# Patient Record
Sex: MALE | Race: WHITE | Employment: OTHER | URBAN - NONMETROPOLITAN AREA
[De-identification: names, ages, dates, MRNs, and addresses within clinical notes are randomized per-mention and may not be internally consistent; named-entity substitution may affect disease eponyms.]

---

## 2018-06-06 ENCOUNTER — HOSPITAL ENCOUNTER (OUTPATIENT)
Dept: ULTRASOUND IMAGING | Age: 63
Discharge: HOME | End: 2018-06-06
Payer: MEDICARE

## 2018-06-06 DIAGNOSIS — N18.4 CHRONIC KIDNEY DISEASE, STAGE IV (SEVERE) (HCC): ICD-10-CM

## 2018-06-06 PROCEDURE — 76770 US EXAM ABDO BACK WALL COMP: CPT

## 2018-06-27 ENCOUNTER — HOSPITAL ENCOUNTER (OUTPATIENT)
Dept: GENERAL RADIOLOGY | Age: 63
Discharge: HOME | End: 2018-06-27
Payer: MEDICARE

## 2018-06-27 ENCOUNTER — HOSPITAL ENCOUNTER (OUTPATIENT)
Dept: PREADMISSION TESTING | Age: 63
Discharge: HOME | End: 2018-07-01
Payer: MEDICARE

## 2018-06-27 ENCOUNTER — HOSPITAL ENCOUNTER (OUTPATIENT)
Dept: VASCULAR LAB | Age: 63
Discharge: HOME | End: 2018-06-27
Payer: MEDICARE

## 2018-06-27 ENCOUNTER — TELEPHONE (OUTPATIENT)
Dept: VASCULAR SURGERY | Age: 63
End: 2018-06-27

## 2018-06-27 ENCOUNTER — OFFICE VISIT (OUTPATIENT)
Dept: VASCULAR SURGERY | Age: 63
End: 2018-06-27
Payer: MEDICARE

## 2018-06-27 VITALS — WEIGHT: 196 LBS | BODY MASS INDEX: 27.44 KG/M2 | HEIGHT: 71 IN

## 2018-06-27 VITALS
HEART RATE: 84 BPM | SYSTOLIC BLOOD PRESSURE: 148 MMHG | DIASTOLIC BLOOD PRESSURE: 70 MMHG | OXYGEN SATURATION: 94 % | RESPIRATION RATE: 18 BRPM

## 2018-06-27 DIAGNOSIS — Z01.818 PRE-OP EXAM: Primary | ICD-10-CM

## 2018-06-27 DIAGNOSIS — N18.4 CHRONIC KIDNEY DISEASE (CKD), STAGE IV (SEVERE) (HCC): Primary | ICD-10-CM

## 2018-06-27 LAB
ANION GAP SERPL CALCULATED.3IONS-SCNC: 17 MMOL/L (ref 7–19)
APTT: 30.8 SEC (ref 26–36.2)
BASOPHILS ABSOLUTE: 0.1 K/UL (ref 0–0.2)
BASOPHILS RELATIVE PERCENT: 0.7 % (ref 0–1)
BUN BLDV-MCNC: 64 MG/DL (ref 8–23)
CALCIUM SERPL-MCNC: 10.2 MG/DL (ref 8.8–10.2)
CHLORIDE BLD-SCNC: 105 MMOL/L (ref 98–111)
CO2: 23 MMOL/L (ref 22–29)
CREAT SERPL-MCNC: 3.8 MG/DL (ref 0.5–1.2)
EOSINOPHILS ABSOLUTE: 0.4 K/UL (ref 0–0.6)
EOSINOPHILS RELATIVE PERCENT: 4.5 % (ref 0–5)
GFR NON-AFRICAN AMERICAN: 16
GLUCOSE BLD-MCNC: 226 MG/DL (ref 74–109)
HCT VFR BLD CALC: 35.9 % (ref 42–52)
HEMOGLOBIN: 11.2 G/DL (ref 14–18)
INR BLD: 1 (ref 0.88–1.18)
LYMPHOCYTES ABSOLUTE: 2.3 K/UL (ref 1.1–4.5)
LYMPHOCYTES RELATIVE PERCENT: 27.7 % (ref 20–40)
MCH RBC QN AUTO: 27.5 PG (ref 27–31)
MCHC RBC AUTO-ENTMCNC: 31.2 G/DL (ref 33–37)
MCV RBC AUTO: 88.2 FL (ref 80–94)
MONOCYTES ABSOLUTE: 0.9 K/UL (ref 0–0.9)
MONOCYTES RELATIVE PERCENT: 11.4 % (ref 0–10)
NEUTROPHILS ABSOLUTE: 4.6 K/UL (ref 1.5–7.5)
NEUTROPHILS RELATIVE PERCENT: 55.5 % (ref 50–65)
PDW BLD-RTO: 14.1 % (ref 11.5–14.5)
PLATELET # BLD: 264 K/UL (ref 130–400)
PMV BLD AUTO: 10.8 FL (ref 9.4–12.4)
POTASSIUM SERPL-SCNC: 5.5 MMOL/L (ref 3.5–5)
PROTHROMBIN TIME: 13.1 SEC (ref 12–14.6)
RBC # BLD: 4.07 M/UL (ref 4.7–6.1)
SODIUM BLD-SCNC: 145 MMOL/L (ref 136–145)
WBC # BLD: 8.3 K/UL (ref 4.8–10.8)

## 2018-06-27 PROCEDURE — 85610 PROTHROMBIN TIME: CPT

## 2018-06-27 PROCEDURE — 87070 CULTURE OTHR SPECIMN AEROBIC: CPT

## 2018-06-27 PROCEDURE — G8427 DOCREV CUR MEDS BY ELIG CLIN: HCPCS | Performed by: PHYSICIAN ASSISTANT

## 2018-06-27 PROCEDURE — 80048 BASIC METABOLIC PNL TOTAL CA: CPT

## 2018-06-27 PROCEDURE — 93005 ELECTROCARDIOGRAM TRACING: CPT

## 2018-06-27 PROCEDURE — 3017F COLORECTAL CA SCREEN DOC REV: CPT | Performed by: PHYSICIAN ASSISTANT

## 2018-06-27 PROCEDURE — 71046 X-RAY EXAM CHEST 2 VIEWS: CPT

## 2018-06-27 PROCEDURE — 85730 THROMBOPLASTIN TIME PARTIAL: CPT

## 2018-06-27 PROCEDURE — 99203 OFFICE O/P NEW LOW 30 MIN: CPT | Performed by: PHYSICIAN ASSISTANT

## 2018-06-27 PROCEDURE — G8419 CALC BMI OUT NRM PARAM NOF/U: HCPCS | Performed by: PHYSICIAN ASSISTANT

## 2018-06-27 PROCEDURE — G0365 VESSEL MAPPING HEMO ACCESS: HCPCS

## 2018-06-27 PROCEDURE — 85025 COMPLETE CBC W/AUTO DIFF WBC: CPT

## 2018-06-27 PROCEDURE — 1036F TOBACCO NON-USER: CPT | Performed by: PHYSICIAN ASSISTANT

## 2018-06-27 RX ORDER — INDAPAMIDE 2.5 MG/1
1 TABLET, FILM COATED ORAL DAILY
COMMUNITY
Start: 2018-05-31 | End: 2021-10-28

## 2018-06-27 RX ORDER — FENOFIBRATE 160 MG/1
160 TABLET ORAL NIGHTLY
Status: ON HOLD | COMMUNITY
Start: 2018-05-06 | End: 2022-09-20 | Stop reason: HOSPADM

## 2018-06-27 RX ORDER — SIMVASTATIN 80 MG
80 TABLET ORAL DAILY
COMMUNITY
Start: 2018-05-07 | End: 2021-10-28

## 2018-06-27 RX ORDER — METOCLOPRAMIDE 5 MG/1
5 TABLET ORAL 4 TIMES DAILY
COMMUNITY
Start: 2018-05-29

## 2018-06-27 RX ORDER — TEMAZEPAM 15 MG/1
15 CAPSULE ORAL NIGHTLY
COMMUNITY
Start: 2018-06-04 | End: 2021-10-28

## 2018-06-27 RX ORDER — SODIUM BICARBONATE 325 MG/1
325 TABLET ORAL 3 TIMES DAILY
COMMUNITY
End: 2018-12-13

## 2018-06-27 RX ORDER — LOSARTAN POTASSIUM 100 MG/1
25 TABLET ORAL DAILY
COMMUNITY
Start: 2018-05-06 | End: 2020-01-23

## 2018-06-27 RX ORDER — INSULIN ASPART 100 [IU]/ML
42 INJECTION, SUSPENSION SUBCUTANEOUS 2 TIMES DAILY
COMMUNITY
Start: 2018-04-02 | End: 2020-01-23

## 2018-06-28 LAB
EKG P AXIS: 34 DEGREES
EKG P-R INTERVAL: 210 MS
EKG Q-T INTERVAL: 420 MS
EKG QRS DURATION: 150 MS
EKG QTC CALCULATION (BAZETT): 434 MS
EKG T AXIS: 47 DEGREES

## 2018-06-29 ENCOUNTER — PREP FOR PROCEDURE (OUTPATIENT)
Dept: VASCULAR SURGERY | Age: 63
End: 2018-06-29

## 2018-06-29 PROBLEM — N18.4 CHRONIC KIDNEY DISEASE (CKD), STAGE IV (SEVERE) (HCC): Status: ACTIVE | Noted: 2018-06-29

## 2018-06-29 LAB — MRSA CULTURE ONLY: NORMAL

## 2018-06-29 RX ORDER — SODIUM CHLORIDE 0.9 % (FLUSH) 0.9 %
10 SYRINGE (ML) INJECTION PRN
Status: CANCELLED | OUTPATIENT
Start: 2018-06-29 | End: 2019-06-29

## 2018-06-29 RX ORDER — ASPIRIN 81 MG/1
81 TABLET ORAL ONCE
Status: CANCELLED | OUTPATIENT
Start: 2018-06-29 | End: 2018-06-29

## 2018-06-29 RX ORDER — SODIUM CHLORIDE 0.9 % (FLUSH) 0.9 %
10 SYRINGE (ML) INJECTION EVERY 12 HOURS SCHEDULED
Status: CANCELLED | OUTPATIENT
Start: 2018-06-29 | End: 2019-06-29

## 2018-07-02 ENCOUNTER — ANESTHESIA (OUTPATIENT)
Dept: OPERATING ROOM | Age: 63
End: 2018-07-02
Payer: MEDICARE

## 2018-07-02 ENCOUNTER — HOSPITAL ENCOUNTER (OUTPATIENT)
Age: 63
Setting detail: OUTPATIENT SURGERY
Discharge: HOME | End: 2018-07-02
Attending: SURGERY | Admitting: SURGERY
Payer: MEDICARE

## 2018-07-02 ENCOUNTER — ANESTHESIA EVENT (OUTPATIENT)
Dept: OPERATING ROOM | Age: 63
End: 2018-07-02
Payer: MEDICARE

## 2018-07-02 VITALS — DIASTOLIC BLOOD PRESSURE: 70 MMHG | SYSTOLIC BLOOD PRESSURE: 110 MMHG | OXYGEN SATURATION: 99 %

## 2018-07-02 VITALS
BODY MASS INDEX: 27.44 KG/M2 | HEIGHT: 71 IN | SYSTOLIC BLOOD PRESSURE: 129 MMHG | HEART RATE: 58 BPM | OXYGEN SATURATION: 97 % | TEMPERATURE: 97.2 F | WEIGHT: 196 LBS | DIASTOLIC BLOOD PRESSURE: 65 MMHG | RESPIRATION RATE: 16 BRPM

## 2018-07-02 LAB
BASE EXCESS VENOUS: 1
CALCIUM IONIZED: 1.27 MMOL/L (ref 1.1–1.3)
CO2: 28 MEQ/L (ref 21–32)
GFR NON-AFRICAN AMERICAN: 15
GLUCOSE BLD-MCNC: 137 MG/DL (ref 70–99)
GLUCOSE BLD-MCNC: 141 MG/DL (ref 70–99)
HEMOGLOBIN: 11.8 GM/DL (ref 12–18)
O2 SAT, VEN: 48 %
PCO2, VEN: 50.5 MM HG (ref 40–50)
PERFORMED ON: ABNORMAL
PERFORMED ON: ABNORMAL
PH VENOUS: 7.35
PO2, VEN: 27.8 MM HG
POC ANION GAP: 10
POC CHLORIDE: 107 MEQ/L (ref 99–110)
POC CREATININE: 4 MG/DL (ref 0.3–1.3)
POC HEMATOCRIT: 35 % (ref 37–52)
POC POTASSIUM: 4.7 MEQ/L (ref 3.5–5.1)
POC SAMPLE TYPE: ABNORMAL
POC SODIUM: 145 MEQ/L (ref 136–145)
TCO2 CALC VENOUS: 29 MMOL/L

## 2018-07-02 PROCEDURE — 82374 ASSAY BLOOD CARBON DIOXIDE: CPT

## 2018-07-02 PROCEDURE — 6370000000 HC RX 637 (ALT 250 FOR IP): Performed by: PHYSICIAN ASSISTANT

## 2018-07-02 PROCEDURE — 2580000003 HC RX 258: Performed by: NURSE ANESTHETIST, CERTIFIED REGISTERED

## 2018-07-02 PROCEDURE — 82435 ASSAY OF BLOOD CHLORIDE: CPT

## 2018-07-02 PROCEDURE — 2500000003 HC RX 250 WO HCPCS: Performed by: NURSE ANESTHETIST, CERTIFIED REGISTERED

## 2018-07-02 PROCEDURE — 82810 BLOOD GASES O2 SAT ONLY: CPT

## 2018-07-02 PROCEDURE — 82948 REAGENT STRIP/BLOOD GLUCOSE: CPT

## 2018-07-02 PROCEDURE — 84295 ASSAY OF SERUM SODIUM: CPT

## 2018-07-02 PROCEDURE — 6360000002 HC RX W HCPCS: Performed by: SURGERY

## 2018-07-02 PROCEDURE — 3600000004 HC SURGERY LEVEL 4 BASE: Performed by: SURGERY

## 2018-07-02 PROCEDURE — 84132 ASSAY OF SERUM POTASSIUM: CPT

## 2018-07-02 PROCEDURE — 6360000002 HC RX W HCPCS: Performed by: ANESTHESIOLOGY

## 2018-07-02 PROCEDURE — 82800 BLOOD PH: CPT

## 2018-07-02 PROCEDURE — 2580000003 HC RX 258: Performed by: SURGERY

## 2018-07-02 PROCEDURE — 7100000000 HC PACU RECOVERY - FIRST 15 MIN: Performed by: SURGERY

## 2018-07-02 PROCEDURE — 82565 ASSAY OF CREATININE: CPT

## 2018-07-02 PROCEDURE — 7100000001 HC PACU RECOVERY - ADDTL 15 MIN: Performed by: SURGERY

## 2018-07-02 PROCEDURE — 3700000001 HC ADD 15 MINUTES (ANESTHESIA): Performed by: SURGERY

## 2018-07-02 PROCEDURE — 3600000014 HC SURGERY LEVEL 4 ADDTL 15MIN: Performed by: SURGERY

## 2018-07-02 PROCEDURE — 7100000011 HC PHASE II RECOVERY - ADDTL 15 MIN: Performed by: SURGERY

## 2018-07-02 PROCEDURE — 7100000010 HC PHASE II RECOVERY - FIRST 15 MIN: Performed by: SURGERY

## 2018-07-02 PROCEDURE — 85014 HEMATOCRIT: CPT

## 2018-07-02 PROCEDURE — 6360000002 HC RX W HCPCS

## 2018-07-02 PROCEDURE — 82330 ASSAY OF CALCIUM: CPT

## 2018-07-02 PROCEDURE — 36821 AV FUSION DIRECT ANY SITE: CPT | Performed by: SURGERY

## 2018-07-02 PROCEDURE — 6360000002 HC RX W HCPCS: Performed by: NURSE ANESTHETIST, CERTIFIED REGISTERED

## 2018-07-02 PROCEDURE — 3700000000 HC ANESTHESIA ATTENDED CARE: Performed by: SURGERY

## 2018-07-02 PROCEDURE — 2780000010 HC IMPLANT OTHER: Performed by: SURGERY

## 2018-07-02 RX ORDER — DIPHENHYDRAMINE HYDROCHLORIDE 50 MG/ML
12.5 INJECTION INTRAMUSCULAR; INTRAVENOUS
Status: DISCONTINUED | OUTPATIENT
Start: 2018-07-02 | End: 2018-07-02 | Stop reason: HOSPADM

## 2018-07-02 RX ORDER — MIDAZOLAM HYDROCHLORIDE 1 MG/ML
INJECTION INTRAMUSCULAR; INTRAVENOUS
Status: COMPLETED
Start: 2018-07-02 | End: 2018-07-02

## 2018-07-02 RX ORDER — SODIUM CHLORIDE 0.9 % (FLUSH) 0.9 %
10 SYRINGE (ML) INJECTION PRN
Status: DISCONTINUED | OUTPATIENT
Start: 2018-07-02 | End: 2018-07-02 | Stop reason: HOSPADM

## 2018-07-02 RX ORDER — ONDANSETRON 2 MG/ML
INJECTION INTRAMUSCULAR; INTRAVENOUS PRN
Status: DISCONTINUED | OUTPATIENT
Start: 2018-07-02 | End: 2018-07-02 | Stop reason: SDUPTHER

## 2018-07-02 RX ORDER — MEPERIDINE HYDROCHLORIDE 50 MG/ML
12.5 INJECTION INTRAMUSCULAR; INTRAVENOUS; SUBCUTANEOUS EVERY 5 MIN PRN
Status: DISCONTINUED | OUTPATIENT
Start: 2018-07-02 | End: 2018-07-02 | Stop reason: HOSPADM

## 2018-07-02 RX ORDER — SODIUM CHLORIDE, SODIUM LACTATE, POTASSIUM CHLORIDE, CALCIUM CHLORIDE 600; 310; 30; 20 MG/100ML; MG/100ML; MG/100ML; MG/100ML
INJECTION, SOLUTION INTRAVENOUS CONTINUOUS PRN
Status: DISCONTINUED | OUTPATIENT
Start: 2018-07-02 | End: 2018-07-02 | Stop reason: SDUPTHER

## 2018-07-02 RX ORDER — LIDOCAINE HYDROCHLORIDE 10 MG/ML
INJECTION, SOLUTION INFILTRATION; PERINEURAL PRN
Status: DISCONTINUED | OUTPATIENT
Start: 2018-07-02 | End: 2018-07-02 | Stop reason: SDUPTHER

## 2018-07-02 RX ORDER — ASPIRIN 81 MG/1
81 TABLET ORAL ONCE
Status: COMPLETED | OUTPATIENT
Start: 2018-07-02 | End: 2018-07-02

## 2018-07-02 RX ORDER — ACETAMINOPHEN 325 MG/1
650 TABLET ORAL EVERY 4 HOURS PRN
Status: DISCONTINUED | OUTPATIENT
Start: 2018-07-02 | End: 2018-07-02 | Stop reason: HOSPADM

## 2018-07-02 RX ORDER — SODIUM CHLORIDE 450 MG/100ML
INJECTION, SOLUTION INTRAVENOUS CONTINUOUS PRN
Status: DISCONTINUED | OUTPATIENT
Start: 2018-07-02 | End: 2018-07-02 | Stop reason: SDUPTHER

## 2018-07-02 RX ORDER — FENTANYL CITRATE 50 UG/ML
50 INJECTION, SOLUTION INTRAMUSCULAR; INTRAVENOUS
Status: DISCONTINUED | OUTPATIENT
Start: 2018-07-02 | End: 2018-07-02 | Stop reason: HOSPADM

## 2018-07-02 RX ORDER — PROPOFOL 10 MG/ML
INJECTION, EMULSION INTRAVENOUS PRN
Status: DISCONTINUED | OUTPATIENT
Start: 2018-07-02 | End: 2018-07-02 | Stop reason: SDUPTHER

## 2018-07-02 RX ORDER — MORPHINE SULFATE 1 MG/ML
4 INJECTION, SOLUTION EPIDURAL; INTRATHECAL; INTRAVENOUS EVERY 5 MIN PRN
Status: DISCONTINUED | OUTPATIENT
Start: 2018-07-02 | End: 2018-07-02 | Stop reason: HOSPADM

## 2018-07-02 RX ORDER — HYDROMORPHONE HCL IN 0.9% NACL 0.5 MG/ML
0.5 SYRINGE (ML) INTRAVENOUS EVERY 5 MIN PRN
Status: DISCONTINUED | OUTPATIENT
Start: 2018-07-02 | End: 2018-07-02 | Stop reason: HOSPADM

## 2018-07-02 RX ORDER — CEFAZOLIN SODIUM 1 G/50ML
1 INJECTION, SOLUTION INTRAVENOUS ONCE
Status: COMPLETED | OUTPATIENT
Start: 2018-07-02 | End: 2018-07-02

## 2018-07-02 RX ORDER — MIDAZOLAM HYDROCHLORIDE 1 MG/ML
2 INJECTION INTRAMUSCULAR; INTRAVENOUS
Status: DISCONTINUED | OUTPATIENT
Start: 2018-07-02 | End: 2018-07-02 | Stop reason: HOSPADM

## 2018-07-02 RX ORDER — HYDROCODONE BITARTRATE AND ACETAMINOPHEN 5; 325 MG/1; MG/1
1 TABLET ORAL EVERY 4 HOURS PRN
Status: DISCONTINUED | OUTPATIENT
Start: 2018-07-02 | End: 2018-07-02 | Stop reason: HOSPADM

## 2018-07-02 RX ORDER — METOCLOPRAMIDE HYDROCHLORIDE 5 MG/ML
10 INJECTION INTRAMUSCULAR; INTRAVENOUS
Status: DISCONTINUED | OUTPATIENT
Start: 2018-07-02 | End: 2018-07-02 | Stop reason: HOSPADM

## 2018-07-02 RX ORDER — SCOLOPAMINE TRANSDERMAL SYSTEM 1 MG/1
1 PATCH, EXTENDED RELEASE TRANSDERMAL ONCE
Status: DISCONTINUED | OUTPATIENT
Start: 2018-07-02 | End: 2018-07-02 | Stop reason: HOSPADM

## 2018-07-02 RX ORDER — HYDRALAZINE HYDROCHLORIDE 20 MG/ML
5 INJECTION INTRAMUSCULAR; INTRAVENOUS EVERY 10 MIN PRN
Status: DISCONTINUED | OUTPATIENT
Start: 2018-07-02 | End: 2018-07-02 | Stop reason: HOSPADM

## 2018-07-02 RX ORDER — LIDOCAINE HYDROCHLORIDE 10 MG/ML
1 INJECTION, SOLUTION EPIDURAL; INFILTRATION; INTRACAUDAL; PERINEURAL
Status: DISCONTINUED | OUTPATIENT
Start: 2018-07-02 | End: 2018-07-02 | Stop reason: HOSPADM

## 2018-07-02 RX ORDER — PROPOFOL 10 MG/ML
INJECTION, EMULSION INTRAVENOUS CONTINUOUS PRN
Status: DISCONTINUED | OUTPATIENT
Start: 2018-07-02 | End: 2018-07-02 | Stop reason: SDUPTHER

## 2018-07-02 RX ORDER — MORPHINE SULFATE 4 MG/ML
4 INJECTION, SOLUTION INTRAMUSCULAR; INTRAVENOUS
Status: DISCONTINUED | OUTPATIENT
Start: 2018-07-02 | End: 2018-07-02 | Stop reason: HOSPADM

## 2018-07-02 RX ORDER — HYDROMORPHONE HCL IN 0.9% NACL 0.5 MG/ML
0.25 SYRINGE (ML) INTRAVENOUS EVERY 5 MIN PRN
Status: DISCONTINUED | OUTPATIENT
Start: 2018-07-02 | End: 2018-07-02 | Stop reason: HOSPADM

## 2018-07-02 RX ORDER — SODIUM CHLORIDE 0.9 % (FLUSH) 0.9 %
10 SYRINGE (ML) INJECTION EVERY 12 HOURS SCHEDULED
Status: DISCONTINUED | OUTPATIENT
Start: 2018-07-02 | End: 2018-07-02 | Stop reason: HOSPADM

## 2018-07-02 RX ORDER — HYDROCODONE BITARTRATE AND ACETAMINOPHEN 5; 325 MG/1; MG/1
2 TABLET ORAL EVERY 4 HOURS PRN
Status: DISCONTINUED | OUTPATIENT
Start: 2018-07-02 | End: 2018-07-02 | Stop reason: HOSPADM

## 2018-07-02 RX ORDER — PROMETHAZINE HYDROCHLORIDE 25 MG/ML
6.25 INJECTION, SOLUTION INTRAMUSCULAR; INTRAVENOUS
Status: DISCONTINUED | OUTPATIENT
Start: 2018-07-02 | End: 2018-07-02 | Stop reason: HOSPADM

## 2018-07-02 RX ORDER — LABETALOL HYDROCHLORIDE 5 MG/ML
5 INJECTION, SOLUTION INTRAVENOUS EVERY 10 MIN PRN
Status: DISCONTINUED | OUTPATIENT
Start: 2018-07-02 | End: 2018-07-02 | Stop reason: HOSPADM

## 2018-07-02 RX ORDER — MORPHINE SULFATE 1 MG/ML
2 INJECTION, SOLUTION EPIDURAL; INTRATHECAL; INTRAVENOUS EVERY 5 MIN PRN
Status: DISCONTINUED | OUTPATIENT
Start: 2018-07-02 | End: 2018-07-02 | Stop reason: HOSPADM

## 2018-07-02 RX ORDER — SODIUM CHLORIDE, SODIUM LACTATE, POTASSIUM CHLORIDE, CALCIUM CHLORIDE 600; 310; 30; 20 MG/100ML; MG/100ML; MG/100ML; MG/100ML
INJECTION, SOLUTION INTRAVENOUS CONTINUOUS
Status: DISCONTINUED | OUTPATIENT
Start: 2018-07-02 | End: 2018-07-02 | Stop reason: HOSPADM

## 2018-07-02 RX ADMIN — SODIUM CHLORIDE: 4.5 INJECTION, SOLUTION INTRAVENOUS at 08:33

## 2018-07-02 RX ADMIN — ONDANSETRON HYDROCHLORIDE 4 MG: 2 SOLUTION INTRAMUSCULAR; INTRAVENOUS at 09:42

## 2018-07-02 RX ADMIN — PROPOFOL 50 MG: 10 INJECTION, EMULSION INTRAVENOUS at 08:38

## 2018-07-02 RX ADMIN — PROPOFOL 75 MCG/KG/MIN: 10 INJECTION, EMULSION INTRAVENOUS at 08:42

## 2018-07-02 RX ADMIN — MIDAZOLAM 2 MG: 1 INJECTION INTRAMUSCULAR; INTRAVENOUS at 08:26

## 2018-07-02 RX ADMIN — CEFAZOLIN SODIUM 1 G: 1 INJECTION, SOLUTION INTRAVENOUS at 08:46

## 2018-07-02 RX ADMIN — LIDOCAINE HYDROCHLORIDE 30 MG: 10 INJECTION, SOLUTION INFILTRATION; PERINEURAL at 08:38

## 2018-07-02 RX ADMIN — ASPIRIN 81 MG: 81 TABLET, COATED ORAL at 07:15

## 2018-07-02 ASSESSMENT — PAIN - FUNCTIONAL ASSESSMENT: PAIN_FUNCTIONAL_ASSESSMENT: 0-10

## 2018-07-02 ASSESSMENT — LIFESTYLE VARIABLES: SMOKING_STATUS: 0

## 2018-07-02 ASSESSMENT — PAIN SCALES - GENERAL: PAINLEVEL_OUTOF10: 0

## 2018-07-02 ASSESSMENT — ENCOUNTER SYMPTOMS: SHORTNESS OF BREATH: 0

## 2018-07-02 NOTE — ADDENDUM NOTE
Addendum  created 07/02/18 1105 by CALIXTO Trevizo - CRNA    Anesthesia Intra Meds edited, Orders acknowledged in Narrator

## 2018-07-02 NOTE — OP NOTE
a  good thrill palpable. One hemostatic suture was required in the  anastomosis. Wound was irrigated and then closed in layers with 3-0 PDS  and 4-0 Monocryl. The patient to recovery room in stable condition.         Mariana Reyna MD    D: 07/02/2018 10:50:16      T: 07/02/2018 11:40:43     [email or web address]  Job#: [id number]     Doc#: [id number]    CC:  MD Du Puentes MD  [facility] Nephrologist

## 2018-07-02 NOTE — ANESTHESIA PRE PROCEDURE
Department of Anesthesiology  Preprocedure Note       Name:  Latonya Rangel   Age:  58 y.o.  :  1955                                          MRN:  [id number]         Date:  2018      Surgeon: Dennis Jones):  Mega Davis MD    Procedure: Procedure(s):  ARM RADICAL/ CEPHALIC AV FISTULA    Medications prior to admission:   Prior to Admission medications    Medication Sig Start Date End Date Taking? Authorizing Provider   fenofibrate 160 MG tablet Take 160 mg by mouth daily 18  Yes Historical Provider, MD   indapamide (LOZOL) 2.5 MG tablet Take 1 tablet by mouth daily 18  Yes Historical Provider, MD   NOVOLOG MIX 70/30 FLEXPEN (70-30) 100 UNIT/ML injection Inject 35 Units as directed 2 times daily 18  Yes Historical Provider, MD   losartan (COZAAR) 100 MG tablet Take 100 mg by mouth daily 18  Yes Historical Provider, MD   metoclopramide (REGLAN) 5 MG tablet Take 5 mg by mouth 4 times daily 18  Yes Historical Provider, MD   simvastatin (ZOCOR) 80 MG tablet Take 80 mg by mouth daily 18  Yes Historical Provider, MD   temazepam (RESTORIL) 15 MG capsule Take 15 mg by mouth nightly. . 18  Yes Historical Provider, MD   aspirin 81 MG tablet Take 81 mg by mouth daily   Yes Historical Provider, MD   sodium bicarbonate 325 MG tablet Take 325 mg by mouth 3 times daily   Yes Historical Provider, MD       Current medications:    Current Facility-Administered Medications   Medication Dose Route Frequency Provider Last Rate Last Dose    sodium chloride flush 0.9 % injection 10 mL  10 mL Intravenous 2 times per day Naun Gomez PA-C        sodium chloride flush 0.9 % injection 10 mL  10 mL Intravenous PRN Chelsie Gomez PA-C        aspirin EC tablet 81 mg  [facility]        ceFAZolin (ANCEF) 1 g in dextrose 5 % 50 mL IVPB (premix)  1 g Intravenous Once Mega Davis MD           Allergies:  No Known Allergies    Problem List:    Patient Active Problem List Diagnosis Code    Chronic kidney disease (CKD), stage IV (severe) ([facility] N18.4       Past Medical History:        Diagnosis Date    Chronic kidney disease     Diabetes mellitus ([facility]     Digestive disorder     \" slow digestion \"    Hyperlipemia     Hypertension     Legally blind     Neuropathy ([facility]     feet       Past Surgical History:        Procedure Laterality Date    CATARACT REMOVAL      EYE SURGERY         Social History:    Social History   Substance Use Topics    Smoking status: Never Smoker    Smokeless tobacco: Never Used    Alcohol use No                                Counseling given: Not Answered      Vital Signs (Current):   Vitals:    07/02/18 0651   BP: (!) 161/82   Pulse: 66   Resp: 20   Temp: 97.5 °F (36.4 °C)   TempSrc: Temporal   Weight: 196 lb (88.9 kg)   Height: 5' 11\" (1.803 m)                                              BP Readings from Last 3 Encounters:   07/02/18 (!) 161/82   06/27/18 (!) 148/70       NPO Status: Time of last liquid consumption: 1600                        Time of last solid consumption: 1600                        Date of last liquid consumption: 07/01/18                        Date of last solid food consumption: 07/01/18    BMI:   Wt Readings from Last 3 Encounters:   07/02/18 196 lb (88.9 kg)   06/27/18 196 lb (88.9 kg)     Body mass index is 27.34 kg/m².     CBC:   Lab Results   Component Value Date    WBC 8.3 06/27/2018    RBC 4.07 06/27/2018    HGB 11.2 06/27/2018    HCT 35.9 06/27/2018    MCV 88.2 06/27/2018    RDW 14.1 06/27/2018     06/27/2018       CMP:   Lab Results   Component Value Date     06/27/2018    K 5.5 06/27/2018     06/27/2018    CO2 23 06/27/2018    BUN 64 06/27/2018    CREATININE 3.8 06/27/2018    LABGLOM 16 06/27/2018    GLUCOSE 226 06/27/2018    CALCIUM 10.2 06/27/2018       POC Tests:   Recent Labs      07/02/18   0700   POCGLU  137*       Coags:   Lab Results   Component Value Date    PROTIME 13.1 06/27/2018

## 2018-07-02 NOTE — H&P (VIEW-ONLY)
Patient Care Team:  Bryon Rosa as PCP - Ramiro Quiroz MD as Consulting Physician (Nephrology)      History and Physical    Mr. Meaghan Santillan is a 57 yo male who has a history of chronic kidney disease for a duration of 1 - 5 years. He has a history of DM and HTN. This is believed to be caused by diabetic nephropathy. He has experienced fatigue. He is now stage IV and disease process has progressed to the point of needing hemodiaylsis access. He is right handed. He has had no previous access. Lab Results   Component Value Date    CREATININE 3.8 (H) 06/27/2018    BUN 64 (H) 06/27/2018     06/27/2018    K 5.5 (H) 06/27/2018     06/27/2018    CO2 23 06/27/2018       Buck Mcmullen is a 58 y.o. male with the following history reviewed and recorded in [facility]:  Patient Active Problem List    Diagnosis Date Noted    Chronic kidney disease (CKD), stage IV (severe) ([facility]) 06/29/2018     Current Outpatient Prescriptions   Medication Sig Dispense Refill    fenofibrate 160 MG tablet Take 160 mg by mouth daily      indapamide (LOZOL) 2.5 MG tablet Take 1 tablet by mouth daily      NOVOLOG MIX 70/30 FLEXPEN (70-30) 100 UNIT/ML injection Inject 35 Units as directed 2 times daily      losartan (COZAAR) 100 MG tablet Take 100 mg by mouth daily      metoclopramide (REGLAN) 5 MG tablet Take 5 mg by mouth 4 times daily      simvastatin (ZOCOR) 80 MG tablet Take 80 mg by mouth daily      temazepam (RESTORIL) 15 MG capsule Take 15 mg by mouth nightly. [facility] aspirin 81 MG tablet Take 81 mg by mouth daily      sodium bicarbonate 325 MG tablet Take 325 mg by mouth 3 times daily       No current facility-administered medications for this visit. Allergies: Patient has no known allergies.   Past Medical History:   Diagnosis Date    Chronic kidney disease     Diabetes mellitus ([address]     Digestive disorder     \" slow digestion \"    Hyperlipemia     Hypertension     Legally blind     Neuropathy ([address]     feet     Past Surgical History:   Procedure Laterality Date    CATARACT REMOVAL      EYE SURGERY       Family History   Problem Relation Age of Onset   Kinjal Robin Cancer Mother     Other Mother         COPD    Other Father         tractor accident     Social History   Substance Use Topics    Smoking status: Never Smoker    Smokeless tobacco: Never Used    Alcohol use No       Old records have been obtained from the referring provider. These records have been reviewed and summarized. Review of Systems    Constitutional  no significant activity change, appetite change, or unexpected weight change. No fever or chills. No diaphoresis or significant fatigue. HENT  no significant rhinorrhea or epistaxis. No tinnitus or significant hearing loss. Eyes  no sudden vision change or amaurosis. Respiratory  no significant shortness of breath, wheezing, or stridor. No apnea, cough, or chest tightness associated with shortness of breath. Cardiovascular  no chest pain, syncope, or significant dizziness. No palpitations or significant leg swelling. No claudication. Gastrointestinal  no abdominal swelling or pain. No blood in stool. No severe constipation, diarrhea, nausea, or vomiting. Genitourinary  No dysuria, frequency, or urgency. No flank pain or hematuria. Musculoskeletal  no back pain, gait disturbance, or myalgia. Skin  no color change, rash, pallor, or new wound. Neurologic  no dizziness, facial asymmetry, or light headedness. No seizures. No speech difficulty or lateralizing weakness. Hematologic  no easy bruising or excessive bleeding. Psychiatric  no severe anxiety or nervousness. No confusion. All other review of systems are negative. Physical Exam    BP (!) 148/70 (Site: Left Arm, Position: Sitting, Cuff Size: Medium Adult)   Pulse 84   Resp 18   SpO2 94%     Constitutional  well developed, well nourished. No diaphoresis or acute distress.   HENT  head

## 2018-07-02 NOTE — BRIEF OP NOTE
Brief Postoperative Note  ______________________________________________________________    Patient: Socrates Díaz  YOB: 1955  MRN: [id number]  Date of Procedure: 7/2/2018    Pre-Op Diagnosis: CKD IV    Post-Op Diagnosis: Same       Procedure(s):  ARM RADICAL/ CEPHALIC AV FISTULA    Anesthesia: Regional, General    Surgeon(s):  Jenelle Cruz MD    Staff:  [employer]: Omid Christy; Jun Chaudhary     Estimated Blood Loss: * No values recorded between 7/2/2018  8:33 AM and 7/2/2018  9:43 AM * None    Complications: None    Specimens:   * No specimens in log *    Implants:  * No implants in log *      Drains:      Findings: see op not    Abimbola Watkins MD  Date: 7/2/2018  Time: 9:43 AM

## 2018-07-18 ENCOUNTER — OFFICE VISIT (OUTPATIENT)
Dept: VASCULAR SURGERY | Age: 63
End: 2018-07-18

## 2018-07-18 VITALS
OXYGEN SATURATION: 94 % | DIASTOLIC BLOOD PRESSURE: 74 MMHG | HEART RATE: 83 BPM | SYSTOLIC BLOOD PRESSURE: 138 MMHG | TEMPERATURE: 97.8 F | RESPIRATION RATE: 18 BRPM

## 2018-07-18 DIAGNOSIS — N18.4 CHRONIC KIDNEY DISEASE (CKD), STAGE IV (SEVERE) (HCC): Primary | ICD-10-CM

## 2018-07-18 PROCEDURE — 99024 POSTOP FOLLOW-UP VISIT: CPT | Performed by: NURSE PRACTITIONER

## 2018-11-05 ENCOUNTER — TELEPHONE (OUTPATIENT)
Dept: VASCULAR SURGERY | Age: 63
End: 2018-11-05

## 2018-12-12 ENCOUNTER — PREP FOR PROCEDURE (OUTPATIENT)
Dept: VASCULAR SURGERY | Age: 63
End: 2018-12-12

## 2018-12-12 RX ORDER — ASPIRIN 81 MG/1
81 TABLET ORAL ONCE
Status: CANCELLED | OUTPATIENT
Start: 2018-12-12 | End: 2018-12-12

## 2018-12-12 RX ORDER — SODIUM CHLORIDE 0.9 % (FLUSH) 0.9 %
10 SYRINGE (ML) INJECTION PRN
Status: CANCELLED | OUTPATIENT
Start: 2018-12-12

## 2018-12-12 RX ORDER — CLONIDINE HYDROCHLORIDE 0.1 MG/1
0.1 TABLET ORAL PRN
Status: CANCELLED | OUTPATIENT
Start: 2018-12-12

## 2018-12-12 RX ORDER — SODIUM CHLORIDE 9 MG/ML
INJECTION, SOLUTION INTRAVENOUS CONTINUOUS
Status: CANCELLED | OUTPATIENT
Start: 2018-12-12

## 2018-12-12 NOTE — H&P
Patient Care Team:  Ab Quach as PCP - Gonzalo Najera MD as Consulting Physician (Nephrology)        Latonya Calderon 61 y.o. male with a history of renal failure requiring hemodialysis access. Patient is currently having arterial spasms and difficulty cannulating    Patient Active Problem List   Diagnosis    Chronic kidney disease (CKD), stage IV (severe) ([facility]      See attached meds  Allergies:  Patient has no known allergies. Past Medical History:   Diagnosis Date    Chronic kidney disease     Diabetes mellitus ([facility]     Digestive disorder     \" slow digestion \"    Hyperlipemia     Hypertension     Legally blind     Neuropathy     feet      Past Surgical History:   Procedure Laterality Date    CATARACT REMOVAL      EYE SURGERY      [place] ANASTOMOSIS,AV,ANY SITE Left 7/2/2018    ARM RADICAL/ CEPHALIC AV FISTULA performed by Lex Ascencio MD at [address] History   Problem Relation Age of Onset    Cancer Mother     Other Mother         COPD    Other Father         tractor accident      Social History   Substance Use Topics    Smoking status: Never Smoker    Smokeless tobacco: Never Used    Alcohol use No     PE:  NAD  CTA B  RRR  Weak left forearm av fistula    A/P:    Poorly functioning weak left forearm av fistula    Permit for fistulogram with possible angioplasty or stent    Risks have been reviewed with the patient including but not limited to heart attack, death, CVA, bleeding, nerve injury, infection, steal, pain, and need for further surgery.       _______________________________________________________________________  Signature     Date    Time

## 2018-12-13 ENCOUNTER — HOSPITAL ENCOUNTER (OUTPATIENT)
Dept: INTERVENTIONAL RADIOLOGY/VASCULAR | Age: 63
Discharge: HOME | End: 2018-12-13
Payer: MEDICARE

## 2018-12-13 VITALS
TEMPERATURE: 97.3 F | SYSTOLIC BLOOD PRESSURE: 132 MMHG | HEART RATE: 72 BPM | DIASTOLIC BLOOD PRESSURE: 75 MMHG | OXYGEN SATURATION: 97 % | RESPIRATION RATE: 17 BRPM | WEIGHT: 187 LBS | BODY MASS INDEX: 26.18 KG/M2 | HEIGHT: 71 IN

## 2018-12-13 DIAGNOSIS — T82.590A DIALYSIS AV FISTULA MALFUNCTION, INITIAL ENCOUNTER (HCC): ICD-10-CM

## 2018-12-13 DIAGNOSIS — N18.4 CHRONIC KIDNEY DISEASE (CKD), STAGE IV (SEVERE) (HCC): ICD-10-CM

## 2018-12-13 PROCEDURE — 99152 MOD SED SAME PHYS/QHP 5/>YRS: CPT | Performed by: SURGERY

## 2018-12-13 PROCEDURE — 6360000004 HC RX CONTRAST MEDICATION: Performed by: SURGERY

## 2018-12-13 PROCEDURE — 2500000003 HC RX 250 WO HCPCS: Performed by: SURGERY

## 2018-12-13 PROCEDURE — 2709999900 IR GUIDED INTRO CATH DIALYSIS CIRCUIT

## 2018-12-13 PROCEDURE — 6370000000 HC RX 637 (ALT 250 FOR IP): Performed by: NURSE PRACTITIONER

## 2018-12-13 PROCEDURE — 36902 INTRO CATH DIALYSIS CIRCUIT: CPT | Performed by: SURGERY

## 2018-12-13 PROCEDURE — 36909 DIALYSIS CIRCUIT EMBOLJ: CPT | Performed by: SURGERY

## 2018-12-13 PROCEDURE — 6360000002 HC RX W HCPCS: Performed by: SURGERY

## 2018-12-13 PROCEDURE — 2580000003 HC RX 258: Performed by: NURSE PRACTITIONER

## 2018-12-13 PROCEDURE — 99153 MOD SED SAME PHYS/QHP EA: CPT | Performed by: SURGERY

## 2018-12-13 RX ORDER — SODIUM CHLORIDE 0.9 % (FLUSH) 0.9 %
10 SYRINGE (ML) INJECTION PRN
Status: DISCONTINUED | OUTPATIENT
Start: 2018-12-13 | End: 2018-12-15 | Stop reason: HOSPADM

## 2018-12-13 RX ORDER — HYDROCODONE BITARTRATE AND ACETAMINOPHEN 5; 325 MG/1; MG/1
1 TABLET ORAL EVERY 4 HOURS PRN
Status: DISCONTINUED | OUTPATIENT
Start: 2018-12-13 | End: 2018-12-15 | Stop reason: HOSPADM

## 2018-12-13 RX ORDER — HEPARIN SODIUM 5000 [USP'U]/ML
INJECTION, SOLUTION INTRAVENOUS; SUBCUTANEOUS
Status: COMPLETED | OUTPATIENT
Start: 2018-12-13 | End: 2018-12-13

## 2018-12-13 RX ORDER — ASPIRIN 81 MG/1
81 TABLET ORAL ONCE
Status: COMPLETED | OUTPATIENT
Start: 2018-12-13 | End: 2018-12-13

## 2018-12-13 RX ORDER — MIDAZOLAM HYDROCHLORIDE 1 MG/ML
INJECTION INTRAMUSCULAR; INTRAVENOUS
Status: COMPLETED | OUTPATIENT
Start: 2018-12-13 | End: 2018-12-13

## 2018-12-13 RX ORDER — SODIUM CHLORIDE 9 MG/ML
INJECTION, SOLUTION INTRAVENOUS CONTINUOUS
Status: DISCONTINUED | OUTPATIENT
Start: 2018-12-13 | End: 2018-12-15 | Stop reason: HOSPADM

## 2018-12-13 RX ORDER — ONDANSETRON 2 MG/ML
4 INJECTION INTRAMUSCULAR; INTRAVENOUS EVERY 8 HOURS PRN
Status: DISCONTINUED | OUTPATIENT
Start: 2018-12-13 | End: 2018-12-15 | Stop reason: HOSPADM

## 2018-12-13 RX ORDER — FENTANYL CITRATE 50 UG/ML
INJECTION, SOLUTION INTRAMUSCULAR; INTRAVENOUS
Status: COMPLETED | OUTPATIENT
Start: 2018-12-13 | End: 2018-12-13

## 2018-12-13 RX ORDER — HYDROCODONE BITARTRATE AND ACETAMINOPHEN 5; 325 MG/1; MG/1
2 TABLET ORAL EVERY 4 HOURS PRN
Status: DISCONTINUED | OUTPATIENT
Start: 2018-12-13 | End: 2018-12-15 | Stop reason: HOSPADM

## 2018-12-13 RX ORDER — LIDOCAINE HYDROCHLORIDE 20 MG/ML
INJECTION, SOLUTION INFILTRATION; PERINEURAL
Status: COMPLETED | OUTPATIENT
Start: 2018-12-13 | End: 2018-12-13

## 2018-12-13 RX ORDER — ACETAMINOPHEN 325 MG/1
650 TABLET ORAL EVERY 4 HOURS PRN
Status: DISCONTINUED | OUTPATIENT
Start: 2018-12-13 | End: 2018-12-15 | Stop reason: HOSPADM

## 2018-12-13 RX ORDER — CLONIDINE HYDROCHLORIDE 0.1 MG/1
0.1 TABLET ORAL PRN
Status: DISCONTINUED | OUTPATIENT
Start: 2018-12-13 | End: 2018-12-15 | Stop reason: HOSPADM

## 2018-12-13 RX ADMIN — IOPAMIDOL 40 ML: 612 INJECTION, SOLUTION INTRATHECAL at 10:40

## 2018-12-13 RX ADMIN — SODIUM CHLORIDE: 9 INJECTION, SOLUTION INTRAVENOUS at 09:11

## 2018-12-13 RX ADMIN — HEPARIN SODIUM 5000 UNITS: 5000 INJECTION, SOLUTION INTRAVENOUS; SUBCUTANEOUS at 10:16

## 2018-12-13 RX ADMIN — LIDOCAINE HYDROCHLORIDE 10 ML: 20 INJECTION, SOLUTION INFILTRATION; PERINEURAL at 10:16

## 2018-12-13 RX ADMIN — MIDAZOLAM 1 MG: 1 INJECTION INTRAMUSCULAR; INTRAVENOUS at 10:29

## 2018-12-13 RX ADMIN — MIDAZOLAM 1 MG: 1 INJECTION INTRAMUSCULAR; INTRAVENOUS at 10:15

## 2018-12-13 RX ADMIN — FENTANYL CITRATE 25 MCG: 50 INJECTION INTRAMUSCULAR; INTRAVENOUS at 10:29

## 2018-12-13 RX ADMIN — FENTANYL CITRATE 25 MCG: 50 INJECTION INTRAMUSCULAR; INTRAVENOUS at 10:16

## 2018-12-13 RX ADMIN — ASPIRIN 81 MG: 81 TABLET, COATED ORAL at 09:11

## 2019-03-20 ENCOUNTER — TELEPHONE (OUTPATIENT)
Dept: VASCULAR SURGERY | Age: 64
End: 2019-03-20

## 2019-03-27 ENCOUNTER — PREP FOR PROCEDURE (OUTPATIENT)
Dept: VASCULAR SURGERY | Age: 64
End: 2019-03-27

## 2019-03-27 RX ORDER — SODIUM CHLORIDE 9 MG/ML
INJECTION, SOLUTION INTRAVENOUS CONTINUOUS
Status: CANCELLED | OUTPATIENT
Start: 2019-03-27

## 2019-03-27 RX ORDER — SODIUM CHLORIDE 0.9 % (FLUSH) 0.9 %
10 SYRINGE (ML) INJECTION PRN
Status: CANCELLED | OUTPATIENT
Start: 2019-03-27

## 2019-03-27 RX ORDER — ASPIRIN 81 MG/1
81 TABLET ORAL ONCE
Status: CANCELLED | OUTPATIENT
Start: 2019-03-27 | End: 2019-03-27

## 2019-03-27 RX ORDER — CLONIDINE HYDROCHLORIDE 0.1 MG/1
0.1 TABLET ORAL PRN
Status: CANCELLED | OUTPATIENT
Start: 2019-03-27

## 2019-03-28 ENCOUNTER — HOSPITAL ENCOUNTER (OUTPATIENT)
Dept: INTERVENTIONAL RADIOLOGY/VASCULAR | Age: 64
Discharge: HOME | End: 2019-03-28
Payer: MEDICARE

## 2019-03-28 VITALS
HEART RATE: 70 BPM | OXYGEN SATURATION: 97 % | RESPIRATION RATE: 16 BRPM | HEIGHT: 71 IN | WEIGHT: 185 LBS | BODY MASS INDEX: 25.9 KG/M2 | DIASTOLIC BLOOD PRESSURE: 71 MMHG | SYSTOLIC BLOOD PRESSURE: 137 MMHG | TEMPERATURE: 98.1 F

## 2019-03-28 DIAGNOSIS — T82.590A DIALYSIS AV FISTULA MALFUNCTION, INITIAL ENCOUNTER (HCC): ICD-10-CM

## 2019-03-28 PROBLEM — N18.4 CHRONIC KIDNEY DISEASE (CKD), STAGE IV (SEVERE) (HCC): Status: RESOLVED | Noted: 2018-06-29 | Resolved: 2019-03-28

## 2019-03-28 PROCEDURE — 36901 INTRO CATH DIALYSIS CIRCUIT: CPT | Performed by: SURGERY

## 2019-03-28 PROCEDURE — 2709999900 IR GUIDED INTRO CATH DIALYSIS CIRCUIT

## 2019-03-28 PROCEDURE — 99152 MOD SED SAME PHYS/QHP 5/>YRS: CPT | Performed by: SURGERY

## 2019-03-28 PROCEDURE — 2580000003 HC RX 258: Performed by: NURSE PRACTITIONER

## 2019-03-28 PROCEDURE — 2500000003 HC RX 250 WO HCPCS: Performed by: SURGERY

## 2019-03-28 PROCEDURE — 99153 MOD SED SAME PHYS/QHP EA: CPT | Performed by: SURGERY

## 2019-03-28 PROCEDURE — 6360000004 HC RX CONTRAST MEDICATION: Performed by: SURGERY

## 2019-03-28 PROCEDURE — 6370000000 HC RX 637 (ALT 250 FOR IP): Performed by: NURSE PRACTITIONER

## 2019-03-28 PROCEDURE — 6360000002 HC RX W HCPCS: Performed by: SURGERY

## 2019-03-28 RX ORDER — SODIUM CHLORIDE 0.9 % (FLUSH) 0.9 %
10 SYRINGE (ML) INJECTION PRN
Status: DISCONTINUED | OUTPATIENT
Start: 2019-03-28 | End: 2019-03-30 | Stop reason: HOSPADM

## 2019-03-28 RX ORDER — SODIUM CHLORIDE 9 MG/ML
INJECTION, SOLUTION INTRAVENOUS CONTINUOUS
Status: DISCONTINUED | OUTPATIENT
Start: 2019-03-28 | End: 2019-03-30 | Stop reason: HOSPADM

## 2019-03-28 RX ORDER — FENTANYL CITRATE 50 UG/ML
INJECTION, SOLUTION INTRAMUSCULAR; INTRAVENOUS
Status: COMPLETED | OUTPATIENT
Start: 2019-03-28 | End: 2019-03-28

## 2019-03-28 RX ORDER — CLONIDINE HYDROCHLORIDE 0.1 MG/1
0.1 TABLET ORAL PRN
Status: DISCONTINUED | OUTPATIENT
Start: 2019-03-28 | End: 2019-03-30 | Stop reason: HOSPADM

## 2019-03-28 RX ORDER — MIDAZOLAM HYDROCHLORIDE 1 MG/ML
INJECTION INTRAMUSCULAR; INTRAVENOUS
Status: COMPLETED | OUTPATIENT
Start: 2019-03-28 | End: 2019-03-28

## 2019-03-28 RX ORDER — ONDANSETRON 2 MG/ML
4 INJECTION INTRAMUSCULAR; INTRAVENOUS EVERY 8 HOURS PRN
Status: DISCONTINUED | OUTPATIENT
Start: 2019-03-28 | End: 2019-03-30 | Stop reason: HOSPADM

## 2019-03-28 RX ORDER — HYDROCODONE BITARTRATE AND ACETAMINOPHEN 5; 325 MG/1; MG/1
1 TABLET ORAL EVERY 4 HOURS PRN
Status: DISCONTINUED | OUTPATIENT
Start: 2019-03-28 | End: 2019-03-30 | Stop reason: HOSPADM

## 2019-03-28 RX ORDER — HEPARIN SODIUM 5000 [USP'U]/ML
INJECTION, SOLUTION INTRAVENOUS; SUBCUTANEOUS
Status: COMPLETED | OUTPATIENT
Start: 2019-03-28 | End: 2019-03-28

## 2019-03-28 RX ORDER — ASPIRIN 81 MG/1
81 TABLET ORAL ONCE
Status: COMPLETED | OUTPATIENT
Start: 2019-03-28 | End: 2019-03-28

## 2019-03-28 RX ORDER — ACETAMINOPHEN 325 MG/1
650 TABLET ORAL EVERY 4 HOURS PRN
Status: DISCONTINUED | OUTPATIENT
Start: 2019-03-28 | End: 2019-03-30 | Stop reason: HOSPADM

## 2019-03-28 RX ORDER — LIDOCAINE HYDROCHLORIDE 20 MG/ML
INJECTION, SOLUTION INFILTRATION; PERINEURAL
Status: COMPLETED | OUTPATIENT
Start: 2019-03-28 | End: 2019-03-28

## 2019-03-28 RX ORDER — HYDROCODONE BITARTRATE AND ACETAMINOPHEN 5; 325 MG/1; MG/1
2 TABLET ORAL EVERY 4 HOURS PRN
Status: DISCONTINUED | OUTPATIENT
Start: 2019-03-28 | End: 2019-03-30 | Stop reason: HOSPADM

## 2019-03-28 RX ADMIN — HEPARIN SODIUM 5000 UNITS: 5000 INJECTION, SOLUTION INTRAVENOUS; SUBCUTANEOUS at 12:08

## 2019-03-28 RX ADMIN — FENTANYL CITRATE 25 MCG: 50 INJECTION INTRAMUSCULAR; INTRAVENOUS at 12:08

## 2019-03-28 RX ADMIN — IOPAMIDOL 15 ML: 612 INJECTION, SOLUTION INTRATHECAL at 12:13

## 2019-03-28 RX ADMIN — SODIUM CHLORIDE: 9 INJECTION, SOLUTION INTRAVENOUS at 10:13

## 2019-03-28 RX ADMIN — MIDAZOLAM 0.5 MG: 1 INJECTION INTRAMUSCULAR; INTRAVENOUS at 12:07

## 2019-03-28 RX ADMIN — ASPIRIN 81 MG: 81 TABLET ORAL at 10:15

## 2019-03-28 RX ADMIN — LIDOCAINE HYDROCHLORIDE 5 ML: 20 INJECTION, SOLUTION INFILTRATION; PERINEURAL at 12:08

## 2019-06-05 ENCOUNTER — TELEPHONE (OUTPATIENT)
Dept: VASCULAR SURGERY | Age: 64
End: 2019-06-05

## 2019-06-05 NOTE — TELEPHONE ENCOUNTER
Dialysis reports having increased difficulty with cannulation, feeling the access itself is too small and decreased flow rate, I discussed the situation with S[facility], Pt has been provided an office appointment to discuss Creation LUE Upper Arm AV Brachial/Cephalic AVF with P'cath placement. I spoke with Deshaun Petersen at [facility] to notify her of this appt. Phyllis voiced understanding.  OB

## 2019-06-10 ENCOUNTER — TELEPHONE (OUTPATIENT)
Dept: VASCULAR SURGERY | Age: 64
End: 2019-06-10

## 2019-06-10 NOTE — TELEPHONE ENCOUNTER
Joanna Lowry requests that  Nurse return their call. The best time to reach him is Anytime. Patient daughter [facility] would like to discuss what the patient appt for Tues 6-11-19. Thank you.

## 2019-06-10 NOTE — TELEPHONE ENCOUNTER
Apt is to discuss Creation AV Access LUE with "[employer]. Daughter and unit notified.  OB

## 2019-12-19 ENCOUNTER — OFFICE VISIT (OUTPATIENT)
Dept: VASCULAR SURGERY | Age: 64
End: 2019-12-19
Payer: MEDICARE

## 2019-12-19 VITALS
RESPIRATION RATE: 18 BRPM | OXYGEN SATURATION: 96 % | HEART RATE: 84 BPM | DIASTOLIC BLOOD PRESSURE: 68 MMHG | SYSTOLIC BLOOD PRESSURE: 125 MMHG | TEMPERATURE: 98 F

## 2019-12-19 DIAGNOSIS — N18.6 CKD (CHRONIC KIDNEY DISEASE) STAGE V REQUIRING CHRONIC DIALYSIS (HCC): ICD-10-CM

## 2019-12-19 DIAGNOSIS — Z99.2 CKD (CHRONIC KIDNEY DISEASE) STAGE V REQUIRING CHRONIC DIALYSIS (HCC): ICD-10-CM

## 2019-12-19 PROCEDURE — G8419 CALC BMI OUT NRM PARAM NOF/U: HCPCS | Performed by: PHYSICIAN ASSISTANT

## 2019-12-19 PROCEDURE — 1036F TOBACCO NON-USER: CPT | Performed by: PHYSICIAN ASSISTANT

## 2019-12-19 PROCEDURE — G8484 FLU IMMUNIZE NO ADMIN: HCPCS | Performed by: PHYSICIAN ASSISTANT

## 2019-12-19 PROCEDURE — G8427 DOCREV CUR MEDS BY ELIG CLIN: HCPCS | Performed by: PHYSICIAN ASSISTANT

## 2019-12-19 PROCEDURE — 99214 OFFICE O/P EST MOD 30 MIN: CPT | Performed by: PHYSICIAN ASSISTANT

## 2019-12-19 PROCEDURE — 3017F COLORECTAL CA SCREEN DOC REV: CPT | Performed by: PHYSICIAN ASSISTANT

## 2019-12-19 RX ORDER — LANOLIN ALCOHOL/MO/W.PET/CERES
10 CREAM (GRAM) TOPICAL NIGHTLY PRN
COMMUNITY

## 2019-12-19 RX ORDER — ACETAMINOPHEN 500 MG
500 TABLET ORAL EVERY 6 HOURS PRN
COMMUNITY

## 2020-01-03 ENCOUNTER — TELEPHONE (OUTPATIENT)
Dept: VASCULAR SURGERY | Age: 65
End: 2020-01-03

## 2020-01-06 ENCOUNTER — TELEPHONE (OUTPATIENT)
Dept: VASCULAR SURGERY | Age: 65
End: 2020-01-06

## 2020-01-09 ENCOUNTER — TELEPHONE (OUTPATIENT)
Dept: VASCULAR SURGERY | Age: 65
End: 2020-01-09

## 2020-01-09 NOTE — TELEPHONE ENCOUNTER
Spoke with patient in regards to scheduling his surgery with Dr. Migdalia Lai. Patient request that I call details/instructions to his daughter Chantal Kelly. Spoke with Patty Tiffanydimple, details/instructions and medications were addressed (see letter) for surgery at [facility] on 2/4/20 with Dr. Migdalia Lai. He will need to arrive at the [address] [place] [address] at 8:00am. He will need a  to take him home. He will need to register at the [address] [place] [address] on 1/23/20 at 1:30pm for pre-op testing. Patty Franklin verbalizes understanding and written instructions were mailed to the patient. Patty Franklin states that her dad wanted to know if they can balloon his current AV access to last long enough for his new access to mature instead of possible placing Perma-cath. After talking with Annamarie Lewis PA-C informed her and patient that the veins in his current AV access is too small to balloon and that is the reason for possible Perma-cath Placement to have an access available in case he is unable to get adequate dialysis treatment with his current access. Patient has more questions about the Possible Perma-cath and would like to speak with [facility]. Message was given to [facility] and she will call the patient.

## 2020-01-10 ENCOUNTER — TELEPHONE (OUTPATIENT)
Dept: VASCULAR SURGERY | Age: 65
End: 2020-01-10

## 2020-01-10 NOTE — TELEPHONE ENCOUNTER
Left message x 2 for patient to call our office  regarding his upcoming surgery and questions. Awaiting call back.

## 2020-01-23 ENCOUNTER — HOSPITAL ENCOUNTER (OUTPATIENT)
Dept: PREADMISSION TESTING | Age: 65
Discharge: HOME | End: 2020-01-27
Payer: MEDICARE

## 2020-01-23 ENCOUNTER — HOSPITAL ENCOUNTER (OUTPATIENT)
Dept: GENERAL RADIOLOGY | Age: 65
Discharge: HOME | End: 2020-01-23
Payer: MEDICARE

## 2020-01-23 VITALS — BODY MASS INDEX: 25.9 KG/M2 | HEIGHT: 71 IN | WEIGHT: 185 LBS

## 2020-01-23 PROCEDURE — 93005 ELECTROCARDIOGRAM TRACING: CPT

## 2020-01-23 PROCEDURE — 87081 CULTURE SCREEN ONLY: CPT

## 2020-01-23 PROCEDURE — 71046 X-RAY EXAM CHEST 2 VIEWS: CPT

## 2020-01-23 RX ORDER — LOSARTAN POTASSIUM 25 MG/1
25 TABLET ORAL
COMMUNITY
End: 2021-10-28

## 2020-01-24 LAB — MRSA CULTURE ONLY: NORMAL

## 2020-01-26 PROCEDURE — 93010 ELECTROCARDIOGRAM REPORT: CPT | Performed by: INTERNAL MEDICINE

## 2020-01-30 LAB
EKG P AXIS: 45 DEGREES
EKG P-R INTERVAL: 218 MS
EKG Q-T INTERVAL: 400 MS
EKG QRS DURATION: 144 MS
EKG QTC CALCULATION (BAZETT): 457 MS
EKG T AXIS: 20 DEGREES

## 2020-02-03 ENCOUNTER — PREP FOR PROCEDURE (OUTPATIENT)
Dept: VASCULAR SURGERY | Age: 65
End: 2020-02-03

## 2020-02-03 RX ORDER — SODIUM CHLORIDE 0.9 % (FLUSH) 0.9 %
10 SYRINGE (ML) INJECTION PRN
Status: CANCELLED | OUTPATIENT
Start: 2020-02-03

## 2020-02-03 RX ORDER — SODIUM CHLORIDE 0.9 % (FLUSH) 0.9 %
10 SYRINGE (ML) INJECTION EVERY 12 HOURS SCHEDULED
Status: CANCELLED | OUTPATIENT
Start: 2020-02-03

## 2020-02-03 RX ORDER — ASPIRIN 81 MG/1
81 TABLET ORAL ONCE
Status: CANCELLED | OUTPATIENT
Start: 2020-02-03 | End: 2020-02-03

## 2020-02-03 NOTE — H&P
facility-administered medications for this visit. Allergies: Patient has no known allergies. Past Medical History:   Diagnosis Date    Arthritis     Chronic kidney disease     CKD (chronic kidney disease) stage V requiring chronic dialysis ([facility] 12/19/2019    Diabetes mellitus ([facility]     Diabetic retinopathy ([facility]     Digestive disorder     \" slow digestion \"    Hemodialysis patient ([facility]     Hyperlipemia     Hypertension     Legally blind     Neuropathy     feet     Past Surgical History:   Procedure Laterality Date    CATARACT REMOVAL      EYE SURGERY      [place] ANASTOMOSIS,AV,ANY SITE Left 7/2/2018    ARM RADICAL/ CEPHALIC AV FISTULA performed by Ike Ortiz MD at [address]  12/13/2018    SJS. Left upper fistulograms, BA distal forearm cephalic vein [id number] cutter,coil branch (5x3(2)    VASCULAR SURGERY  03/28/2019    S[facility]. Left upper fistulograms/venograms. Family History   Problem Relation Age of Onset   Lev Chard Cancer Mother     Other Mother         COPD    Other Father         tractor accident     Social History     Tobacco Use    Smoking status: Never Smoker    Smokeless tobacco: Never Used   Substance Use Topics    Alcohol use: No       Review of Systems    Constitutional - no significant activity change, appetite change, or unexpected weight change. No fever or chills. No diaphoresis or significant fatigue. HENT - no significant rhinorrhea or epistaxis. No tinnitus or significant hearing loss. Eyes - no sudden vision change or amaurosis. Respiratory - no significant shortness of breath, wheezing, or stridor. No apnea, cough, or chest tightness associated with shortness of breath. Cardiovascular - no chest pain, syncope, or significant dizziness. No palpitations or significant leg swelling. No claudication. Gastrointestinal - no abdominal swelling or pain. No blood in stool. No severe constipation, diarrhea, nausea, or vomiting.   Genitourinary - No dysuria, have been discussed with the patient including continued medical management vs. proceeding with left upper extremity brachiocephalic AV fistula. Risks have been discussed with the patient including but not limited to MI, death, CVA, bleed, nerve injury, steal, and infection. Patient would like to come in and to discuss with Dr. Cody Alvarez. Assessment    1. CKD (chronic kidney disease) stage V requiring chronic dialysis (HCC)      Left forearm (distal radial cephalic) av fistula malfunction    Plan      Options have been discussed with the patient including continued medical management vs. proceeding with left upper extremity brachiocephalic AV fistula. Risks have been discussed with the patient including but not limited to MI, death, CVA, bleed, nerve injury, steal, and infection.         Proceed with Left brachial cephalic AV fistula; possible placement of Permcath

## 2020-02-03 NOTE — H&P (VIEW-ONLY)
Patient Care Team:  Marlon Jordan MD as PCP - General (Family Medicine)  Truman Olivo MD as Consulting Physician (Nephrology)      History and Physical    Mr. Kaylee Atkinson is a 60 yo male with a history of chronic kidney disease on chronic dialysis. He had a left radial-cephalic AV fistula created on 7/2/18 by Dr. Anjelica Dunn. This required BA distal FA cephalic vein on 41/06/89 by Dr. Joseph Tafoya. He underwent a left upper extremity fistulogram on 3/28/2019 with no intervention was done at that time. Since the patient's fistula continued to be difficult to cannulate Dr. Joseph Tafoya recommended considering creating a new left upper extremity brachial cephalic AV fistula. Patient comes in today to discuss possible options. Efe Jiménez is a 59 y.o. male with the following history reviewed and recorded in [facility]:  Patient Active Problem List    Diagnosis Date Noted    CKD (chronic kidney disease) stage V requiring chronic dialysis ([facility] 12/19/2019    Dialysis AV fistula malfunction, initial encounter ([facility] 12/13/2018     Current Outpatient Medications   Medication Sig Dispense Refill    melatonin 3 MG TABS tablet Take 3 mg by mouth nightly as needed       acetaminophen (TYLENOL) 500 MG tablet Take 500 mg by mouth every 6 hours as needed for Pain      fenofibrate 160 MG tablet Take 160 mg by mouth daily      indapamide (LOZOL) 2.5 MG tablet Take 1 tablet by mouth daily      metoclopramide (REGLAN) 5 MG tablet Take 5 mg by mouth 4 times daily      simvastatin (ZOCOR) 80 MG tablet Take 80 mg by mouth daily      temazepam (RESTORIL) 15 MG capsule Take 15 mg by mouth nightly. [facility] aspirin 81 MG tablet Take 81 mg by mouth daily      losartan (COZAAR) 25 MG tablet Take 25 mg by mouth three times a week at night before dialysis      insulin aspart protamine-insulin aspart (NOVOLOG MIX 70/30 FLEXPEN) (70-30) 100 UNIT/ML injection Inject 42 Units into the skin 2 times daily (with meals)       No current facility-administered medications for this visit. Allergies: Patient has no known allergies. Past Medical History:   Diagnosis Date    Arthritis     Chronic kidney disease     CKD (chronic kidney disease) stage V requiring chronic dialysis ([facility] 12/19/2019    Diabetes mellitus ([facility]     Diabetic retinopathy ([facility]     Digestive disorder     \" slow digestion \"    Hemodialysis patient ([facility]     Hyperlipemia     Hypertension     Legally blind     Neuropathy     feet     Past Surgical History:   Procedure Laterality Date    CATARACT REMOVAL      EYE SURGERY      [place] ANASTOMOSIS,AV,ANY SITE Left 7/2/2018    ARM RADICAL/ CEPHALIC AV FISTULA performed by Jerry Arthur MD at [address]  12/13/2018    SJS. Left upper fistulograms, BA distal forearm cephalic vein [id number] cutter,coil branch (5x3(2)    VASCULAR SURGERY  03/28/2019    S[facility]. Left upper fistulograms/venograms. Family History   Problem Relation Age of Onset   Dukes Cancer Mother     Other Mother         COPD    Other Father         tractor accident     Social History     Tobacco Use    Smoking status: Never Smoker    Smokeless tobacco: Never Used   Substance Use Topics    Alcohol use: No       Review of Systems    Constitutional - no significant activity change, appetite change, or unexpected weight change. No fever or chills. No diaphoresis or significant fatigue. HENT - no significant rhinorrhea or epistaxis. No tinnitus or significant hearing loss. Eyes - no sudden vision change or amaurosis. Respiratory - no significant shortness of breath, wheezing, or stridor. No apnea, cough, or chest tightness associated with shortness of breath. Cardiovascular - no chest pain, syncope, or significant dizziness. No palpitations or significant leg swelling. No claudication. Gastrointestinal - no abdominal swelling or pain. No blood in stool. No severe constipation, diarrhea, nausea, or vomiting.   Genitourinary - No dysuria, frequency, or urgency. No flank pain or hematuria. Musculoskeletal - no back pain, gait disturbance, or myalgia. Skin - no color change, rash, pallor, or new wound. Neurologic - no dizziness, facial asymmetry, or light headedness. No seizures. No speech difficulty or lateralizing weakness. Hematologic - no easy bruising or excessive bleeding. Psychiatric - no severe anxiety or nervousness. No confusion. All other review of systems are negative. Physical Exam    /68 Comment: right  Pulse 84   Temp 98 °F (36.7 °C)   Resp 18   SpO2 96%     Constitutional - well developed, well nourished. No diaphoresis or acute distress. HENT - head normocephalic. Right external ear canal appears normal.  Left external ear canal appears normal.  Septum appears midline. Eyes - conjunctiva normal.  EOMS normal.  No exudate. No icterus. Neck- ROM appears normal, no tracheal deviation. Cardiovascular - Regular rate and rhythm. Heart sounds are normal.  No murmur, rub, or gallop. Carotid pulses are 2+ to palpation bilaterally without bruit. Extremities - Radial and radial pulses are 2+ to palpation bilaterally. No cyanosis, clubbing, or significant edema. No signs atheroembolic event. Palmar arch intact bilaterally. Patient's left upper extremity forearm AV fistula has a bruit and thrill however in the mid section of the fistula it is pulsatile. Pulmonary - effort appears normal.  No respiratory distress. Lungs - Breath sounds normal. No wheezes or rales. GI - Abdomen - soft, non tender, bowel sounds X 4 quadrants. No guarding or rebound tenderness. No distension or palpable mass. Genitourinary - deferred. Musculoskeletal - ROM appears normal.  No significant edema. Neurologic - alert and oriented X 3. Physiologic. Skin - warm, dry, and intact. No rash, erythema, or pallor.   Psychiatric - mood, affect, and behavior appear normal.  Judgment and thought processes appear normal.    Options have been discussed with the patient including continued medical management vs. proceeding with left upper extremity brachiocephalic AV fistula. Risks have been discussed with the patient including but not limited to MI, death, CVA, bleed, nerve injury, steal, and infection. Patient would like to come in and to discuss with Dr. Alanis Salas. Assessment    1. CKD (chronic kidney disease) stage V requiring chronic dialysis (HCC)      Left forearm (distal radial cephalic) av fistula malfunction    Plan      Options have been discussed with the patient including continued medical management vs. proceeding with left upper extremity brachiocephalic AV fistula. Risks have been discussed with the patient including but not limited to MI, death, CVA, bleed, nerve injury, steal, and infection.         Proceed with Left brachial cephalic AV fistula; possible placement of Permcath

## 2020-02-04 ENCOUNTER — ANESTHESIA EVENT (OUTPATIENT)
Dept: OPERATING ROOM | Age: 65
End: 2020-02-04
Payer: MEDICARE

## 2020-02-04 ENCOUNTER — ANESTHESIA (OUTPATIENT)
Dept: OPERATING ROOM | Age: 65
End: 2020-02-04
Payer: MEDICARE

## 2020-02-04 ENCOUNTER — HOSPITAL ENCOUNTER (OUTPATIENT)
Dept: INTERVENTIONAL RADIOLOGY/VASCULAR | Age: 65
Discharge: HOME | End: 2020-02-04
Payer: MEDICARE

## 2020-02-04 ENCOUNTER — HOSPITAL ENCOUNTER (OUTPATIENT)
Age: 65
Setting detail: OUTPATIENT SURGERY
Discharge: HOME | End: 2020-02-04
Attending: SURGERY | Admitting: SURGERY
Payer: MEDICARE

## 2020-02-04 VITALS
WEIGHT: 185 LBS | HEIGHT: 71 IN | OXYGEN SATURATION: 89 % | TEMPERATURE: 97.8 F | BODY MASS INDEX: 25.9 KG/M2 | HEART RATE: 100 BPM | RESPIRATION RATE: 18 BRPM | SYSTOLIC BLOOD PRESSURE: 124 MMHG | DIASTOLIC BLOOD PRESSURE: 66 MMHG

## 2020-02-04 VITALS
TEMPERATURE: 97 F | OXYGEN SATURATION: 95 % | DIASTOLIC BLOOD PRESSURE: 72 MMHG | RESPIRATION RATE: 2 BRPM | SYSTOLIC BLOOD PRESSURE: 122 MMHG

## 2020-02-04 PROBLEM — T82.590D DIALYSIS AV FISTULA MALFUNCTION, SUBSEQUENT ENCOUNTER: Status: ACTIVE | Noted: 2018-12-13

## 2020-02-04 LAB
ANION GAP SERPL CALCULATED.3IONS-SCNC: 16 MMOL/L (ref 7–19)
APTT: 28.1 SEC (ref 26–36.2)
BASOPHILS ABSOLUTE: 0.1 K/UL (ref 0–0.2)
BASOPHILS RELATIVE PERCENT: 0.8 % (ref 0–1)
BUN BLDV-MCNC: 47 MG/DL (ref 8–23)
CALCIUM SERPL-MCNC: 9.9 MG/DL (ref 8.8–10.2)
CHLORIDE BLD-SCNC: 97 MMOL/L (ref 98–111)
CO2: 28 MMOL/L (ref 22–29)
CREAT SERPL-MCNC: 5.1 MG/DL (ref 0.5–1.2)
EOSINOPHILS ABSOLUTE: 0.1 K/UL (ref 0–0.6)
EOSINOPHILS RELATIVE PERCENT: 1.7 % (ref 0–5)
GFR NON-AFRICAN AMERICAN: 11
GLUCOSE BLD-MCNC: 215 MG/DL (ref 70–99)
GLUCOSE BLD-MCNC: 221 MG/DL (ref 74–109)
HCT VFR BLD CALC: 36.1 % (ref 42–52)
HEMOGLOBIN: 11.5 G/DL (ref 14–18)
IMMATURE GRANULOCYTES #: 0 K/UL
INR BLD: 1.07 (ref 0.88–1.18)
LYMPHOCYTES ABSOLUTE: 2.3 K/UL (ref 1.1–4.5)
LYMPHOCYTES RELATIVE PERCENT: 31.5 % (ref 20–40)
MCH RBC QN AUTO: 29.3 PG (ref 27–31)
MCHC RBC AUTO-ENTMCNC: 31.9 G/DL (ref 33–37)
MCV RBC AUTO: 92.1 FL (ref 80–94)
MONOCYTES ABSOLUTE: 0.8 K/UL (ref 0–0.9)
MONOCYTES RELATIVE PERCENT: 11.4 % (ref 0–10)
NEUTROPHILS ABSOLUTE: 3.9 K/UL (ref 1.5–7.5)
NEUTROPHILS RELATIVE PERCENT: 54.3 % (ref 50–65)
PDW BLD-RTO: 14.2 % (ref 11.5–14.5)
PERFORMED ON: ABNORMAL
PLATELET # BLD: 288 K/UL (ref 130–400)
PMV BLD AUTO: 9.8 FL (ref 9.4–12.4)
POTASSIUM REFLEX MAGNESIUM: 4.1 MMOL/L (ref 3.5–4.9)
PROTHROMBIN TIME: 13.3 SEC (ref 12–14.6)
RBC # BLD: 3.92 M/UL (ref 4.7–6.1)
SODIUM BLD-SCNC: 141 MMOL/L (ref 136–145)
WBC # BLD: 7.2 K/UL (ref 4.8–10.8)

## 2020-02-04 PROCEDURE — 3700000000 HC ANESTHESIA ATTENDED CARE: Performed by: SURGERY

## 2020-02-04 PROCEDURE — 6360000002 HC RX W HCPCS: Performed by: PHYSICIAN ASSISTANT

## 2020-02-04 PROCEDURE — 3600000014 HC SURGERY LEVEL 4 ADDTL 15MIN: Performed by: SURGERY

## 2020-02-04 PROCEDURE — 2500000003 HC RX 250 WO HCPCS: Performed by: NURSE ANESTHETIST, CERTIFIED REGISTERED

## 2020-02-04 PROCEDURE — 77001 FLUOROGUIDE FOR VEIN DEVICE: CPT | Performed by: SURGERY

## 2020-02-04 PROCEDURE — 64415 NJX AA&/STRD BRCH PLXS IMG: CPT | Performed by: NURSE ANESTHETIST, CERTIFIED REGISTERED

## 2020-02-04 PROCEDURE — C1750 CATH, HEMODIALYSIS,LONG-TERM: HCPCS | Performed by: SURGERY

## 2020-02-04 PROCEDURE — 85025 COMPLETE CBC W/AUTO DIFF WBC: CPT

## 2020-02-04 PROCEDURE — 76937 US GUIDE VASCULAR ACCESS: CPT | Performed by: SURGERY

## 2020-02-04 PROCEDURE — 6360000002 HC RX W HCPCS: Performed by: SURGERY

## 2020-02-04 PROCEDURE — C1769 GUIDE WIRE: HCPCS | Performed by: SURGERY

## 2020-02-04 PROCEDURE — 36821 AV FUSION DIRECT ANY SITE: CPT | Performed by: SURGERY

## 2020-02-04 PROCEDURE — 80048 BASIC METABOLIC PNL TOTAL CA: CPT

## 2020-02-04 PROCEDURE — 7100000001 HC PACU RECOVERY - ADDTL 15 MIN: Performed by: SURGERY

## 2020-02-04 PROCEDURE — 85730 THROMBOPLASTIN TIME PARTIAL: CPT

## 2020-02-04 PROCEDURE — 6360000002 HC RX W HCPCS: Performed by: ANESTHESIOLOGY

## 2020-02-04 PROCEDURE — 36558 INSERT TUNNELED CV CATH: CPT | Performed by: SURGERY

## 2020-02-04 PROCEDURE — 3600000004 HC SURGERY LEVEL 4 BASE: Performed by: SURGERY

## 2020-02-04 PROCEDURE — 6360000002 HC RX W HCPCS

## 2020-02-04 PROCEDURE — 85610 PROTHROMBIN TIME: CPT

## 2020-02-04 PROCEDURE — 82948 REAGENT STRIP/BLOOD GLUCOSE: CPT

## 2020-02-04 PROCEDURE — 36415 COLL VENOUS BLD VENIPUNCTURE: CPT

## 2020-02-04 PROCEDURE — 7100000010 HC PHASE II RECOVERY - FIRST 15 MIN: Performed by: SURGERY

## 2020-02-04 PROCEDURE — 2709999900 HC NON-CHARGEABLE SUPPLY: Performed by: SURGERY

## 2020-02-04 PROCEDURE — 7100000011 HC PHASE II RECOVERY - ADDTL 15 MIN: Performed by: SURGERY

## 2020-02-04 PROCEDURE — 7100000000 HC PACU RECOVERY - FIRST 15 MIN: Performed by: SURGERY

## 2020-02-04 PROCEDURE — 2580000003 HC RX 258: Performed by: SURGERY

## 2020-02-04 PROCEDURE — 3700000001 HC ADD 15 MINUTES (ANESTHESIA): Performed by: SURGERY

## 2020-02-04 PROCEDURE — 6360000002 HC RX W HCPCS: Performed by: NURSE ANESTHETIST, CERTIFIED REGISTERED

## 2020-02-04 PROCEDURE — 2580000003 HC RX 258: Performed by: ANESTHESIOLOGY

## 2020-02-04 RX ORDER — DEXAMETHASONE SODIUM PHOSPHATE 4 MG/ML
INJECTION, SOLUTION INTRA-ARTICULAR; INTRALESIONAL; INTRAMUSCULAR; INTRAVENOUS; SOFT TISSUE PRN
Status: DISCONTINUED | OUTPATIENT
Start: 2020-02-04 | End: 2020-02-04 | Stop reason: SDUPTHER

## 2020-02-04 RX ORDER — HYDRALAZINE HYDROCHLORIDE 20 MG/ML
5 INJECTION INTRAMUSCULAR; INTRAVENOUS EVERY 10 MIN PRN
Status: DISCONTINUED | OUTPATIENT
Start: 2020-02-04 | End: 2020-02-04 | Stop reason: HOSPADM

## 2020-02-04 RX ORDER — METOCLOPRAMIDE HYDROCHLORIDE 5 MG/ML
10 INJECTION INTRAMUSCULAR; INTRAVENOUS
Status: DISCONTINUED | OUTPATIENT
Start: 2020-02-04 | End: 2020-02-04 | Stop reason: HOSPADM

## 2020-02-04 RX ORDER — PROPOFOL 10 MG/ML
INJECTION, EMULSION INTRAVENOUS PRN
Status: DISCONTINUED | OUTPATIENT
Start: 2020-02-04 | End: 2020-02-04 | Stop reason: SDUPTHER

## 2020-02-04 RX ORDER — VANCOMYCIN HYDROCHLORIDE 1 G/20ML
INJECTION, POWDER, LYOPHILIZED, FOR SOLUTION INTRAVENOUS PRN
Status: DISCONTINUED | OUTPATIENT
Start: 2020-02-04 | End: 2020-02-04 | Stop reason: SDUPTHER

## 2020-02-04 RX ORDER — FENTANYL CITRATE 50 UG/ML
50 INJECTION, SOLUTION INTRAMUSCULAR; INTRAVENOUS
Status: DISCONTINUED | OUTPATIENT
Start: 2020-02-04 | End: 2020-02-04 | Stop reason: HOSPADM

## 2020-02-04 RX ORDER — HEPARIN SODIUM (PORCINE) LOCK FLUSH IV SOLN 100 UNIT/ML 100 UNIT/ML
SOLUTION INTRAVENOUS PRN
Status: DISCONTINUED | OUTPATIENT
Start: 2020-02-04 | End: 2020-02-04 | Stop reason: ALTCHOICE

## 2020-02-04 RX ORDER — HEPARIN SODIUM 1000 [USP'U]/ML
INJECTION, SOLUTION INTRAVENOUS; SUBCUTANEOUS PRN
Status: DISCONTINUED | OUTPATIENT
Start: 2020-02-04 | End: 2020-02-04 | Stop reason: SDUPTHER

## 2020-02-04 RX ORDER — MORPHINE SULFATE 4 MG/ML
4 INJECTION, SOLUTION INTRAMUSCULAR; INTRAVENOUS EVERY 5 MIN PRN
Status: DISCONTINUED | OUTPATIENT
Start: 2020-02-04 | End: 2020-02-04 | Stop reason: HOSPADM

## 2020-02-04 RX ORDER — FENTANYL CITRATE 50 UG/ML
INJECTION, SOLUTION INTRAMUSCULAR; INTRAVENOUS PRN
Status: DISCONTINUED | OUTPATIENT
Start: 2020-02-04 | End: 2020-02-04 | Stop reason: SDUPTHER

## 2020-02-04 RX ORDER — HYDROCODONE BITARTRATE AND ACETAMINOPHEN 5; 325 MG/1; MG/1
1 TABLET ORAL EVERY 4 HOURS PRN
Status: DISCONTINUED | OUTPATIENT
Start: 2020-02-04 | End: 2020-02-06 | Stop reason: HOSPADM

## 2020-02-04 RX ORDER — DIPHENHYDRAMINE HYDROCHLORIDE 50 MG/ML
12.5 INJECTION INTRAMUSCULAR; INTRAVENOUS
Status: DISCONTINUED | OUTPATIENT
Start: 2020-02-04 | End: 2020-02-04 | Stop reason: HOSPADM

## 2020-02-04 RX ORDER — MORPHINE SULFATE 4 MG/ML
2 INJECTION, SOLUTION INTRAMUSCULAR; INTRAVENOUS EVERY 5 MIN PRN
Status: DISCONTINUED | OUTPATIENT
Start: 2020-02-04 | End: 2020-02-04 | Stop reason: HOSPADM

## 2020-02-04 RX ORDER — ROPIVACAINE HYDROCHLORIDE 5 MG/ML
INJECTION, SOLUTION EPIDURAL; INFILTRATION; PERINEURAL
Status: COMPLETED | OUTPATIENT
Start: 2020-02-04 | End: 2020-02-04

## 2020-02-04 RX ORDER — SODIUM CHLORIDE 0.9 % (FLUSH) 0.9 %
10 SYRINGE (ML) INJECTION EVERY 12 HOURS SCHEDULED
Status: DISCONTINUED | OUTPATIENT
Start: 2020-02-04 | End: 2020-02-04 | Stop reason: HOSPADM

## 2020-02-04 RX ORDER — LIDOCAINE HYDROCHLORIDE 10 MG/ML
INJECTION, SOLUTION INFILTRATION; PERINEURAL PRN
Status: DISCONTINUED | OUTPATIENT
Start: 2020-02-04 | End: 2020-02-04 | Stop reason: SDUPTHER

## 2020-02-04 RX ORDER — ROCURONIUM BROMIDE 10 MG/ML
INJECTION, SOLUTION INTRAVENOUS PRN
Status: DISCONTINUED | OUTPATIENT
Start: 2020-02-04 | End: 2020-02-04 | Stop reason: SDUPTHER

## 2020-02-04 RX ORDER — LIDOCAINE HYDROCHLORIDE 10 MG/ML
1 INJECTION, SOLUTION EPIDURAL; INFILTRATION; INTRACAUDAL; PERINEURAL
Status: DISCONTINUED | OUTPATIENT
Start: 2020-02-04 | End: 2020-02-04 | Stop reason: HOSPADM

## 2020-02-04 RX ORDER — LABETALOL 20 MG/4 ML (5 MG/ML) INTRAVENOUS SYRINGE
5 EVERY 10 MIN PRN
Status: DISCONTINUED | OUTPATIENT
Start: 2020-02-04 | End: 2020-02-04 | Stop reason: HOSPADM

## 2020-02-04 RX ORDER — MIDAZOLAM HYDROCHLORIDE 1 MG/ML
2 INJECTION INTRAMUSCULAR; INTRAVENOUS
Status: COMPLETED | OUTPATIENT
Start: 2020-02-04 | End: 2020-02-04

## 2020-02-04 RX ORDER — EPHEDRINE SULFATE 50 MG/ML
INJECTION, SOLUTION INTRAVENOUS PRN
Status: DISCONTINUED | OUTPATIENT
Start: 2020-02-04 | End: 2020-02-04 | Stop reason: SDUPTHER

## 2020-02-04 RX ORDER — PROMETHAZINE HYDROCHLORIDE 25 MG/ML
6.25 INJECTION, SOLUTION INTRAMUSCULAR; INTRAVENOUS
Status: DISCONTINUED | OUTPATIENT
Start: 2020-02-04 | End: 2020-02-04 | Stop reason: HOSPADM

## 2020-02-04 RX ORDER — ONDANSETRON 2 MG/ML
4 INJECTION INTRAMUSCULAR; INTRAVENOUS EVERY 8 HOURS PRN
Status: DISCONTINUED | OUTPATIENT
Start: 2020-02-04 | End: 2020-02-06 | Stop reason: HOSPADM

## 2020-02-04 RX ORDER — ASPIRIN 81 MG/1
81 TABLET ORAL ONCE
Status: DISCONTINUED | OUTPATIENT
Start: 2020-02-04 | End: 2020-02-04 | Stop reason: HOSPADM

## 2020-02-04 RX ORDER — HYDROCODONE BITARTRATE AND ACETAMINOPHEN 5; 325 MG/1; MG/1
2 TABLET ORAL EVERY 4 HOURS PRN
Status: DISCONTINUED | OUTPATIENT
Start: 2020-02-04 | End: 2020-02-06 | Stop reason: HOSPADM

## 2020-02-04 RX ORDER — ONDANSETRON 2 MG/ML
INJECTION INTRAMUSCULAR; INTRAVENOUS PRN
Status: DISCONTINUED | OUTPATIENT
Start: 2020-02-04 | End: 2020-02-04 | Stop reason: SDUPTHER

## 2020-02-04 RX ORDER — SODIUM CHLORIDE, SODIUM LACTATE, POTASSIUM CHLORIDE, CALCIUM CHLORIDE 600; 310; 30; 20 MG/100ML; MG/100ML; MG/100ML; MG/100ML
INJECTION, SOLUTION INTRAVENOUS CONTINUOUS
Status: DISCONTINUED | OUTPATIENT
Start: 2020-02-04 | End: 2020-02-04 | Stop reason: HOSPADM

## 2020-02-04 RX ORDER — SCOLOPAMINE TRANSDERMAL SYSTEM 1 MG/1
1 PATCH, EXTENDED RELEASE TRANSDERMAL ONCE
Status: DISCONTINUED | OUTPATIENT
Start: 2020-02-04 | End: 2020-02-04 | Stop reason: HOSPADM

## 2020-02-04 RX ORDER — HYDROCODONE BITARTRATE AND ACETAMINOPHEN 5; 325 MG/1; MG/1
1 TABLET ORAL EVERY 8 HOURS PRN
Qty: 20 TABLET | Refills: 0 | Status: SHIPPED | OUTPATIENT
Start: 2020-02-04 | End: 2020-02-11

## 2020-02-04 RX ORDER — SODIUM CHLORIDE 0.9 % (FLUSH) 0.9 %
10 SYRINGE (ML) INJECTION PRN
Status: DISCONTINUED | OUTPATIENT
Start: 2020-02-04 | End: 2020-02-04 | Stop reason: HOSPADM

## 2020-02-04 RX ORDER — SUCCINYLCHOLINE CHLORIDE 20 MG/ML
INJECTION INTRAMUSCULAR; INTRAVENOUS PRN
Status: DISCONTINUED | OUTPATIENT
Start: 2020-02-04 | End: 2020-02-04 | Stop reason: SDUPTHER

## 2020-02-04 RX ORDER — MORPHINE SULFATE 4 MG/ML
4 INJECTION, SOLUTION INTRAMUSCULAR; INTRAVENOUS
Status: DISCONTINUED | OUTPATIENT
Start: 2020-02-04 | End: 2020-02-04 | Stop reason: HOSPADM

## 2020-02-04 RX ORDER — MEPERIDINE HYDROCHLORIDE 50 MG/ML
12.5 INJECTION INTRAMUSCULAR; INTRAVENOUS; SUBCUTANEOUS EVERY 5 MIN PRN
Status: DISCONTINUED | OUTPATIENT
Start: 2020-02-04 | End: 2020-02-04 | Stop reason: HOSPADM

## 2020-02-04 RX ORDER — SODIUM CHLORIDE 450 MG/100ML
INJECTION, SOLUTION INTRAVENOUS CONTINUOUS
Status: DISCONTINUED | OUTPATIENT
Start: 2020-02-04 | End: 2020-02-04 | Stop reason: HOSPADM

## 2020-02-04 RX ORDER — ACETAMINOPHEN 325 MG/1
650 TABLET ORAL EVERY 4 HOURS PRN
Status: DISCONTINUED | OUTPATIENT
Start: 2020-02-04 | End: 2020-02-06 | Stop reason: HOSPADM

## 2020-02-04 RX ADMIN — FENTANYL CITRATE 100 MCG: 50 INJECTION INTRAMUSCULAR; INTRAVENOUS at 09:24

## 2020-02-04 RX ADMIN — ROPIVACAINE HYDROCHLORIDE 30 ML: 5 INJECTION, SOLUTION EPIDURAL; INFILTRATION; PERINEURAL at 09:14

## 2020-02-04 RX ADMIN — VANCOMYCIN HYDROCHLORIDE 1000 MG: 1 INJECTION, POWDER, LYOPHILIZED, FOR SOLUTION INTRAVENOUS at 08:32

## 2020-02-04 RX ADMIN — SODIUM CHLORIDE: 4.5 INJECTION, SOLUTION INTRAVENOUS at 08:33

## 2020-02-04 RX ADMIN — MIDAZOLAM 2 MG: 1 INJECTION INTRAMUSCULAR; INTRAVENOUS at 09:04

## 2020-02-04 RX ADMIN — HYDROMORPHONE HYDROCHLORIDE 0.25 MG: 1 INJECTION, SOLUTION INTRAMUSCULAR; INTRAVENOUS; SUBCUTANEOUS at 11:30

## 2020-02-04 RX ADMIN — SUCCINYLCHOLINE CHLORIDE 60 MG: 20 INJECTION, SOLUTION INTRAMUSCULAR; INTRAVENOUS at 09:24

## 2020-02-04 RX ADMIN — EPHEDRINE SULFATE 10 MG: 50 INJECTION INTRAMUSCULAR; INTRAVENOUS; SUBCUTANEOUS at 10:00

## 2020-02-04 RX ADMIN — ROCURONIUM BROMIDE 10 MG: 10 SOLUTION INTRAVENOUS at 09:24

## 2020-02-04 RX ADMIN — LIDOCAINE HYDROCHLORIDE 5 ML: 10 INJECTION, SOLUTION INFILTRATION; PERINEURAL at 09:24

## 2020-02-04 RX ADMIN — HEPARIN SODIUM 3000 UNITS: 1000 INJECTION, SOLUTION INTRAVENOUS; SUBCUTANEOUS at 09:54

## 2020-02-04 RX ADMIN — ONDANSETRON HYDROCHLORIDE 4 MG: 2 INJECTION, SOLUTION INTRAMUSCULAR; INTRAVENOUS at 09:24

## 2020-02-04 RX ADMIN — Medication 1000 MG: at 08:32

## 2020-02-04 RX ADMIN — PROPOFOL 150 MG: 10 INJECTION, EMULSION INTRAVENOUS at 09:24

## 2020-02-04 RX ADMIN — DEXAMETHASONE SODIUM PHOSPHATE 4 MG: 4 INJECTION, SOLUTION INTRAMUSCULAR; INTRAVENOUS at 09:24

## 2020-02-04 ASSESSMENT — PAIN DESCRIPTION - PAIN TYPE
TYPE: SURGICAL PAIN
TYPE: SURGICAL PAIN

## 2020-02-04 ASSESSMENT — PAIN DESCRIPTION - LOCATION: LOCATION: INCISION;CHEST

## 2020-02-04 ASSESSMENT — ENCOUNTER SYMPTOMS: SHORTNESS OF BREATH: 0

## 2020-02-04 ASSESSMENT — PAIN DESCRIPTION - ORIENTATION: ORIENTATION: RIGHT

## 2020-02-04 ASSESSMENT — PAIN DESCRIPTION - DESCRIPTORS: DESCRIPTORS: ACHING

## 2020-02-04 ASSESSMENT — PAIN SCALES - GENERAL
PAINLEVEL_OUTOF10: 3
PAINLEVEL_OUTOF10: 5
PAINLEVEL_OUTOF10: 3

## 2020-02-04 ASSESSMENT — LIFESTYLE VARIABLES: SMOKING_STATUS: 0

## 2020-02-04 NOTE — OP NOTE
Preoperative Diagnosis:  1. End-stage renal disease on hemodialysis  2. Failing left distal radial artery to cephalic vein arteriovenous fistula    Postoperative Diagnosis:  Same    Operative Procedure:   1. Left brachial artery to Cephalic Vein Arteriovenous Fistula Creation  2. Ligation of distal forearm cephalic vein near the radial cephalic arteriovenous anastomosis  3. Ultrasound-guided cannulation of right internal jugular vein and placement of right internal jugular vein tunneled dialysis catheter (glidepath 23 cm tip to cuff)    Surgeon:  Praneeth Ambrosio. Merrianne Nageotte, M.D. Anesthesia:  Left upper extremity block plus general    Estimated Blood Loss:  Less than 50 ml    Findings:  1. The artery has minimal plaque and is around 6 mm in diameter  2. The vein is around 4 mm in diameter  3. The right internal jugular vein is patent. 4.  The dialysis catheter tips are in the right atrium/superior vena cava junction. Procedure in detail:    After the patient was consented, a block performed by anesthesia, and IV antibiotics administered, he was brought to the operating room and placed on the operating room table supine position. His arm was then prepped and draped in the usual sterile procedure. A transverse incision was made in the proximal forearm over the brachial artery pulse with knife and subcutaneous tissues were dissected with bovie electrocautery. Sharp dissection was used to expose the cephalic and antecubital veins and the cephalic vein was marked for orientation purposes. The artery was then dissected proximally and distally and vesseloops placed for future vascular control. The patient was given intravenous heparin. After adequate heparinization time, the branches off of the cephalic vein are ligated and a bulldog clamp is placed proximally for vascular control. Two of the branches are then transected and connected with Wilson scissors to create a nice forrester for anastomosis.   The proximal and distal vesseloops were tightened and a longitudinal ateriotomy made with 11 blade and Wilson scissors. An end vein to side artery anastomosis is then created with 6-0 prolene running suture. Prior to completing the anastomosis, standard flushing techniques were utilized to remove any air and debris from the native vessels. Hemostasis was excellent and there is a nicely palpable thrill in the fistula. The wound is closed in layers with 3-0 PDS subcutaneous sutures, 4-0 monocryl subcuticular running suture and dermabond skin adhesive. The patient's bilateral neck and chest were prepped and draped in the usual sterile fashion. The right internal jugular vein was cannulated under ultrasound guidance with a micropuncture needle. Seldinger technique was utilized to place an 0.035 wire into the inferior vena cava under fluoroscopic visualization. An incision was made in the right neck over the wire with knife. A separate incision was made in the right chest with knife. The catheter was tunneled from the chest to the neck incision. Dilators were passed over the wire under fluoroscopic visualization. A dilator/tear-away sheath was placed over the wire under fluoroscopic visualization and the dilator and wire were removed. The catheter was placed through the tear-away sheath and down to the right atrium under fluoroscopic visualization. The tear-away sheath was removed and the catheter was withdrawn until the tips were in the superior vena cava/right atrial junction. The right neck wound was closed in layers with 3-0 vicryl subcutaneous, 3-0 nylon sutures and dermabond skin adhesive. The exit site was secured around the catheter with 3-0 vicryl subcutaneous purstring suture. The catheter itself was secured to the chest with two 2-0 nylon sutures. Sterile dressings were placed.   Both ports of the catheter aspirated and flushed easily with heparinized saline and heparin lock.    He tolerated the procedure well and was brought to the recovery room in good condition. The catheter is ready for use. We will see him back in the office in 2 to 3 weeks. Likely, we can begin using his fistula in 3 to 4 weeks.

## 2020-02-04 NOTE — OP NOTE
Preoperative Diagnosis:  1. End-stage renal disease on hemodialysis  2. Failing left distal radial artery to cephalic vein arteriovenous fistula    Postoperative Diagnosis:  Same    Operative Procedure:   1. Left brachial artery to Cephalic Vein Arteriovenous Fistula Creation  2. Ligation of distal forearm cephalic vein near the radial cephalic arteriovenous anastomosis  3. Ultrasound-guided cannulation of right internal jugular vein and placement of right internal jugular vein tunneled dialysis catheter (glidepath 23 cm tip to cuff)    Surgeon:  Ayush Duenas M.D. Anesthesia:  Left upper extremity block plus general    Estimated Blood Loss:  Less than 50 ml    Findings:  1. The artery has minimal plaque and is around 6 mm in diameter  2. The vein is around 4 mm in diameter  3. The right internal jugular vein is patent. 4.  The dialysis catheter tips are in the right atrium/superior vena cava junction. Procedure in detail:    After the patient was consented, a block performed by anesthesia, and IV antibiotics administered, he was brought to the operating room and placed on the operating room table supine position. His arm was then prepped and draped in the usual sterile procedure. A transverse incision was made in the proximal forearm over the brachial artery pulse with knife and subcutaneous tissues were dissected with bovie electrocautery. Sharp dissection was used to expose the cephalic and antecubital veins and the cephalic vein was marked for orientation purposes. The artery was then dissected proximally and distally and vesseloops placed for future vascular control. The patient was given intravenous heparin. After adequate heparinization time, the branches off of the cephalic vein are ligated and a bulldog clamp is placed proximally for vascular control. Two of the branches are then transected and connected with Wilson scissors to create a nice forrester for anastomosis.   The proximal and distal vesseloops were tightened and a longitudinal ateriotomy made with 11 blade and Wilson scissors. An end vein to side artery anastomosis is then created with 6-0 prolene running suture. Prior to completing the anastomosis, standard flushing techniques were utilized to remove any air and debris from the native vessels. Hemostasis was excellent and there is a nicely palpable thrill in the fistula. The wound is closed in layers with 3-0 PDS subcutaneous sutures, 4-0 monocryl subcuticular running suture and dermabond skin adhesive. The patient's bilateral neck and chest were prepped and draped in the usual sterile fashion. The right internal jugular vein was cannulated under ultrasound guidance with a micropuncture needle. Seldinger technique was utilized to place an 0.035 wire into the inferior vena cava under fluoroscopic visualization. An incision was made in the right neck over the wire with knife. A separate incision was made in the right chest with knife. The catheter was tunneled from the chest to the neck incision. Dilators were passed over the wire under fluoroscopic visualization. A dilator/tear-away sheath was placed over the wire under fluoroscopic visualization and the dilator and wire were removed. The catheter was placed through the tear-away sheath and down to the right atrium under fluoroscopic visualization. The tear-away sheath was removed and the catheter was withdrawn until the tips were in the superior vena cava/right atrial junction. The right neck wound was closed in layers with 3-0 vicryl subcutaneous, 3-0 nylon sutures and dermabond skin adhesive. The exit site was secured around the catheter with 3-0 vicryl subcutaneous purstring suture. The catheter itself was secured to the chest with two 2-0 nylon sutures. Sterile dressings were placed.   Both ports of the catheter aspirated and flushed easily with heparinized saline and heparin lock.    He tolerated the procedure well and was brought to the recovery room in good condition. The catheter is ready for use. We will see him back in the office in 2 to 3 weeks. Likely, we can begin using his fistula in 3 to 4 weeks.

## 2020-02-04 NOTE — ANESTHESIA PRE PROCEDURE
(VANCOCIN) 1 g in dextrose 5% 250 mL IVPB  1,000 mg Intravenous On Call to Michelle Garza PA-C        aspirin EC tablet 81 mg  [facility]           Allergies:  No Known Allergies    Problem List:    Patient Active Problem List   Diagnosis Code    Dialysis AV fistula malfunction, initial encounter ([facility] T82.590A    CKD (chronic kidney disease) stage V requiring chronic dialysis ([facility] N18.6, Z99.2       Past Medical History:        Diagnosis Date    Arthritis     Chronic kidney disease     CKD (chronic kidney disease) stage V requiring chronic dialysis ([facility] 12/19/2019    Diabetes mellitus ([facility]     Diabetic retinopathy ([facility]     Digestive disorder     \" slow digestion \"    Hemodialysis patient ([facility]     Hyperlipemia     Hypertension     Legally blind     Neuropathy     feet       Past Surgical History:        Procedure Laterality Date    CATARACT REMOVAL      EYE SURGERY      OR ANASTOMOSIS,AV,ANY SITE Left 7/2/2018    ARM RADICAL/ CEPHALIC AV FISTULA performed by Leroy Rodriguez MD at [address]  12/13/2018    S[facility]. Left upper fistulograms, BA distal forearm cephalic vein 1Y47 cutter,coil branch (5x3(2)    VASCULAR SURGERY  03/28/2019    [facility]. Left upper fistulograms/venograms. Social History:    Social History     Tobacco Use    Smoking status: Never Smoker    Smokeless tobacco: Never Used   Substance Use Topics    Alcohol use:  No                                Counseling given: Not Answered      Vital Signs (Current):   Vitals:    02/04/20 0816   BP: 139/76   Pulse: 89   Resp: 16   Temp: 97.4 °F (36.3 °C)   TempSrc: Temporal   SpO2: 95%   Weight: 185 lb (83.9 kg)   Height: 5' 11\" (1.803 m)                                              BP Readings from Last 3 Encounters:   02/04/20 139/76   12/19/19 125/68   03/28/19 137/71       NPO Status:                                                                                 BMI:   Wt Readings from and dialysis,      (-) hiatal hernia and GERD       Endo/Other: Negative Endo/Other ROS   (+) DiabetesType II DM, , : arthritis: OA., . Pt had PAT visit. Abdominal:       Abdomen: soft. Vascular:   + PVD, aortic or cerebral, . Anesthesia Plan      general and regional     ASA 4     (Labs pending )  Induction: intravenous. BIS, arterial line, PA catheter and CUCA    Anesthetic plan and risks discussed with patient. Use of blood products discussed with patient whom. Plan discussed with CRNA.     Attending anesthesiologist reviewed and agrees with Pre Eval content              Yamilka Dale MD   2/4/2020

## 2020-02-04 NOTE — ANESTHESIA PROCEDURE NOTES
Peripheral Block    Patient location during procedure: holding area  Start time: 2/4/2020 9:14 AM  End time: 2/4/2020 9:14 AM  Staffing  Anesthesiologist: Racquel Gamboa MD  Performed: anesthesiologist   Preanesthetic Checklist  Completed: patient identified, site marked, surgical consent, pre-op evaluation, timeout performed, IV checked, risks and benefits discussed, monitors and equipment checked, anesthesia consent given, oxygen available and patient being monitored  Peripheral Block  Patient position: supine  Prep: ChloraPrep  Patient monitoring: cardiac monitor, continuous pulse ox, frequent blood pressure checks and IV access  Block type: Brachial plexus  Laterality: left  Injection technique: single-shot  Procedures: ultrasound guided  Infraclavicular  Provider prep: mask and sterile gloves  Needle  Needle type: short-bevel   Needle gauge: 20 G  Needle length: 10 cm  Needle localization: anatomical landmarks and ultrasound guidance  Test dose: negative  Assessment  Injection assessment: negative aspiration for heme, no paresthesia on injection and local visualized surrounding nerve on ultrasound  Slow fractionated injection: yes  Hemodynamics: stable  Additional Notes  A inplane technique was used to image this block. The needle was introduced cranial-caudad direction at a 45-60degree angle, in the anterior axillary line (or approximately the lateral third of the clavicle). The needle tip was visualized during advancement below the clavicle  and all obvious vascular structures were avoided. Half the LA dose was deposited anterior to the axillary artery and remainder was directly posteriorly. Spread of Local Anesthetic was noted around the cords of the brachial plexus. Care was given to avoid the pleura.     Reason for block: post-op pain management

## 2020-02-17 ENCOUNTER — OFFICE VISIT (OUTPATIENT)
Dept: VASCULAR SURGERY | Age: 65
End: 2020-02-17

## 2020-02-17 ENCOUNTER — TELEPHONE (OUTPATIENT)
Dept: VASCULAR SURGERY | Age: 65
End: 2020-02-17

## 2020-02-17 VITALS
TEMPERATURE: 98.3 F | BODY MASS INDEX: 25.9 KG/M2 | WEIGHT: 185 LBS | OXYGEN SATURATION: 97 % | HEART RATE: 87 BPM | SYSTOLIC BLOOD PRESSURE: 111 MMHG | DIASTOLIC BLOOD PRESSURE: 59 MMHG | HEIGHT: 71 IN | RESPIRATION RATE: 16 BRPM

## 2020-02-17 PROCEDURE — 99024 POSTOP FOLLOW-UP VISIT: CPT | Performed by: NURSE PRACTITIONER

## 2020-02-17 NOTE — TELEPHONE ENCOUNTER
Spoke with Melanie at [facility] in [place]. Informed them that they can use fistula two weeks from today Per [employer] [facility].

## 2020-03-25 ENCOUNTER — TELEPHONE (OUTPATIENT)
Dept: VASCULAR SURGERY | Age: 65
End: 2020-03-25

## 2020-03-25 NOTE — TELEPHONE ENCOUNTER
I spoke with Melisa Hernandez', they want Yasmani's Perm-cath removed due to Fistula working without problems. I went over they day and time and made them aware we are doing this procedure in the office. (Please see letter for instructions) This is sched.  For Thurs 4/2/2020 with [facility] at 2:00pm

## 2020-04-02 ENCOUNTER — PROCEDURE VISIT (OUTPATIENT)
Dept: VASCULAR SURGERY | Age: 65
End: 2020-04-02
Payer: MEDICARE

## 2020-04-02 VITALS
TEMPERATURE: 98.7 F | SYSTOLIC BLOOD PRESSURE: 142 MMHG | HEART RATE: 67 BPM | DIASTOLIC BLOOD PRESSURE: 71 MMHG | OXYGEN SATURATION: 99 % | HEIGHT: 72 IN | RESPIRATION RATE: 18 BRPM | BODY MASS INDEX: 25.06 KG/M2 | WEIGHT: 185 LBS

## 2020-04-02 PROCEDURE — 36589 REMOVAL TUNNELED CV CATH: CPT | Performed by: SURGERY

## 2021-10-18 ENCOUNTER — TELEPHONE (OUTPATIENT)
Dept: VASCULAR SURGERY | Age: 66
End: 2021-10-18

## 2021-10-22 ENCOUNTER — TELEPHONE (OUTPATIENT)
Dept: VASCULAR SURGERY | Age: 66
End: 2021-10-22

## 2021-10-22 NOTE — TELEPHONE ENCOUNTER
Karina Galaviz, the pt's daughter who is listed on [employer]IP[employer], to ask if the pt would be able to arrive at [address] [place] [address] Rd[address] on [address] 10/28/2021 for his procedure with Dr. Saumya Schmid. Fransisco Hernandez was agreeable to this change. We then discussed how the pt has d/c Losartan, and how he will need to take a half dose of insulin the night prior to his procedure and not to take any the morning of. Wash the arm with Hibiclens soap the morning of. The pt will need a  to take him home. Fransisco Hernandez voiced understanding and is aware.

## 2021-10-27 ENCOUNTER — TELEPHONE (OUTPATIENT)
Dept: VASCULAR SURGERY | Age: 66
End: 2021-10-27

## 2021-10-27 ENCOUNTER — VIRTUAL VISIT (OUTPATIENT)
Dept: VASCULAR SURGERY | Age: 66
End: 2021-10-27
Payer: MEDICARE

## 2021-10-27 DIAGNOSIS — N18.6 ESRD (END STAGE RENAL DISEASE) (HCC): Primary | ICD-10-CM

## 2021-10-27 PROCEDURE — 99442 PR PHYS/QHP TELEPHONE EVALUATION 11-20 MIN: CPT | Performed by: NURSE PRACTITIONER

## 2021-10-27 RX ORDER — SODIUM CHLORIDE 0.9 % (FLUSH) 0.9 %
10 SYRINGE (ML) INJECTION PRN
Status: CANCELLED | OUTPATIENT
Start: 2021-10-27

## 2021-10-27 RX ORDER — CLONIDINE HYDROCHLORIDE 0.1 MG/1
0.1 TABLET ORAL PRN
Status: CANCELLED | OUTPATIENT
Start: 2021-10-27

## 2021-10-27 RX ORDER — MULTIVIT-MIN/FOLIC/VIT K/LYCOP 400-300MCG
1 TABLET ORAL DAILY
Status: ON HOLD | COMMUNITY
End: 2022-09-19

## 2021-10-27 RX ORDER — SODIUM CHLORIDE 9 MG/ML
INJECTION, SOLUTION INTRAVENOUS CONTINUOUS
Status: CANCELLED | OUTPATIENT
Start: 2021-10-27

## 2021-10-27 RX ORDER — TRAZODONE HYDROCHLORIDE 50 MG/1
50 TABLET ORAL NIGHTLY
COMMUNITY
End: 2021-10-28

## 2021-10-27 RX ORDER — SODIUM CHLORIDE 9 MG/ML
25 INJECTION, SOLUTION INTRAVENOUS PRN
Status: CANCELLED | OUTPATIENT
Start: 2021-10-27

## 2021-10-27 RX ORDER — ERGOCALCIFEROL (VITAMIN D2) 1250 MCG
50000 CAPSULE ORAL
COMMUNITY
End: 2021-11-30

## 2021-10-27 RX ORDER — SEVELAMER CARBONATE 800 MG/1
1 TABLET, FILM COATED ORAL
COMMUNITY

## 2021-10-27 RX ORDER — ASPIRIN 81 MG/1
81 TABLET ORAL ONCE
Status: CANCELLED | OUTPATIENT
Start: 2021-10-27 | End: 2021-10-27

## 2021-10-27 RX ORDER — MIDODRINE HYDROCHLORIDE 5 MG/1
10 TABLET ORAL PRN
COMMUNITY

## 2021-10-27 NOTE — TELEPHONE ENCOUNTER
Left a  for Heidi Lynch asking if he could do a telephone visit this afternoon with Rashida Adame prior to his procedure scheduled tomorrow 10/28/21. I asked him to call our office back to let us know if he can and we can get this scheduled.

## 2021-10-27 NOTE — PROGRESS NOTES
Chad Donohue is a 77 y.o. male evaluated via telephone on 10/27/2021. Chad Donohue (:  1955) is a 77 y.o. male,Established patient, here for evaluation of the following chief complaint(s):     Consent:  He and/or health care decision maker is aware that that he may receive a bill for this telephone service, depending on his insurance coverage, and has provided verbal consent to proceed: Yes    Patient is located at home  Provider is located at [facility]   Also present during call is no one    Patient has a history of ESRD. He dialzyes on a fistula. He was diagnosed with respiratory failure due to Covid 19. He was transferred to [facility]. He had severe post procedure bleeding. A permacath was placed in [facility]. This was about 6 weeks ago.       Chad Donohue is a 77 y.o. male with the following history reviewed and recorded in [employer]:  Patient Active Problem List    Diagnosis Date Noted    CKD (chronic kidney disease) stage V requiring chronic dialysis ([facility] 2019    Dialysis AV fistula malfunction, subsequent encounter 2018     Current Outpatient Medications   Medication Sig Dispense Refill    ergocalciferol (ERGOCALCIFEROL) 1.25 MG (24227 UT) capsule Take 50,000 Units by mouth every 14 days      midodrine (PROAMATINE) 5 MG tablet Take 5 mg by mouth 3 times daily      Multiple Vitamins-Minerals (PRESERVISION AREDS 2 PO) Take by mouth daily      sevelamer (RENVELA) 800 MG tablet Take 1 tablet by mouth 3 times daily (with meals)      traZODone (DESYREL) 50 MG tablet Take 50 mg by mouth nightly      Multiple Vitamins-Minerals (VISION FORMULA 2) CAPS multivitamin Take 1 capsule by mouth daily      Ergocalciferol (VITAMIN D2 PO) Take 1,250 mcg by mouth once a week      losartan (COZAAR) 25 MG tablet Take 25 mg by mouth three times a week at night before dialysis (Patient not taking: Reported on 10/27/2021)      insulin aspart protamine-insulin aspart (NOVOLOG MIX 70/30 FLEXPEN) (70-30) 100 UNIT/ML injection Inject 42 Units into the skin 2 times daily (with meals)      melatonin 3 MG TABS tablet Take 5 mg by mouth nightly as needed       acetaminophen (TYLENOL) 500 MG tablet Take 500 mg by mouth every 6 hours as needed for Pain      fenofibrate 160 MG tablet Take 160 mg by mouth daily      indapamide (LOZOL) 2.5 MG tablet Take 1 tablet by mouth daily (Patient not taking: Reported on 10/27/2021)      metoclopramide (REGLAN) 5 MG tablet Take 5 mg by mouth 4 times daily      simvastatin (ZOCOR) 80 MG tablet Take 80 mg by mouth daily      temazepam (RESTORIL) 15 MG capsule Take 15 mg by mouth nightly. . (Patient not taking: Reported on 10/27/2021)      aspirin 81 MG tablet Take 81 mg by mouth daily       No current facility-administered medications for this visit. Allergies: Patient has no known allergies. Past Medical History:   Diagnosis Date    Arthritis     Chronic kidney disease     CKD (chronic kidney disease) stage V requiring chronic dialysis ([facility] 12/19/2019    Diabetes mellitus ([facility]     Diabetic retinopathy ([facility]     Digestive disorder     \" slow digestion \"    Hemodialysis patient ([facility]     Hyperlipemia     Hypertension     Legally blind     Neuropathy     feet     Past Surgical History:   Procedure Laterality Date    CATARACT REMOVAL      DIALYSIS FISTULA CREATION Left 2/4/2020    LIGATION OF LEFT RADIAL ARTERY TO LEFT CEPHALIC VEIN HEMODIALYSIS FISTULA; CREATION OF LEFT BRACHIAL ARTERY TO LEFT CEPHALIC VEIN PRIMARY ARTERIO-VENOUS FISTULA performed by Gilberto Lara MD at [address] Right 2/4/2020    INSERTION OF RIGHT INTERNAL JUGULAR HEMODIALYSIS CATHETER performed by Gilberto aLra MD at [facility] [address] [place] Left 7/2/2018    ARM RADICAL/ CEPHALIC AV FISTULA performed by Janice Acevedo MD at [address]  12/13/2018    [facility] [place]. Left upper fistulograms, BA distal forearm [x] No apparent distress  [] Toxic appearing  [x] Normal Mood  [] Anxious appearing    [] Depressed appearing  [] Confused appearing      [] Poor short term memory  [] Poor long term memory   Memory appears to be intact       Options were discussed with the patient including continued medical management versus proceeding with fistulogram and possible angioplasty/stent. Patient has opted to proceed with this. Risks have been discussed with the patient including but not limited to MI, death, CVA, bleed, nerve injury, infection, contrast nephropathy, possible need for dialysis, and need for further surgery. Assessment    1. ESRD (end stage renal disease) ([facility]          Plan    1. ESRD (end stage renal disease) ([facility]        fistulogram with possible angioplasty or stent    Documentation:  I communicated with the patient and/or health care decision maker about esrd. Details of this discussion including any medical advice provided: as above      I affirm this is a Patient Initiated Episode with a Patient who has not had a related appointment within my department in the past 7 days or scheduled within the next 24 hours. Patient identification was verified at the start of the visit: Yes    Total Time: minutes: 11-20 minutes    The visit was conducted pursuant to the emergency declaration under the [facility], [address] [place] [address] authority and the [employer] Act. Patient identification was verified, and a caregiver was present when appropriate. The patient was located in a state where the provider was credentialed to provide care.     Note: not billable if this call serves to triage the patient into an appointment for the relevant concern      CALIXTO Hummel

## 2021-10-27 NOTE — H&P (VIEW-ONLY)
Hoa Rene is a 77 y.o. male evaluated via telephone on 10/27/2021. Hoa Rene (:  1955) is a 77 y.o. male,Established patient, here for evaluation of the following chief complaint(s):         Patient has a history of ESRD. He dialzyes on a fistula. He was diagnosed with respiratory failure due to Covid 19. He was transferred to [facility]. He had severe post procedure bleeding. A permacath was placed in [facility]. This was about 6 weeks ago.       Hoa Rene is a 77 y.o. male with the following history reviewed and recorded in [facility]:  Patient Active Problem List    Diagnosis Date Noted    CKD (chronic kidney disease) stage V requiring chronic dialysis ([facility] [address] 2019    Dialysis AV fistula malfunction, subsequent encounter 2018     Current Outpatient Medications   Medication Sig Dispense Refill    ergocalciferol (ERGOCALCIFEROL) 1.25 MG (25217 UT) capsule Take 50,000 Units by mouth every 14 days      midodrine (PROAMATINE) 5 MG tablet Take 5 mg by mouth 3 times daily      Multiple Vitamins-Minerals (PRESERVISION AREDS 2 PO) Take by mouth daily      sevelamer (RENVELA) 800 MG tablet Take 1 tablet by mouth 3 times daily (with meals)      traZODone (DESYREL) 50 MG tablet Take 50 mg by mouth nightly      Multiple Vitamins-Minerals (VISION FORMULA 2) CAPS multivitamin Take 1 capsule by mouth daily      Ergocalciferol (VITAMIN D2 PO) Take 1,250 mcg by mouth once a week      losartan (COZAAR) 25 MG tablet Take 25 mg by mouth three times a week at night before dialysis (Patient not taking: Reported on 10/27/2021)      insulin aspart protamine-insulin aspart (NOVOLOG MIX 70/30 FLEXPEN) (70-30) 100 UNIT/ML injection Inject 42 Units into the skin 2 times daily (with meals)      melatonin 3 MG TABS tablet Take 5 mg by mouth nightly as needed       acetaminophen (TYLENOL) 500 MG tablet Take 500 mg by mouth every 6 hours as needed for Pain      fenofibrate 160 MG tablet Take 160 mg by mouth daily      indapamide (LOZOL) 2.5 MG tablet Take 1 tablet by mouth daily (Patient not taking: Reported on 10/27/2021)      metoclopramide (REGLAN) 5 MG tablet Take 5 mg by mouth 4 times daily      simvastatin (ZOCOR) 80 MG tablet Take 80 mg by mouth daily      temazepam (RESTORIL) 15 MG capsule Take 15 mg by mouth nightly. . (Patient not taking: Reported on 10/27/2021)      aspirin 81 MG tablet Take 81 mg by mouth daily       No current facility-administered medications for this visit. Allergies: Patient has no known allergies. Past Medical History:   Diagnosis Date    Arthritis     Chronic kidney disease     CKD (chronic kidney disease) stage V requiring chronic dialysis ([facility] [address] 12/19/2019    Diabetes mellitus ([facility] [address]     Diabetic retinopathy ([facility] [address]     Digestive disorder     \" slow digestion \"    Hemodialysis patient ([facility] [address]     Hyperlipemia     Hypertension     Legally blind     Neuropathy     feet     Past Surgical History:   Procedure Laterality Date    CATARACT REMOVAL      DIALYSIS FISTULA CREATION Left 2/4/2020    LIGATION OF LEFT RADIAL ARTERY TO LEFT CEPHALIC VEIN HEMODIALYSIS FISTULA; CREATION OF LEFT BRACHIAL ARTERY TO LEFT CEPHALIC VEIN PRIMARY ARTERIO-VENOUS FISTULA performed by Gilberto Lara MD at . [address] Right 2/4/2020    INSERTION OF RIGHT INTERNAL JUGULAR HEMODIALYSIS CATHETER performed by Gilberto Lara MD at [address] [place] [address] Dr Apodaca Left 7/2/2018    ARM RADICAL/ CEPHALIC AV FISTULA performed by Janice Acevedo MD at [address] [place] [address]  12/13/2018    S. Left upper fistulograms, BA distal forearm cephalic vein [id number] cutter,coil branch (5x3(2)    VASCULAR SURGERY  03/28/2019    SJS. Left upper fistulograms/venograms.     VASCULAR SURGERY  02/04/2020    S Left brachial artery to Cephalic Vein Arteriovenous Fistula Creation, Ligation of distal forearm cephallic vein near the radial cephallic arteriovenous anastomosis, Ultrasound-guided cannulation of right internal jugular vein a placement of right internal jugular vein tunneled dialysis catheter (glidepath 23 cm tip to cuff)    VASCULAR SURGERY  02/04/2020    SJS  Left brachial artery Cephalic Vein Arteriovenous Fistula Creation, Ligation of distal forearm cephalic vein near the radial cephalic arteriovenous anastomosis, Ultrasound-guided cannulation of right internal juhular vein and placement of right internal jugular vein tunneled dialysis catheter (glidepath 23 cm tip to cuff)     VASCULAR SURGERY  04/02/2020    SJS removal of tunneled dialysis catheter right internal jugular vein     Family History   Problem Relation Age of Onset    Cancer Mother     Other Mother         COPD    Other Father         tractor accident     Social History     Tobacco Use    Smoking status: Never Smoker    Smokeless tobacco: Never Used   Substance Use Topics    Alcohol use: No       No flowsheet data found. Review of Systems      Eyes  no sudden vision change or amaurosis. Respiratory  no significant shortness of breath, wheezing, or stridor. No cough,  Cardiovascular  no chest pain, syncope, or significant dizziness. No significant leg swelling.  has not had claudication. Skin   has not had new wound. Neurologic   No speech difficulty or lateralizing weakness. Psychiatric  no severe anxiety or nervousness. No confusion. All other review of systems are negative.     PHYSICAL EXAMINATION:    Pulsatile left upper arm av fistula, brachial cephalic    [x] Alert  [x] Oriented to person/place/time    [x] No apparent distress  [] Toxic appearing  [x] Normal Mood  [] Anxious appearing    [] Depressed appearing  [] Confused appearing      [] Poor short term memory  [] Poor long term memory   Memory appears to be intact       Options were discussed with the patient including continued medical management versus proceeding with fistulogram and possible angioplasty/stent. Patient has opted to proceed with this. Risks have been discussed with the patient including but not limited to MI, death, CVA, bleed, nerve injury, infection, contrast nephropathy, possible need for dialysis, and need for further surgery. Assessment    1. ESRD (end stage renal disease) ([facility] [address]      2. Left upper extremity av fistula malfunction with increased bleeding    Plan    1.  ESRD (end stage renal disease) ([address]        Left upper extremity av fistulogram with possible angioplasty or stent

## 2021-10-27 NOTE — H&P
Jose Carlos Rodriguez is a 77 y.o. male evaluated via telephone on 10/27/2021. Jose Carlos Rodriguez (:  1955) is a 77 y.o. male,Established patient, here for evaluation of the following chief complaint(s):         Patient has a history of ESRD. He dialzyes on a fistula. He was diagnosed with respiratory failure due to Covid 19. He was transferred to [facility]. He had severe post procedure bleeding. A permacath was placed in [facility]. This was about 6 weeks ago.       Jose Carlos Rodriguez is a 77 y.o. male with the following history reviewed and recorded in [facility]:  Patient Active Problem List    Diagnosis Date Noted    CKD (chronic kidney disease) stage V requiring chronic dialysis ([facility] [address] 2019    Dialysis AV fistula malfunction, subsequent encounter 2018     Current Outpatient Medications   Medication Sig Dispense Refill    ergocalciferol (ERGOCALCIFEROL) 1.25 MG (65780 UT) capsule Take 50,000 Units by mouth every 14 days      midodrine (PROAMATINE) 5 MG tablet Take 5 mg by mouth 3 times daily      Multiple Vitamins-Minerals (PRESERVISION AREDS 2 PO) Take by mouth daily      sevelamer (RENVELA) 800 MG tablet Take 1 tablet by mouth 3 times daily (with meals)      traZODone (DESYREL) 50 MG tablet Take 50 mg by mouth nightly      Multiple Vitamins-Minerals (VISION FORMULA 2) CAPS multivitamin Take 1 capsule by mouth daily      Ergocalciferol (VITAMIN D2 PO) Take 1,250 mcg by mouth once a week      losartan (COZAAR) 25 MG tablet Take 25 mg by mouth three times a week at night before dialysis (Patient not taking: Reported on 10/27/2021)      insulin aspart protamine-insulin aspart (NOVOLOG MIX 70/30 FLEXPEN) (70-30) 100 UNIT/ML injection Inject 42 Units into the skin 2 times daily (with meals)      melatonin 3 MG TABS tablet Take 5 mg by mouth nightly as needed       acetaminophen (TYLENOL) 500 MG tablet Take 500 mg by mouth every 6 hours as needed for Pain      fenofibrate 160 MG tablet Take 160 mg by mouth daily      indapamide (LOZOL) 2.5 MG tablet Take 1 tablet by mouth daily (Patient not taking: Reported on 10/27/2021)      metoclopramide (REGLAN) 5 MG tablet Take 5 mg by mouth 4 times daily      simvastatin (ZOCOR) 80 MG tablet Take 80 mg by mouth daily      temazepam (RESTORIL) 15 MG capsule Take 15 mg by mouth nightly. . (Patient not taking: Reported on 10/27/2021)      aspirin 81 MG tablet Take 81 mg by mouth daily       No current facility-administered medications for this visit. Allergies: Patient has no known allergies. Past Medical History:   Diagnosis Date    Arthritis     Chronic kidney disease     CKD (chronic kidney disease) stage V requiring chronic dialysis ([facility] [address] 12/19/2019    Diabetes mellitus ([facility] [address]     Diabetic retinopathy ([facility] [address]     Digestive disorder     \" slow digestion \"    Hemodialysis patient ([facility] [address]     Hyperlipemia     Hypertension     Legally blind     Neuropathy     feet     Past Surgical History:   Procedure Laterality Date    CATARACT REMOVAL      DIALYSIS FISTULA CREATION Left 2/4/2020    LIGATION OF LEFT RADIAL ARTERY TO LEFT CEPHALIC VEIN HEMODIALYSIS FISTULA; CREATION OF LEFT BRACHIAL ARTERY TO LEFT CEPHALIC VEIN PRIMARY ARTERIO-VENOUS FISTULA [facility] 2/4/2020    INSERTION OF RIGHT INTERNAL JUGULAR HEMODIALYSIS CATHETER performed by Kim Gutierrez MD at [address] [place] [address] Dr Apodaca Left 7/2/2018    ARM RADICAL/ CEPHALIC AV FISTULA performed by Tom Webber MD at [address] [place] [address]  12/13/2018    S. Left upper fistulograms, BA distal forearm cephalic vein [id number] cutter,coil branch (5x3(2)    VASCULAR SURGERY  03/28/2019    SJS. Left upper fistulograms/venograms.     VASCULAR SURGERY  02/04/2020    S Left brachial artery to Cephalic Vein Arteriovenous Fistula Creation, Ligation of distal forearm cephallic vein near the radial cephallic arteriovenous anastomosis, Ultrasound-guided cannulation of right internal jugular vein a placement of right internal jugular vein tunneled dialysis catheter (glidepath 23 cm tip to cuff)    VASCULAR SURGERY  02/04/2020    SJS  Left brachial artery Cephalic Vein Arteriovenous Fistula Creation, Ligation of distal forearm cephalic vein near the radial cephalic arteriovenous anastomosis, Ultrasound-guided cannulation of right internal juhular vein and placement of right internal jugular vein tunneled dialysis catheter (glidepath 23 cm tip to cuff)     VASCULAR SURGERY  04/02/2020    SJS removal of tunneled dialysis catheter right internal jugular vein     Family History   Problem Relation Age of Onset    Cancer Mother     Other Mother         COPD    Other Father         tractor accident     Social History     Tobacco Use    Smoking status: Never Smoker    Smokeless tobacco: Never Used   Substance Use Topics    Alcohol use: No       No flowsheet data found. Review of Systems      Eyes - no sudden vision change or amaurosis. Respiratory - no significant shortness of breath, wheezing, or stridor. No cough,  Cardiovascular - no chest pain, syncope, or significant dizziness. No significant leg swelling.  has not had claudication. Skin -  has not had new wound. Neurologic -  No speech difficulty or lateralizing weakness. Psychiatric - no severe anxiety or nervousness. No confusion. All other review of systems are negative.     PHYSICAL EXAMINATION:    Pulsatile left upper arm av fistula, brachial cephalic    [x] Alert  [x] Oriented to person/place/time    [x] No apparent distress  [] Toxic appearing  [x] Normal Mood  [] Anxious appearing    [] Depressed appearing  [] Confused appearing      [] Poor short term memory  [] Poor long term memory   Memory appears to be intact       Options were discussed with the patient including continued medical management versus proceeding with fistulogram and possible angioplasty/stent. Patient has opted to proceed with this. Risks have been discussed with the patient including but not limited to MI, death, CVA, bleed, nerve injury, infection, contrast nephropathy, possible need for dialysis, and need for further surgery. Assessment    1. ESRD (end stage renal disease) ([facility] [address]      2. Left upper extremity av fistula malfunction with increased bleeding    Plan    1.  ESRD (end stage renal disease) ([address]        Left upper extremity av fistulogram with possible angioplasty or stent

## 2021-10-28 ENCOUNTER — HOSPITAL ENCOUNTER (OUTPATIENT)
Dept: INTERVENTIONAL RADIOLOGY/VASCULAR | Age: 66
Discharge: HOME | End: 2021-10-28
Payer: MEDICARE

## 2021-10-28 VITALS
SYSTOLIC BLOOD PRESSURE: 127 MMHG | OXYGEN SATURATION: 93 % | RESPIRATION RATE: 17 BRPM | DIASTOLIC BLOOD PRESSURE: 62 MMHG | TEMPERATURE: 96.6 F | HEART RATE: 90 BPM

## 2021-10-28 DIAGNOSIS — Z99.2 ESRD ON DIALYSIS (HCC): ICD-10-CM

## 2021-10-28 DIAGNOSIS — Z79.4 CONTROLLED TYPE 2 DIABETES MELLITUS WITH DIABETIC NEPHROPATHY, WITH LONG-TERM CURRENT USE OF INSULIN (HCC): ICD-10-CM

## 2021-10-28 DIAGNOSIS — E11.21 CONTROLLED TYPE 2 DIABETES MELLITUS WITH DIABETIC NEPHROPATHY, WITH LONG-TERM CURRENT USE OF INSULIN (HCC): ICD-10-CM

## 2021-10-28 DIAGNOSIS — I10 BENIGN ESSENTIAL HTN: ICD-10-CM

## 2021-10-28 DIAGNOSIS — N18.6 ESRD ON DIALYSIS (HCC): ICD-10-CM

## 2021-10-28 DIAGNOSIS — E78.2 MIXED HYPERLIPIDEMIA: ICD-10-CM

## 2021-10-28 PROBLEM — E11.29 DM (DIABETES MELLITUS) TYPE II CONTROLLED WITH RENAL MANIFESTATION (HCC): Status: ACTIVE | Noted: 2021-10-28

## 2021-10-28 PROBLEM — E78.5 HYPERLIPIDEMIA: Status: ACTIVE | Noted: 2021-10-28

## 2021-10-28 PROCEDURE — 2500000003 HC RX 250 WO HCPCS: Performed by: SURGERY

## 2021-10-28 PROCEDURE — 6360000002 HC RX W HCPCS: Performed by: NURSE PRACTITIONER

## 2021-10-28 PROCEDURE — 36903 INTRO CATH DIALYSIS CIRCUIT: CPT | Performed by: SURGERY

## 2021-10-28 PROCEDURE — 99153 MOD SED SAME PHYS/QHP EA: CPT | Performed by: SURGERY

## 2021-10-28 PROCEDURE — C1894 INTRO/SHEATH, NON-LASER: HCPCS

## 2021-10-28 PROCEDURE — 6360000002 HC RX W HCPCS: Performed by: SURGERY

## 2021-10-28 PROCEDURE — 99152 MOD SED SAME PHYS/QHP 5/>YRS: CPT | Performed by: SURGERY

## 2021-10-28 PROCEDURE — 6360000004 HC RX CONTRAST MEDICATION: Performed by: SURGERY

## 2021-10-28 PROCEDURE — 2580000003 HC RX 258: Performed by: NURSE PRACTITIONER

## 2021-10-28 RX ORDER — CLONIDINE HYDROCHLORIDE 0.1 MG/1
0.1 TABLET ORAL PRN
Status: DISCONTINUED | OUTPATIENT
Start: 2021-10-28 | End: 2021-10-30 | Stop reason: HOSPADM

## 2021-10-28 RX ORDER — MIDAZOLAM HYDROCHLORIDE 1 MG/ML
INJECTION INTRAMUSCULAR; INTRAVENOUS
Status: COMPLETED | OUTPATIENT
Start: 2021-10-28 | End: 2021-10-28

## 2021-10-28 RX ORDER — SODIUM CHLORIDE 9 MG/ML
INJECTION, SOLUTION INTRAVENOUS CONTINUOUS
Status: DISCONTINUED | OUTPATIENT
Start: 2021-10-28 | End: 2021-10-30 | Stop reason: HOSPADM

## 2021-10-28 RX ORDER — LIDOCAINE HYDROCHLORIDE 20 MG/ML
INJECTION, SOLUTION INFILTRATION; PERINEURAL
Status: COMPLETED | OUTPATIENT
Start: 2021-10-28 | End: 2021-10-28

## 2021-10-28 RX ORDER — HYDROCODONE BITARTRATE AND ACETAMINOPHEN 5; 325 MG/1; MG/1
1 TABLET ORAL EVERY 4 HOURS PRN
Status: DISCONTINUED | OUTPATIENT
Start: 2021-10-28 | End: 2021-10-30 | Stop reason: HOSPADM

## 2021-10-28 RX ORDER — ONDANSETRON 2 MG/ML
4 INJECTION INTRAMUSCULAR; INTRAVENOUS EVERY 8 HOURS PRN
Status: DISCONTINUED | OUTPATIENT
Start: 2021-10-28 | End: 2021-10-30 | Stop reason: HOSPADM

## 2021-10-28 RX ORDER — SODIUM CHLORIDE 0.9 % (FLUSH) 0.9 %
10 SYRINGE (ML) INJECTION PRN
Status: DISCONTINUED | OUTPATIENT
Start: 2021-10-28 | End: 2021-10-30 | Stop reason: HOSPADM

## 2021-10-28 RX ORDER — ATORVASTATIN CALCIUM 80 MG/1
80 TABLET, FILM COATED ORAL NIGHTLY
COMMUNITY

## 2021-10-28 RX ORDER — HYDROCODONE BITARTRATE AND ACETAMINOPHEN 5; 325 MG/1; MG/1
2 TABLET ORAL EVERY 4 HOURS PRN
Status: DISCONTINUED | OUTPATIENT
Start: 2021-10-28 | End: 2021-10-30 | Stop reason: HOSPADM

## 2021-10-28 RX ORDER — FENTANYL CITRATE 50 UG/ML
INJECTION, SOLUTION INTRAMUSCULAR; INTRAVENOUS
Status: COMPLETED | OUTPATIENT
Start: 2021-10-28 | End: 2021-10-28

## 2021-10-28 RX ORDER — SODIUM CHLORIDE 9 MG/ML
25 INJECTION, SOLUTION INTRAVENOUS PRN
Status: DISCONTINUED | OUTPATIENT
Start: 2021-10-28 | End: 2021-10-30 | Stop reason: HOSPADM

## 2021-10-28 RX ORDER — HEPARIN SODIUM 5000 [USP'U]/ML
INJECTION, SOLUTION INTRAVENOUS; SUBCUTANEOUS
Status: COMPLETED | OUTPATIENT
Start: 2021-10-28 | End: 2021-10-28

## 2021-10-28 RX ORDER — DIPHENHYDRAMINE HCL 50 MG
100 CAPSULE ORAL EVERY 6 HOURS PRN
COMMUNITY

## 2021-10-28 RX ORDER — ASPIRIN 81 MG/1
81 TABLET ORAL ONCE
Status: DISCONTINUED | OUTPATIENT
Start: 2021-10-28 | End: 2021-10-30 | Stop reason: HOSPADM

## 2021-10-28 RX ORDER — ACETAMINOPHEN 325 MG/1
650 TABLET ORAL EVERY 4 HOURS PRN
Status: DISCONTINUED | OUTPATIENT
Start: 2021-10-28 | End: 2021-10-30 | Stop reason: HOSPADM

## 2021-10-28 RX ADMIN — SODIUM CHLORIDE: 9 INJECTION, SOLUTION INTRAVENOUS at 07:28

## 2021-10-28 RX ADMIN — FENTANYL CITRATE 25 MCG: 50 INJECTION, SOLUTION INTRAMUSCULAR; INTRAVENOUS at 08:07

## 2021-10-28 RX ADMIN — IOPAMIDOL 30 ML: 612 INJECTION, SOLUTION INTRATHECAL at 08:45

## 2021-10-28 RX ADMIN — FENTANYL CITRATE 25 MCG: 50 INJECTION, SOLUTION INTRAMUSCULAR; INTRAVENOUS at 08:19

## 2021-10-28 RX ADMIN — MIDAZOLAM 0.5 MG: 1 INJECTION INTRAMUSCULAR; INTRAVENOUS at 08:07

## 2021-10-28 RX ADMIN — CEFAZOLIN SODIUM 1000 MG: 1 INJECTION, POWDER, FOR SOLUTION INTRAMUSCULAR; INTRAVENOUS at 07:59

## 2021-10-28 RX ADMIN — HEPARIN SODIUM 5000 UNITS: 5000 INJECTION, SOLUTION INTRAVENOUS; SUBCUTANEOUS at 08:06

## 2021-10-28 RX ADMIN — LIDOCAINE HYDROCHLORIDE 10 ML: 20 INJECTION, SOLUTION INFILTRATION; PERINEURAL at 08:12

## 2021-10-28 RX ADMIN — MIDAZOLAM 0.5 MG: 1 INJECTION INTRAMUSCULAR; INTRAVENOUS at 08:19

## 2021-10-28 NOTE — OP NOTE
Preprocedure diagnosis:  1. End-stage renal disease on hemodialysis  2. Poorly functioning left brachial artery to cephalic vein arteriovenous fistula with increased bleeding post procedure    Post procedure diagnosis: Same    Procedure:  1. Left upper extremity fistulogram's including venography to the supra vena cava  2. Balloon angioplasty cephalic arch with 8 mm x 80 mm conquest balloon  3. Balloon angioplasty proximal/mid upper arm cephalic vein stenosis with 8 mm x 80 mm conquest balloon  4. Venograms after balloon angioplasty alone  5. Cephalic arch stent (Viabahn 8 mm x 100 mm)  6. Balloon angioplasty within the Viabahn stent with an 8 mm conquest balloon  7. Completion venograms left upper extremity    Surgeon: Deisi Gomez MD    Anesthesia: Intravenous/moderate sedation plus local    Estimated blood loss: Less than 50 mL    Findings:  1. The patient has a patent left brachial artery to cephalic vein arteriovenous fistula. The arteriovenous anastomosis is widely patent. The cephalic vein is between 7 and 10 mm. There are some aneurysms and puncture sites in the mid upper arm. Just proximal to the aneurysm, there is a fairly long segment 80% stenosis. There is then a fairly focal stenosis of the cephalic arch around 50%. The left subclavian, innominate, and superior vena cava are all widely patent. Indications for procedure: This is a 66-year-old man who has end-stage renal disease and diabetes mellitus type 2, who had to be admitted to [facility] with severe COVID-19 infection. During that hospitalization, they performed hemodialysis and the patient has significant post dialysis bleeding so they placed a right internal jugular vein tunneled dialysis catheter. The patient's been dialyzing through this since that hospitalization.     Procedure in detail:    After the patient was consented and given intravenous antibiotics, he was brought to angiography and placed on the table supine position. Intravenous/moderate sedation was administered and the patient's left arm was prepped and draped in usual sterile procedure. Skin and subcutaneous tissues in the distal upper arm anesthetized lidocaine. Micropuncture needle was used to cannulate the vein without difficulty. Seldinger technique was utilized place a 4 Western Nataliya glide sheath. Left upper extremity fistulogram's including venography to the superior vena cava were performed with above findings. I upsized to a 6 Western Nataliya sheath and then over an 035 wire, balloon angioplasty was performed of the cephalic arch stenosis with an 8 mm conquest balloon. Next, the proximal/mid upper arm cephalic vein long segment stenosis was treated with the same 8 mm diameter conquest balloon. Venograms after this showed good result. However, there is some residual stenosis of the cephalic arch so we upsized to an 8 Western Nataliya sheath and then over an 035 wire, utilizing roadmap assistance, a Viabahn stent was placed at the cephalic arch just extending into the subclavian vein. Its an 8 mm x 100 mm stent. Balloon angioplasty was performed within the stent with an 8 mm diameter balloon. Completion venogram showed good result with no significant residual stenosis nor extravasation of dye. The sheath was removed and the puncture site closed with 3-0 nylon suture. Sterile dressings were applied. The patient tolerated the procedure and was brought back to cath holding in good condition. His fistula can be used as early as tomorrow. Once it is working well, he can return for removal of his tunneled dialysis catheter.

## 2021-10-28 NOTE — INTERVAL H&P NOTE
I agree with the history and physical exam in chart. In addition, today's complete ROS was performed and the only positives are noted in the HPI. I examined the patient preoperatively and discussed the surgery, including the risks, benefits, and alternatives. They seem to understand and agree to proceed.   ASA III, [address]

## 2021-11-22 ENCOUNTER — PREP FOR PROCEDURE (OUTPATIENT)
Dept: VASCULAR SURGERY | Age: 66
End: 2021-11-22

## 2021-11-22 RX ORDER — LIDOCAINE HYDROCHLORIDE 10 MG/ML
20 INJECTION, SOLUTION EPIDURAL; INFILTRATION; INTRACAUDAL; PERINEURAL ONCE
Status: CANCELLED | OUTPATIENT
Start: 2021-11-22 | End: 2021-11-22

## 2021-11-22 RX ORDER — SODIUM CHLORIDE 9 MG/ML
25 INJECTION, SOLUTION INTRAVENOUS PRN
Status: CANCELLED | OUTPATIENT
Start: 2021-11-22

## 2021-11-22 NOTE — H&P
Patient Care Team:  Izola Dakin, MD as PCP - General (Cardiology)  Chuy Lopez MD as Consulting Physician (Nephrology)  Michelle Faria MD as Consulting Physician (Vascular Surgery)        Jessica De Santiago 77 y.o. male with a history of renal failure requiring hemodialysis access. Patient is in need of permacath removal    Patient Active Problem List   Diagnosis    Dialysis AV fistula malfunction, subsequent encounter    ESRD on dialysis ([facility] [address]    DM (diabetes mellitus) type II controlled with renal manifestation ([address]    Benign essential HTN    Hyperlipidemia      See attached meds  Allergies:  Patient has no known allergies. Past Medical History:   Diagnosis Date    Arthritis     Chronic kidney disease     CKD (chronic kidney disease) stage V requiring chronic dialysis ([facility] [address] 12/19/2019    COVID-19 08/2021    Diabetes mellitus ([facility] [address]     Diabetic retinopathy ([facility] [address]     Digestive disorder     \" slow digestion \"    Hemodialysis patient ([facility] [address]     Hyperlipemia     Hypertension     Legally blind     Neuropathy     feet      Past Surgical History:   Procedure Laterality Date    CATARACT REMOVAL      DIALYSIS FISTULA CREATION Left 2/4/2020    LIGATION OF LEFT RADIAL ARTERY TO LEFT CEPHALIC VEIN HEMODIALYSIS FISTULA; CREATION OF LEFT BRACHIAL ARTERY TO LEFT CEPHALIC VEIN PRIMARY ARTERIO-VENOUS FISTULA performed by Michelle Faria MD at . [address] Right 2/4/2020    INSERTION OF RIGHT INTERNAL JUGULAR HEMODIALYSIS CATHETER performed by Michelle Faria MD at [facility] [address] Left 7/2/2018    ARM RADICAL/ CEPHALIC AV FISTULA performed by Garlin Meckel, MD at [address] [facility]  12/13/2018    SJS. Left upper fistulograms, BA distal forearm cephalic vein [id number] cutter,coil branch (5x3(2)    VASCULAR SURGERY  03/28/2019    SJS. Left upper fistulograms/venograms.     VASCULAR SURGERY  02/04/2020    SJS Left brachial artery to Cephalic Vein Arteriovenous Fistula Creation, Ligation of distal forearm cephallic vein near the radial cephallic arteriovenous anastomosis, Ultrasound-guided cannulation of right internal jugular vein a placement of right internal jugular vein tunneled dialysis catheter (glidepath 23 cm tip to cuff)    VASCULAR SURGERY  02/04/2020    SJS  Left brachial artery Cephalic Vein Arteriovenous Fistula Creation, Ligation of distal forearm cephalic vein near the radial cephalic arteriovenous anastomosis, Ultrasound-guided cannulation of right internal juhular vein and placement of right internal jugular vein tunneled dialysis catheter (glidepath 23 cm tip to cuff)     VASCULAR SURGERY  04/02/2020    SJS removal of tunneled dialysis catheter right internal jugular vein      Family History   Problem Relation Age of Onset    Cancer Mother     Other Mother         COPD    Other Father         tractor accident      Social History     Tobacco Use    Smoking status: Never Smoker    Smokeless tobacco: Never Used   Substance Use Topics    Alcohol use: No     PE:  NAD  Left upper extremity av fistula thrill  Right IJV permcath site C/D/I, no signs of infection    A/P:    ESRD on hemodialysis  Functioning left upper extremity av fistula  No longer needs permcath    Permit for removal of permcath  Risks have been reviewed with the patient including but not limited to heart attack, death, CVA, bleeding, nerve injury, infection, steal, pain, and need for further surgery.       _______________________________________________________________________  Signature     Date    Time

## 2021-11-30 ENCOUNTER — HOSPITAL ENCOUNTER (OUTPATIENT)
Dept: OTHER | Age: 66
Discharge: HOME | End: 2021-11-30
Payer: MEDICARE

## 2021-11-30 VITALS
RESPIRATION RATE: 15 BRPM | HEART RATE: 79 BPM | OXYGEN SATURATION: 97 % | TEMPERATURE: 96.2 F | HEIGHT: 71 IN | DIASTOLIC BLOOD PRESSURE: 75 MMHG | WEIGHT: 186 LBS | BODY MASS INDEX: 26.04 KG/M2 | SYSTOLIC BLOOD PRESSURE: 145 MMHG

## 2021-11-30 PROCEDURE — 2500000003 HC RX 250 WO HCPCS: Performed by: SURGERY

## 2021-11-30 PROCEDURE — 36589 REMOVAL TUNNELED CV CATH: CPT | Performed by: SURGERY

## 2021-11-30 PROCEDURE — 36589 REMOVAL TUNNELED CV CATH: CPT

## 2021-11-30 RX ORDER — HYDROCODONE BITARTRATE AND ACETAMINOPHEN 5; 325 MG/1; MG/1
2 TABLET ORAL EVERY 4 HOURS PRN
Status: DISCONTINUED | OUTPATIENT
Start: 2021-11-30 | End: 2021-12-02 | Stop reason: HOSPADM

## 2021-11-30 RX ORDER — LIDOCAINE HYDROCHLORIDE 10 MG/ML
20 INJECTION, SOLUTION EPIDURAL; INFILTRATION; INTRACAUDAL; PERINEURAL ONCE
Status: DISCONTINUED | OUTPATIENT
Start: 2021-11-30 | End: 2021-11-30

## 2021-11-30 RX ORDER — SODIUM CHLORIDE 9 MG/ML
25 INJECTION, SOLUTION INTRAVENOUS PRN
Status: DISCONTINUED | OUTPATIENT
Start: 2021-11-30 | End: 2021-12-02 | Stop reason: HOSPADM

## 2021-11-30 RX ORDER — ACETAMINOPHEN 325 MG/1
650 TABLET ORAL EVERY 4 HOURS PRN
Status: DISCONTINUED | OUTPATIENT
Start: 2021-11-30 | End: 2021-12-02 | Stop reason: HOSPADM

## 2021-11-30 RX ORDER — ONDANSETRON 2 MG/ML
4 INJECTION INTRAMUSCULAR; INTRAVENOUS EVERY 8 HOURS PRN
Status: DISCONTINUED | OUTPATIENT
Start: 2021-11-30 | End: 2021-12-02 | Stop reason: HOSPADM

## 2021-11-30 RX ORDER — HYDROCODONE BITARTRATE AND ACETAMINOPHEN 5; 325 MG/1; MG/1
1 TABLET ORAL EVERY 4 HOURS PRN
Status: DISCONTINUED | OUTPATIENT
Start: 2021-11-30 | End: 2021-12-02 | Stop reason: HOSPADM

## 2021-11-30 RX ORDER — LIDOCAINE HYDROCHLORIDE 10 MG/ML
30 INJECTION, SOLUTION EPIDURAL; INFILTRATION; INTRACAUDAL; PERINEURAL ONCE
Status: COMPLETED | OUTPATIENT
Start: 2021-11-30 | End: 2021-11-30

## 2021-11-30 RX ADMIN — LIDOCAINE HYDROCHLORIDE 10 ML: 10 INJECTION, SOLUTION EPIDURAL; INFILTRATION; INTRACAUDAL; PERINEURAL at 07:51

## 2021-11-30 NOTE — PROGRESS NOTES
Dr. Dayanara Araya here and perm cath removed from right IJ site without difficulty. 2 sutures placed then manual pressure held at site for 10 minutes then bulky gauze dressing applied. HOB elevated.

## 2021-11-30 NOTE — PROGRESS NOTES
Patient and daughter instructed on care of right IJ site post perm cath removal.  Given written instructions. Both stated understanding.

## 2021-11-30 NOTE — OP NOTE
Preprocedure Diagnosis:    1. Patient has working fistula  2. CKD (stage V requiring hemodialysis)       Postprocedure Diagnosis:  Same    Procedure:  Removal of tunneled dialysis catheter right internal jugular vein    Surgeon:  Joe Tanner M.D. Anesthesia:  Local (1% lidocaine without epinephrine)    EBL:  Less than 50 ml    Findings: The cuff and catheter were completely removed. There were no signs of infection. Procedure in Detail:      After the patient was consented, the right neck and chest were prepped and draped in the usual sterile fashion. Skin and subcutaneous tissues around the exit site and over the cuff were anesthetized with lidocaine. Using a #15 blade, an incision was made around the exit site and the cuff is dissected away from the well incorporated subcutaneous tissues with sharp dissection. The catheter and cuff were then completely removed and pressure was held at the base of the neck for hemostasis. The wound/exit site was then closed with interrupted 3-0 nylon sutures. A sterile dressing was applied. The patient tolerated the procedure well. Sutures can be removed at the dialysis clinic in 1-2 weeks.

## 2021-11-30 NOTE — PROGRESS NOTES
Discharged home with daughter in stable condition. Ambulated with RN to front door. Right IJ site remains clear.

## 2022-09-19 ENCOUNTER — HOSPITAL ENCOUNTER (OUTPATIENT)
Age: 67
Setting detail: OBSERVATION
Discharge: HOME | End: 2022-09-20
Attending: EMERGENCY MEDICINE | Admitting: STUDENT IN AN ORGANIZED HEALTH CARE EDUCATION/TRAINING PROGRAM
Payer: MEDICARE

## 2022-09-19 ENCOUNTER — APPOINTMENT (OUTPATIENT)
Dept: GENERAL RADIOLOGY | Age: 67
End: 2022-09-19
Payer: MEDICARE

## 2022-09-19 ENCOUNTER — APPOINTMENT (OUTPATIENT)
Dept: CT IMAGING | Age: 67
End: 2022-09-19
Payer: MEDICARE

## 2022-09-19 DIAGNOSIS — N18.6 ESRD ON DIALYSIS (HCC): ICD-10-CM

## 2022-09-19 DIAGNOSIS — R55 NEAR SYNCOPE: Primary | ICD-10-CM

## 2022-09-19 DIAGNOSIS — Z99.2 ESRD ON DIALYSIS (HCC): ICD-10-CM

## 2022-09-19 LAB
ALBUMIN SERPL-MCNC: 4 G/DL (ref 3.5–5.2)
ALP BLD-CCNC: 111 U/L (ref 40–130)
ALT SERPL-CCNC: 29 U/L (ref 5–41)
ANION GAP SERPL CALCULATED.3IONS-SCNC: 14 MMOL/L (ref 7–19)
ANISOCYTOSIS: ABNORMAL
APTT: 34.2 SEC (ref 26–36.2)
AST SERPL-CCNC: 34 U/L (ref 5–40)
BASOPHILS ABSOLUTE: 0 K/UL (ref 0–0.2)
BASOPHILS RELATIVE PERCENT: 0 % (ref 0–1)
BILIRUB SERPL-MCNC: 0.3 MG/DL (ref 0.2–1.2)
BUN BLDV-MCNC: 45 MG/DL (ref 8–23)
CALCIUM SERPL-MCNC: 9.9 MG/DL (ref 8.8–10.2)
CHLORIDE BLD-SCNC: 92 MMOL/L (ref 98–111)
CO2: 33 MMOL/L (ref 22–29)
CREAT SERPL-MCNC: 4.7 MG/DL (ref 0.5–1.2)
EOSINOPHILS ABSOLUTE: 0.19 K/UL (ref 0–0.6)
EOSINOPHILS RELATIVE PERCENT: 1 % (ref 0–5)
GFR AFRICAN AMERICAN: 15
GFR NON-AFRICAN AMERICAN: 12
GLUCOSE BLD-MCNC: 122 MG/DL (ref 70–99)
GLUCOSE BLD-MCNC: 125 MG/DL (ref 74–109)
GLUCOSE BLD-MCNC: 140 MG/DL (ref 70–99)
GLUCOSE BLD-MCNC: 316 MG/DL (ref 70–99)
HBA1C MFR BLD: 6.5 % (ref 4–6)
HCT VFR BLD CALC: 32.8 % (ref 42–52)
HEMOGLOBIN: 10 G/DL (ref 14–18)
HYPOCHROMIA: ABNORMAL
IMMATURE GRANULOCYTES #: 0.2 K/UL
INR BLD: 1.08 (ref 0.88–1.18)
LYMPHOCYTES ABSOLUTE: 1.3 K/UL (ref 1.1–4.5)
LYMPHOCYTES RELATIVE PERCENT: 7 % (ref 20–40)
MAGNESIUM: 2.1 MG/DL (ref 1.6–2.4)
MCH RBC QN AUTO: 27.5 PG (ref 27–31)
MCHC RBC AUTO-ENTMCNC: 30.5 G/DL (ref 33–37)
MCV RBC AUTO: 90.1 FL (ref 80–94)
MONOCYTES ABSOLUTE: 3.2 K/UL (ref 0–0.9)
MONOCYTES RELATIVE PERCENT: 17 % (ref 0–10)
NEUTROPHILS ABSOLUTE: 14 K/UL (ref 1.5–7.5)
NEUTROPHILS RELATIVE PERCENT: 75 % (ref 50–65)
PDW BLD-RTO: 17.3 % (ref 11.5–14.5)
PERFORMED ON: ABNORMAL
PLATELET # BLD: 272 K/UL (ref 130–400)
PLATELET SLIDE REVIEW: ADEQUATE
PMV BLD AUTO: 9.1 FL (ref 9.4–12.4)
POLYCHROMASIA: ABNORMAL
POTASSIUM SERPL-SCNC: 3.5 MMOL/L (ref 3.5–5)
PROTHROMBIN TIME: 14 SEC (ref 12–14.6)
RBC # BLD: 3.64 M/UL (ref 4.7–6.1)
SARS-COV-2, NAAT: NOT DETECTED
SODIUM BLD-SCNC: 139 MMOL/L (ref 136–145)
TEAR DROP CELLS: ABNORMAL
TOTAL PROTEIN: 7.8 G/DL (ref 6.6–8.7)
TOXIC GRANULATION: ABNORMAL
TROPONIN: 0.06 NG/ML (ref 0–0.03)
TROPONIN: 0.06 NG/ML (ref 0–0.03)
WBC # BLD: 18.7 K/UL (ref 4.8–10.8)

## 2022-09-19 PROCEDURE — 6360000002 HC RX W HCPCS

## 2022-09-19 PROCEDURE — 93005 ELECTROCARDIOGRAM TRACING: CPT | Performed by: EMERGENCY MEDICINE

## 2022-09-19 PROCEDURE — 71045 X-RAY EXAM CHEST 1 VIEW: CPT

## 2022-09-19 PROCEDURE — 2580000003 HC RX 258

## 2022-09-19 PROCEDURE — 84484 ASSAY OF TROPONIN QUANT: CPT

## 2022-09-19 PROCEDURE — 83735 ASSAY OF MAGNESIUM: CPT

## 2022-09-19 PROCEDURE — 70450 CT HEAD/BRAIN W/O DYE: CPT | Performed by: RADIOLOGY

## 2022-09-19 PROCEDURE — G0378 HOSPITAL OBSERVATION PER HR: HCPCS

## 2022-09-19 PROCEDURE — 96372 THER/PROPH/DIAG INJ SC/IM: CPT

## 2022-09-19 PROCEDURE — 87635 SARS-COV-2 COVID-19 AMP PRB: CPT

## 2022-09-19 PROCEDURE — 82947 ASSAY GLUCOSE BLOOD QUANT: CPT

## 2022-09-19 PROCEDURE — 80053 COMPREHEN METABOLIC PANEL: CPT

## 2022-09-19 PROCEDURE — 85610 PROTHROMBIN TIME: CPT

## 2022-09-19 PROCEDURE — 87040 BLOOD CULTURE FOR BACTERIA: CPT

## 2022-09-19 PROCEDURE — 99285 EMERGENCY DEPT VISIT HI MDM: CPT

## 2022-09-19 PROCEDURE — 6370000000 HC RX 637 (ALT 250 FOR IP)

## 2022-09-19 PROCEDURE — 85730 THROMBOPLASTIN TIME PARTIAL: CPT

## 2022-09-19 PROCEDURE — 85025 COMPLETE CBC W/AUTO DIFF WBC: CPT

## 2022-09-19 PROCEDURE — 83036 HEMOGLOBIN GLYCOSYLATED A1C: CPT

## 2022-09-19 PROCEDURE — 70450 CT HEAD/BRAIN W/O DYE: CPT

## 2022-09-19 PROCEDURE — 36415 COLL VENOUS BLD VENIPUNCTURE: CPT

## 2022-09-19 RX ORDER — FERRIC CITRATE 210 MG/1
TABLET, COATED ORAL
COMMUNITY

## 2022-09-19 RX ORDER — MULTIVIT-MIN/FOLIC/VIT K/LYCOP 400-300MCG
1 TABLET ORAL DAILY
Status: DISCONTINUED | OUTPATIENT
Start: 2022-09-19 | End: 2022-09-19 | Stop reason: CLARIF

## 2022-09-19 RX ORDER — MECOBALAMIN 5000 MCG
10 TABLET,DISINTEGRATING ORAL NIGHTLY PRN
Status: DISCONTINUED | OUTPATIENT
Start: 2022-09-19 | End: 2022-09-20 | Stop reason: HOSPADM

## 2022-09-19 RX ORDER — ONDANSETRON 2 MG/ML
4 INJECTION INTRAMUSCULAR; INTRAVENOUS EVERY 6 HOURS PRN
Status: DISCONTINUED | OUTPATIENT
Start: 2022-09-19 | End: 2022-09-20 | Stop reason: HOSPADM

## 2022-09-19 RX ORDER — METOCLOPRAMIDE 5 MG/1
5 TABLET ORAL 4 TIMES DAILY
Status: DISCONTINUED | OUTPATIENT
Start: 2022-09-19 | End: 2022-09-20 | Stop reason: HOSPADM

## 2022-09-19 RX ORDER — ASPIRIN 81 MG/1
81 TABLET, CHEWABLE ORAL DAILY
Status: DISCONTINUED | OUTPATIENT
Start: 2022-09-19 | End: 2022-09-20 | Stop reason: HOSPADM

## 2022-09-19 RX ORDER — ASCORBIC ACID 500 MG
1000 TABLET ORAL DAILY
Status: ON HOLD | COMMUNITY
End: 2022-09-20 | Stop reason: HOSPADM

## 2022-09-19 RX ORDER — VITAMIN B COMPLEX
1 CAPSULE ORAL DAILY
COMMUNITY

## 2022-09-19 RX ORDER — HEPARIN SODIUM 5000 [USP'U]/ML
5000 INJECTION, SOLUTION INTRAVENOUS; SUBCUTANEOUS EVERY 8 HOURS SCHEDULED
Status: DISCONTINUED | OUTPATIENT
Start: 2022-09-19 | End: 2022-09-20 | Stop reason: HOSPADM

## 2022-09-19 RX ORDER — M-VIT,TX,IRON,MINS/CALC/FOLIC 27MG-0.4MG
1 TABLET ORAL DAILY
Status: DISCONTINUED | OUTPATIENT
Start: 2022-09-19 | End: 2022-09-20 | Stop reason: HOSPADM

## 2022-09-19 RX ORDER — SODIUM CHLORIDE 0.9 % (FLUSH) 0.9 %
5-40 SYRINGE (ML) INJECTION EVERY 12 HOURS SCHEDULED
Status: DISCONTINUED | OUTPATIENT
Start: 2022-09-19 | End: 2022-09-20 | Stop reason: HOSPADM

## 2022-09-19 RX ORDER — ONDANSETRON 4 MG/1
4 TABLET, ORALLY DISINTEGRATING ORAL EVERY 8 HOURS PRN
Status: DISCONTINUED | OUTPATIENT
Start: 2022-09-19 | End: 2022-09-20 | Stop reason: HOSPADM

## 2022-09-19 RX ORDER — ACETAMINOPHEN 325 MG/1
650 TABLET ORAL EVERY 6 HOURS PRN
Status: DISCONTINUED | OUTPATIENT
Start: 2022-09-19 | End: 2022-09-20 | Stop reason: HOSPADM

## 2022-09-19 RX ORDER — DEXTROSE MONOHYDRATE 100 MG/ML
INJECTION, SOLUTION INTRAVENOUS CONTINUOUS PRN
Status: DISCONTINUED | OUTPATIENT
Start: 2022-09-19 | End: 2022-09-20 | Stop reason: HOSPADM

## 2022-09-19 RX ORDER — INSULIN LISPRO 100 [IU]/ML
0-4 INJECTION, SOLUTION INTRAVENOUS; SUBCUTANEOUS NIGHTLY
Status: DISCONTINUED | OUTPATIENT
Start: 2022-09-19 | End: 2022-09-20 | Stop reason: HOSPADM

## 2022-09-19 RX ORDER — SEVELAMER CARBONATE 800 MG/1
800 TABLET, FILM COATED ORAL
Status: DISCONTINUED | OUTPATIENT
Start: 2022-09-19 | End: 2022-09-20 | Stop reason: HOSPADM

## 2022-09-19 RX ORDER — ACETAMINOPHEN 650 MG/1
650 SUPPOSITORY RECTAL EVERY 6 HOURS PRN
Status: DISCONTINUED | OUTPATIENT
Start: 2022-09-19 | End: 2022-09-20 | Stop reason: HOSPADM

## 2022-09-19 RX ORDER — ATORVASTATIN CALCIUM 40 MG/1
80 TABLET, FILM COATED ORAL NIGHTLY
Status: DISCONTINUED | OUTPATIENT
Start: 2022-09-19 | End: 2022-09-20 | Stop reason: HOSPADM

## 2022-09-19 RX ORDER — SODIUM CHLORIDE 0.9 % (FLUSH) 0.9 %
5-40 SYRINGE (ML) INJECTION PRN
Status: DISCONTINUED | OUTPATIENT
Start: 2022-09-19 | End: 2022-09-20 | Stop reason: HOSPADM

## 2022-09-19 RX ORDER — POLYETHYLENE GLYCOL 3350 17 G/17G
17 POWDER, FOR SOLUTION ORAL DAILY PRN
Status: DISCONTINUED | OUTPATIENT
Start: 2022-09-19 | End: 2022-09-20 | Stop reason: HOSPADM

## 2022-09-19 RX ORDER — AMPICILLIN 500 MG/1
1500 CAPSULE ORAL SEE ADMIN INSTRUCTIONS
Status: ON HOLD | COMMUNITY
End: 2022-09-20 | Stop reason: HOSPADM

## 2022-09-19 RX ORDER — INSULIN LISPRO 100 [IU]/ML
0-4 INJECTION, SOLUTION INTRAVENOUS; SUBCUTANEOUS
Status: DISCONTINUED | OUTPATIENT
Start: 2022-09-19 | End: 2022-09-20 | Stop reason: HOSPADM

## 2022-09-19 RX ORDER — SODIUM CHLORIDE 9 MG/ML
INJECTION, SOLUTION INTRAVENOUS PRN
Status: DISCONTINUED | OUTPATIENT
Start: 2022-09-19 | End: 2022-09-20 | Stop reason: HOSPADM

## 2022-09-19 RX ADMIN — SEVELAMER CARBONATE 800 MG: 800 TABLET, FILM COATED ORAL at 16:52

## 2022-09-19 RX ADMIN — INSULIN LISPRO 4 UNITS: 100 INJECTION, SOLUTION INTRAVENOUS; SUBCUTANEOUS at 20:39

## 2022-09-19 RX ADMIN — HEPARIN SODIUM 5000 UNITS: 5000 INJECTION INTRAVENOUS; SUBCUTANEOUS at 20:35

## 2022-09-19 RX ADMIN — ATORVASTATIN CALCIUM 80 MG: 40 TABLET, FILM COATED ORAL at 20:35

## 2022-09-19 RX ADMIN — Medication 10 MG: at 23:16

## 2022-09-19 RX ADMIN — METOCLOPRAMIDE 5 MG: 5 TABLET ORAL at 16:52

## 2022-09-19 RX ADMIN — SODIUM CHLORIDE, PRESERVATIVE FREE 10 ML: 5 INJECTION INTRAVENOUS at 20:37

## 2022-09-19 RX ADMIN — METOCLOPRAMIDE 5 MG: 5 TABLET ORAL at 20:35

## 2022-09-19 ASSESSMENT — ENCOUNTER SYMPTOMS
BACK PAIN: 0
COLOR CHANGE: 0
CHEST TIGHTNESS: 0
SHORTNESS OF BREATH: 0
SORE THROAT: 0
RHINORRHEA: 0
DIARRHEA: 0
CONSTIPATION: 0
ABDOMINAL PAIN: 0
COUGH: 0
NAUSEA: 0
WHEEZING: 0
VOMITING: 0
ABDOMINAL DISTENTION: 0

## 2022-09-19 ASSESSMENT — PAIN SCALES - GENERAL: PAINLEVEL_OUTOF10: 0

## 2022-09-19 NOTE — H&P
[id number] [facility]      Hospitalist - History & Physical      PCP: Frannie Peña MD    Date of Admission: 9/19/2022    Date of Service: 9/19/2022    Chief Complaint:  Near syncope    History Of Present Illness: The patient is a 79 y.o. male who presented to [facility] ER with PMH ESRD M,W,F, Type II DM, HTN, hyperlipemia  complaining of near syncope. Patient states that he was in his normal state of health this morning when he went to dialysis. While at dialysis he suddenly started \"feeling bad\". He states that he became lightheaded and dizzy. He denies loss of consciousness, headache, or visual change. Denies recent fall or trauma. Denies fever, chills, abdominal pain, shortness of breath, and chest pain. Of note, states that he has had recent UTI within the last month but currently denies urinary symptoms. Per nursing staff prior to [employer] arrival noted to be in Atrial fibrillation. However upon there arrival . Work up in ER CT head no acute intracranial abnormality, WBC 18.7, creat 4.7 BUN 45, Mag 2.1, troponin 0.06. Patient is to be admitted to hospitalist service due to near syncope.    Past Medical History:        Diagnosis Date    Arthritis     Chronic kidney disease     CKD (chronic kidney disease) stage V requiring chronic dialysis ([facility] 12/19/2019    COVID-19 08/2021    Diabetes mellitus ([facility]     Diabetic retinopathy ([facility]     Digestive disorder     \" slow digestion \"    Hemodialysis patient ([facility]     dialysis on mon, wed, and friday at [place]    Hyperlipemia     Hypertension     Legally blind     Neuropathy     feet       Past Surgical History:        Procedure Laterality Date    CATARACT REMOVAL      DIALYSIS FISTULA CREATION Left 2/4/2020    LIGATION OF LEFT RADIAL ARTERY TO LEFT CEPHALIC VEIN HEMODIALYSIS FISTULA; CREATION OF LEFT BRACHIAL ARTERY TO LEFT CEPHALIC VEIN PRIMARY ARTERIO-VENOUS FISTULA performed by Pelon Brito MD at [facility] Right 2/4/2020 INSERTION OF RIGHT INTERNAL JUGULAR HEMODIALYSIS CATHETER performed by Chiara Roa MD at [facility] 7/2/2018    ARM RADICAL/ CEPHALIC AV FISTULA performed by Nicolas Horan MD at [address] [place] [address]  12/13/2018    S. Left upper fistulograms, BA distal forearm cephalic vein 1L90 cutter,coil branch (5x3(2)    VASCULAR SURGERY  03/28/2019    [facility]. Left upper fistulograms/venograms. VASCULAR SURGERY  02/04/2020    S Left brachial artery to Cephalic Vein Arteriovenous Fistula Creation, Ligation of distal forearm cephallic vein near the radial cephallic arteriovenous anastomosis, Ultrasound-guided cannulation of right internal jugular vein a placement of right internal jugular vein tunneled dialysis catheter (glidepath 23 cm tip to cuff)    VASCULAR SURGERY  02/04/2020    S  Left brachial artery Cephalic Vein Arteriovenous Fistula Creation, Ligation of distal forearm cephalic vein near the radial cephalic arteriovenous anastomosis, Ultrasound-guided cannulation of right internal juhular vein and placement of right internal jugular vein tunneled dialysis catheter (glidepath 23 cm tip to cuff)     VASCULAR SURGERY  04/02/2020    S removal of tunneled dialysis catheter right internal jugular vein       Home Medications:  Prior to Admission medications    Medication Sig Start Date End Date Taking?  Authorizing Provider   atorvastatin (LIPITOR) 80 MG tablet Take 80 mg by mouth nightly    Historical Provider, MD   diphenhydrAMINE (BENADRYL) 50 MG capsule Take 100 mg by mouth every 6 hours as needed for Itching    Historical Provider, MD   midodrine (PROAMATINE) 5 MG tablet Take 10 mg by mouth as needed DURING DIALYSIS IF NEEDED    Historical Provider, MD   sevelamer (RENVELA) 800 MG tablet Take 1 tablet by mouth 3 times daily (with meals)    Historical Provider, MD   Multiple Vitamins-Minerals (VISION FORMULA 2) CAPS multivitamin Take 1 capsule by mouth daily    Historical Provider, MD   Ergocalciferol (VITAMIN D2 PO) Take 1,250 mcg by mouth once a week    Historical Provider, MD   insulin aspart protamine-insulin aspart (NOVOLOG MIX 70/30 FLEXPEN) (70-30) 100 UNIT/ML injection Inject 10 Units into the skin 2 times daily (with meals) 10 - 25 UNITS ONCE OR TWICE A DAY    Historical Provider, MD   melatonin 3 MG TABS tablet Take 10 mg by mouth nightly as needed     Historical Provider, MD   acetaminophen (TYLENOL) 500 MG tablet Take 500 mg by mouth every 6 hours as needed for Pain    Historical Provider, MD   fenofibrate 160 MG tablet Take 160 mg by mouth daily 5/6/18   Historical Provider, MD   metoclopramide (REGLAN) 5 MG tablet Take 5 mg by mouth 4 times daily 5/29/18   Historical Provider, MD   aspirin 81 MG tablet Take 81 mg by mouth daily    Historical Provider, MD       Allergies:    Patient has no known allergies. Social History:    The patient currently lives home  Tobacco:   reports that he has never smoked. He has never used smokeless tobacco.  Alcohol:   reports no history of alcohol use. Illicit Drugs: denies    Family History:      Problem Relation Age of Onset    Cancer Mother     Other Mother         COPD    Other Father         tractor accident       Review of Systems:   Review of Systems   Constitutional:  Negative for chills, diaphoresis, fatigue and fever. Respiratory:  Negative for cough, chest tightness, shortness of breath and wheezing. Cardiovascular:  Negative for chest pain and palpitations. Gastrointestinal:  Negative for abdominal distention, abdominal pain, constipation, nausea and vomiting. Skin:  Negative for color change, pallor and rash. Neurological:  Positive for dizziness and light-headedness. Negative for tremors, syncope, weakness, numbness and headaches. Psychiatric/Behavioral:  Negative for agitation, behavioral problems and confusion. 14 point review of systems is negative except as specifically addressed above.     Physical Examination:  /89   Pulse (!) 113   Temp 98.3 °F (36.8 °C)   Resp 16   Wt 175 lb 3 oz (79.5 kg)   SpO2 96%   BMI 24.43 kg/m²   Physical Exam  Vitals and nursing note reviewed. Constitutional:       Appearance: Normal appearance. HENT:      Mouth/Throat:      Mouth: Mucous membranes are moist.      Pharynx: Oropharynx is clear. Eyes:      Extraocular Movements: Extraocular movements intact. Conjunctiva/sclera: Conjunctivae normal.      Pupils: Pupils are equal, round, and reactive to light. Cardiovascular:      Rate and Rhythm: Normal rate and regular rhythm. Pulses: Normal pulses. Heart sounds: Murmur heard. Pulmonary:      Effort: Pulmonary effort is normal. No respiratory distress. Breath sounds: Normal breath sounds. Abdominal:      General: Bowel sounds are normal. There is no distension. Palpations: Abdomen is soft. Tenderness: There is no abdominal tenderness. There is no guarding or rebound. Musculoskeletal:         General: No swelling. Normal range of motion. Cervical back: Normal range of motion and neck supple. No rigidity or tenderness. Right lower leg: No edema. Left lower leg: No edema. Skin:     General: Skin is warm and dry. Capillary Refill: Capillary refill takes less than 2 seconds. Coloration: Skin is pale. Comments: LUE fistula palpable thrill, no active bleeding noted   Neurological:      General: No focal deficit present. Mental Status: He is alert and oriented to person, place, and time. Cranial Nerves: No cranial nerve deficit. Motor: Weakness (general) present.    Psychiatric:         Mood and Affect: Mood normal.         Behavior: Behavior normal.        Diagnostic Data:  CBC:  Recent Labs     09/19/22  1113   WBC 18.7*   HGB 10.0*   HCT 32.8*        BMP:  Recent Labs     09/19/22  1113      K 3.5   CL 92*   CO2 33*   BUN 45*   CREATININE 4.7*   CALCIUM 9.9     Recent Labs

## 2022-09-19 NOTE — ED PROVIDER NOTES
[facility] ONI/VAS/MED  eMERGENCY dEPARTMENT eNCOUnter      Pt Name: Chaz North  MRN: [id number]  [place] 1955  Date of evaluation: 9/19/2022  Provider: Yanni Smith MD    CHIEF COMPLAINT       Chief Complaint   Patient presents with    Hypertension     Pt went into Afib during dialysis and had near syncopal episode. Was in sinus tach on air evac arrival and alert and oriented x 4. Pt was hypertensive medic states he is normally running low on b/p so he requested to be seen           HISTORY OF PRESENT ILLNESS   (Location/Symptom, Timing/Onset,Context/Setting, Quality, Duration, Modifying Factors, Severity)  Note limiting factors. Chaz North is a 79 y.o. male who presents to the emergency department after a near syncopal event. Patient has known end-stage renal disease on Monday Wednesday Friday dialysis. He was at dialysis today whenever he tells me [facility]". Got dizzy and had a near syncopal event denies losing complete consciousness. Denies any chest pain palpitations or shortness of breath. Per nursing staff prior to [facility] arrival he was in A. fib however whenever they arrived he was in sinus tachycardia. Patient was concerned about being hypertensive however currently states he is near his baseline and was 148/80. Patient states he did have a headache earlier but that has since resolved. Denies any focal neurologic complaints. HPI    NursingNotes were reviewed. REVIEW OF SYSTEMS    (2-9 systems for level 4, 10 or more for level 5)     Review of Systems   Constitutional:  Negative for chills and fever. HENT:  Negative for rhinorrhea and sore throat. Eyes:  Negative for visual disturbance. Respiratory:  Negative for cough and shortness of breath. Cardiovascular:  Negative for chest pain, palpitations and leg swelling. Gastrointestinal:  Negative for abdominal pain, diarrhea, nausea and vomiting. Genitourinary:  Negative for dysuria and frequency. Musculoskeletal:  Negative for back pain and neck pain. Neurological:  Positive for dizziness, light-headedness and headaches. Negative for facial asymmetry, speech difficulty, weakness and numbness. All other systems reviewed and are negative. PAST MEDICALHISTORY     Past Medical History:   Diagnosis Date    Arthritis     Chronic kidney disease     CKD (chronic kidney disease) stage V requiring chronic dialysis ([facility] 12/19/2019    COVID-19 08/2021    Diabetes mellitus ([facility]     Diabetic retinopathy ([facility]     Digestive disorder     \" slow digestion \"    Hemodialysis patient ([facility]     dialysis on mon, wed, and friday at [facility]    Hyperlipemia     Hypertension     Legally blind     Neuropathy     feet         SURGICAL HISTORY       Past Surgical History:   Procedure Laterality Date    CATARACT REMOVAL      DIALYSIS FISTULA CREATION Left 2/4/2020    LIGATION OF LEFT RADIAL ARTERY TO LEFT CEPHALIC VEIN HEMODIALYSIS FISTULA; CREATION OF LEFT BRACHIAL ARTERY TO LEFT CEPHALIC VEIN PRIMARY ARTERIO-VENOUS FISTULA performed by Leticia Mclean MD at [facility] 2/4/2020    INSERTION OF RIGHT INTERNAL JUGULAR HEMODIALYSIS CATHETER performed by Leticia Mclean MD at [facility] 7/2/2018    ARM RADICAL/ CEPHALIC AV FISTULA performed by Chintan Sosa MD at [address] [place] [address]  12/13/2018    S. Left upper fistulograms, BA distal forearm cephalic vein 1H67 cutter,coil branch (5x3(2)    VASCULAR SURGERY  03/28/2019    S. Left upper fistulograms/venograms.     VASCULAR SURGERY  02/04/2020    [facility] Left brachial artery to Cephalic Vein Arteriovenous Fistula Creation, Ligation of distal forearm cephallic vein near the radial cephallic arteriovenous anastomosis, Ultrasound-guided cannulation of right internal jugular vein a placement of right internal jugular vein tunneled dialysis catheter (glidepath 23 cm tip to cuff)    VASCULAR SURGERY 02/04/2020    [facility]  Left brachial artery Cephalic Vein Arteriovenous Fistula Creation, Ligation of distal forearm cephalic vein near the radial cephalic arteriovenous anastomosis, Ultrasound-guided cannulation of right internal juhular vein and placement of right internal jugular vein tunneled dialysis catheter (glidepath 23 cm tip to cuff)     VASCULAR SURGERY  04/02/2020    [facility] removal of tunneled dialysis catheter right internal jugular vein         CURRENT MEDICATIONS     Current Discharge Medication List        CONTINUE these medications which have NOT CHANGED    Details   b complex vitamins capsule Take 1 capsule by mouth daily      vitamin C (ASCORBIC ACID) 500 MG tablet Take 1,000 mg by mouth daily      ferric citrate (AURYXIA) 1  MG(Fe) TABS tablet Take by mouth 3 times daily (with meals)      ampicillin (PRINCIPEN) 500 MG capsule Take 1,500 mg by mouth See Admin Instructions Take on dialysis days for 2 occurrences      atorvastatin (LIPITOR) 80 MG tablet Take 80 mg by mouth nightly      diphenhydrAMINE (BENADRYL) 50 MG capsule Take 100 mg by mouth every 6 hours as needed for Itching      midodrine (PROAMATINE) 5 MG tablet Take 10 mg by mouth as needed DURING DIALYSIS IF NEEDED      sevelamer (RENVELA) 800 MG tablet Take 1 tablet by mouth Daily with supper      Ergocalciferol (VITAMIN D2 PO) Take 1,250 mcg by mouth once a week Takes on Wednesday      insulin aspart protamine-insulin aspart (NOVOLOG 70/30) (70-30) 100 UNIT/ML injection Inject 10 Units into the skin 2 times daily (with meals) 10 - 25 UNITS ONCE OR TWICE A DAY      melatonin 3 MG TABS tablet Take 10 mg by mouth nightly as needed       acetaminophen (TYLENOL) 500 MG tablet Take 500 mg by mouth every 6 hours as needed for Pain      fenofibrate 160 MG tablet Take 160 mg by mouth at bedtime      metoclopramide (REGLAN) 5 MG tablet Take 5 mg by mouth 4 times daily      aspirin 81 MG tablet Take 81 mg by mouth daily             ALLERGIES Patient has no known allergies. FAMILY HISTORY       Family History   Problem Relation Age of Onset    Cancer Mother     Other Mother         COPD    Other Father         tractor accident          SOCIAL HISTORY       Social History     Socioeconomic History    Marital status:      Spouse name: None    Number of children: None    Years of education: None    Highest education level: None   Tobacco Use    Smoking status: Never    Smokeless tobacco: Never   Vaping Use    Vaping Use: Never used   Substance and Sexual Activity    Alcohol use: No    Drug use: No       SCREENINGS   NIH Stroke Scale  Interval: Baseline  Level of Consciousness (1a): Alert  LOC Questions (1b): Answers both correctly  LOC Commands (1c): Performs both tasks correctly  Best Gaze (2): Normal  Visual (3): No visual loss  Facial Palsy (4): Normal symmetrical movement  Motor Arm, Left (5a): No drift  Motor Arm, Right (5b): No drift  Motor Leg, Left (6a): No drift  Motor Leg, Right (6b): No drift  Limb Ataxia (7): Absent  Sensory (8): Normal  Best Language (9): No aphasia  Dysarthria (10): Normal  Extinction and Inattention (11): No abnormality  Total: 0Glasgow Coma Scale  Eye Opening: Spontaneous  Best Verbal Response: Oriented  Best Motor Response: Obeys commands  [place] Coma Scale Score: 15        PHYSICAL EXAM    (up to 7 for level 4, 8 or more for level 5)     ED Triage Vitals   BP Temp Temp src Pulse Resp SpO2 Height Weight   -- -- -- -- -- -- -- --       Physical Exam  Vitals and nursing note reviewed. Constitutional:       Appearance: Normal appearance. He is well-developed. He is not ill-appearing or diaphoretic. HENT:      Head: Normocephalic and atraumatic. Nose: Nose normal.      Mouth/Throat:      Mouth: Mucous membranes are moist.   Eyes:      Conjunctiva/sclera: Conjunctivae normal.      Pupils: Pupils are equal, round, and reactive to light. Neck:      Trachea: No tracheal deviation.    Cardiovascular: Rate and Rhythm: Normal rate and regular rhythm. Pulses: Normal pulses. Heart sounds: Normal heart sounds. No murmur heard. Pulmonary:      Effort: Pulmonary effort is normal.      Breath sounds: Normal breath sounds. No wheezing or rales. Abdominal:      Palpations: Abdomen is soft. Tenderness: There is no abdominal tenderness. Musculoskeletal:         General: Normal range of motion. Cervical back: Normal range of motion and neck supple. Right lower leg: No edema. Left lower leg: No edema. Comments: Left upper extremity fistula with palpable thrill no active bleeding   Skin:     General: Skin is warm and dry. Neurological:      Mental Status: He is alert and oriented to person, place, and time. GCS: GCS eye subscore is 4. GCS verbal subscore is 5. GCS motor subscore is 6. Cranial Nerves: No cranial nerve deficit, dysarthria or facial asymmetry. Sensory: Sensation is intact. Motor: No abnormal muscle tone. Coordination: Coordination is intact. DIAGNOSTIC RESULTS     EKG: All EKG's areinterpreted by the Emergency Department Physician who either signs or Co-signs this chart in the absence of a cardiologist.    112 sinus tachycardia right bundle branch block  nondiagnostic EKG    RADIOLOGY:  Non-plain film images such as CT, Ultrasound and MRI are read by the radiologist. Plain radiographic images are visualized and preliminarily interpreted bythe emergency physician with the below findings:          XR CHEST PORTABLE   Final Result   No active pulmonary disease. Recommendation: Follow up as clinically indicated. Electronically Signed by Elvis Mcintyre MD at 19-Sep-2022 04:04:53 PM               CT HEAD WO CONTRAST   Final Result   1. No acute intracranial abnormality. Recommendation: Follow up as clinically indicated.    All CT scans at this facility utilize dose modulation, iterative reconstruction, and/or weight based dosing when appropriate to reduce radiation dose to as low as reasonably achievable. Electronically Signed by Yumi Kapoor MD at 19-Sep-2022 03:52:45 PM                       LABS:  Labs Reviewed   CBC WITH AUTO DIFFERENTIAL - Abnormal; Notable for the following components:       Result Value    WBC 18.7 (*)     RBC 3.64 (*)     Hemoglobin 10.0 (*)     Hematocrit 32.8 (*)     MCHC 30.5 (*)     RDW 17.3 (*)     MPV 9.1 (*)     Neutrophils % 75.0 (*)     Lymphocytes % 7.0 (*)     Monocytes % 17.0 (*)     Neutrophils Absolute 14.0 (*)     Monocytes Absolute 3.20 (*)     Toxic Granulation 1+ (*)     Anisocytosis 2+ (*)     Polychromasia 1+ (*)     Hypochromia 2+ (*)     Tear Drop Cells Occasional (*)     All other components within normal limits   COMPREHENSIVE METABOLIC PANEL - Abnormal; Notable for the following components:    Chloride 92 (*)     CO2 33 (*)     Glucose 125 (*)     BUN 45 (*)     Creatinine 4.7 (*)     GFR Non- 12 (*)     GFR  15 (*)     All other components within normal limits   TROPONIN - Abnormal; Notable for the following components:    Troponin 0.06 (*)     All other components within normal limits   HEMOGLOBIN A1C - Abnormal; Notable for the following components:    Hemoglobin A1C 6.5 (*)     All other components within normal limits   COVID-19, RAPID   APTT   MAGNESIUM   PROTIME-INR   TROPONIN   URINALYSIS WITH REFLEX TO CULTURE   POCT GLUCOSE   POCT GLUCOSE       All other labs were within normal range or not returned as of this dictation.     EMERGENCY DEPARTMENT COURSE and DIFFERENTIAL DIAGNOSIS/MDM:   Vitals:    Vitals:    09/19/22 1130 09/19/22 1202 09/19/22 1230 09/19/22 1449   BP: (!) 139/47 (!) 114/93 128/89 122/82   Pulse: (!) 118 (!) 118 (!) 113 53   Resp:  18 16 16   Temp:  97.7 °F (36.5 °C) 98.3 °F (36.8 °C) 98.2 °F (36.8 °C)   TempSrc:    Oral   SpO2: 94% 95% 96% 100%   Weight:   175 lb 3 oz (79.5 kg)        MDM     Amount and/or Complexity of Data Reviewed  Clinical lab tests: ordered and reviewed  Tests in the radiology section of CPT®: ordered and reviewed  Discuss the patient with other providers: yes  Independent visualization of images, tracings, or specimens: yes      VSS here, remains in NSR, ?hx of afib earlier, had near synopal event while at dialysis now back to  baseline. No chest slightl trop elevation likely due to esrd but no prior comparisons, cont to trend. D/w hospitalist for overnight obs for any arrhythmias etc.        CONSULTS:  None    PROCEDURES:  Unless otherwise noted below, none     Procedures    FINAL IMPRESSION      1. Near syncope    2. ESRD on dialysis [facility])          DISPOSITION/PLAN   DISPOSITION Admitted 09/19/2022 02:06:00 PM      PATIENT REFERRED TO:  No follow-up provider specified.     DISCHARGE MEDICATIONS:  Current Discharge Medication List             (Please note that portions of this note were completed with a voice recognition program.  Efforts were made to edit thedictations but occasionally words are mis-transcribed.)    Zita Hagan MD (electronically signed)  Attending Emergency Physician        Savage Atkinson MD  09/19/22 7028

## 2022-09-20 VITALS
RESPIRATION RATE: 16 BRPM | HEART RATE: 86 BPM | HEIGHT: 71 IN | DIASTOLIC BLOOD PRESSURE: 74 MMHG | BODY MASS INDEX: 24.47 KG/M2 | WEIGHT: 174.8 LBS | SYSTOLIC BLOOD PRESSURE: 126 MMHG | TEMPERATURE: 98.1 F | OXYGEN SATURATION: 96 %

## 2022-09-20 LAB
ANION GAP SERPL CALCULATED.3IONS-SCNC: 14 MMOL/L (ref 7–19)
BACTERIA: NEGATIVE /HPF
BILIRUBIN URINE: NEGATIVE
BLOOD, URINE: NEGATIVE
BUN BLDV-MCNC: 55 MG/DL (ref 8–23)
C-REACTIVE PROTEIN: 6.97 MG/DL (ref 0–0.5)
CALCIUM SERPL-MCNC: 8.7 MG/DL (ref 8.8–10.2)
CHLORIDE BLD-SCNC: 93 MMOL/L (ref 98–111)
CLARITY: CLEAR
CO2: 29 MMOL/L (ref 22–29)
COLOR: YELLOW
CREAT SERPL-MCNC: 6.4 MG/DL (ref 0.5–1.2)
CRYSTALS, UA: ABNORMAL /HPF
EKG P AXIS: 0 DEGREES
EKG P AXIS: 37 DEGREES
EKG P-R INTERVAL: 126 MS
EKG P-R INTERVAL: 212 MS
EKG Q-T INTERVAL: 435 MS
EKG Q-T INTERVAL: 435 MS
EKG QRS DURATION: 152 MS
EKG QRS DURATION: 153 MS
EKG QTC CALCULATION (BAZETT): 521 MS
EKG QTC CALCULATION (BAZETT): 594 MS
EKG T AXIS: 25 DEGREES
EKG T AXIS: 9 DEGREES
EPITHELIAL CELLS, UA: 0 /HPF (ref 0–5)
FOLATE: 11.2 NG/ML (ref 4.5–32.2)
GFR AFRICAN AMERICAN: 11
GFR NON-AFRICAN AMERICAN: 9
GLUCOSE BLD-MCNC: 159 MG/DL (ref 70–99)
GLUCOSE BLD-MCNC: 164 MG/DL (ref 74–109)
GLUCOSE URINE: 100 MG/DL
HCT VFR BLD CALC: 29.2 % (ref 42–52)
HEMOGLOBIN: 8.9 G/DL (ref 14–18)
HYALINE CASTS: 0 /HPF (ref 0–8)
KETONES, URINE: NEGATIVE MG/DL
LEUKOCYTE ESTERASE, URINE: ABNORMAL
LV EF: 58 %
LVEF MODALITY: NORMAL
MAGNESIUM: 2 MG/DL (ref 1.6–2.4)
MCH RBC QN AUTO: 28.2 PG (ref 27–31)
MCHC RBC AUTO-ENTMCNC: 30.5 G/DL (ref 33–37)
MCV RBC AUTO: 92.4 FL (ref 80–94)
NITRITE, URINE: NEGATIVE
PDW BLD-RTO: 17.3 % (ref 11.5–14.5)
PERFORMED ON: ABNORMAL
PH UA: 8.5 (ref 5–8)
PHOSPHORUS: 4.9 MG/DL (ref 2.5–4.5)
PLATELET # BLD: 260 K/UL (ref 130–400)
PMV BLD AUTO: 9.7 FL (ref 9.4–12.4)
POTASSIUM REFLEX MAGNESIUM: 4.1 MMOL/L (ref 3.5–5)
PROTEIN UA: 100 MG/DL
RBC # BLD: 3.16 M/UL (ref 4.7–6.1)
RBC UA: 1 /HPF (ref 0–4)
SEDIMENTATION RATE, ERYTHROCYTE: 97 MM/HR (ref 0–15)
SODIUM BLD-SCNC: 136 MMOL/L (ref 136–145)
SPECIFIC GRAVITY UA: 1.01 (ref 1–1.03)
TROPONIN: 0.06 NG/ML (ref 0–0.03)
UROBILINOGEN, URINE: 0.2 E.U./DL
VITAMIN B-12: 1524 PG/ML (ref 211–946)
WBC # BLD: 12.3 K/UL (ref 4.8–10.8)
WBC UA: 5 /HPF (ref 0–5)

## 2022-09-20 PROCEDURE — 85027 COMPLETE CBC AUTOMATED: CPT

## 2022-09-20 PROCEDURE — 93246 EXT ECG>7D<15D RECORDING: CPT

## 2022-09-20 PROCEDURE — 82607 VITAMIN B-12: CPT

## 2022-09-20 PROCEDURE — 82947 ASSAY GLUCOSE BLOOD QUANT: CPT

## 2022-09-20 PROCEDURE — 93246 EXT ECG>7D<15D RECORDING: CPT | Performed by: INTERNAL MEDICINE

## 2022-09-20 PROCEDURE — 2580000003 HC RX 258

## 2022-09-20 PROCEDURE — 82746 ASSAY OF FOLIC ACID SERUM: CPT

## 2022-09-20 PROCEDURE — 86140 C-REACTIVE PROTEIN: CPT

## 2022-09-20 PROCEDURE — 84484 ASSAY OF TROPONIN QUANT: CPT

## 2022-09-20 PROCEDURE — G0378 HOSPITAL OBSERVATION PER HR: HCPCS

## 2022-09-20 PROCEDURE — 93010 ELECTROCARDIOGRAM REPORT: CPT | Performed by: INTERNAL MEDICINE

## 2022-09-20 PROCEDURE — 84100 ASSAY OF PHOSPHORUS: CPT

## 2022-09-20 PROCEDURE — 85652 RBC SED RATE AUTOMATED: CPT

## 2022-09-20 PROCEDURE — 36415 COLL VENOUS BLD VENIPUNCTURE: CPT

## 2022-09-20 PROCEDURE — 96372 THER/PROPH/DIAG INJ SC/IM: CPT

## 2022-09-20 PROCEDURE — 80048 BASIC METABOLIC PNL TOTAL CA: CPT

## 2022-09-20 PROCEDURE — 93306 TTE W/DOPPLER COMPLETE: CPT

## 2022-09-20 PROCEDURE — 93005 ELECTROCARDIOGRAM TRACING: CPT

## 2022-09-20 PROCEDURE — 6360000002 HC RX W HCPCS

## 2022-09-20 PROCEDURE — 6370000000 HC RX 637 (ALT 250 FOR IP)

## 2022-09-20 PROCEDURE — 81001 URINALYSIS AUTO W/SCOPE: CPT

## 2022-09-20 PROCEDURE — 83735 ASSAY OF MAGNESIUM: CPT

## 2022-09-20 RX ADMIN — HEPARIN SODIUM 5000 UNITS: 5000 INJECTION INTRAVENOUS; SUBCUTANEOUS at 13:20

## 2022-09-20 RX ADMIN — SEVELAMER CARBONATE 800 MG: 800 TABLET, FILM COATED ORAL at 08:42

## 2022-09-20 RX ADMIN — SEVELAMER CARBONATE 800 MG: 800 TABLET, FILM COATED ORAL at 13:20

## 2022-09-20 RX ADMIN — MULTIPLE VITAMINS W/ MINERALS TAB 1 TABLET: TAB at 08:42

## 2022-09-20 RX ADMIN — ASPIRIN 81 MG 81 MG: 81 TABLET ORAL at 08:42

## 2022-09-20 RX ADMIN — SODIUM CHLORIDE, PRESERVATIVE FREE 10 ML: 5 INJECTION INTRAVENOUS at 08:43

## 2022-09-20 RX ADMIN — METOCLOPRAMIDE 5 MG: 5 TABLET ORAL at 08:42

## 2022-09-20 RX ADMIN — METOCLOPRAMIDE 5 MG: 5 TABLET ORAL at 13:20

## 2022-09-20 RX ADMIN — HEPARIN SODIUM 5000 UNITS: 5000 INJECTION INTRAVENOUS; SUBCUTANEOUS at 05:31

## 2022-09-20 NOTE — PLAN OF CARE
Problem: Discharge Planning  Goal: Discharge to home or other facility with appropriate resources  Outcome: Progressing  Flowsheets  Taken 9/19/2022 2035 by Shakila Jean RN  Discharge to home or other facility with appropriate resources: Identify barriers to discharge with patient and caregiver  Taken 9/19/2022 1445 by Pina Campos RN  Discharge to home or other facility with appropriate resources:   Identify barriers to discharge with patient and caregiver   Identify discharge learning needs (meds, wound care, etc)     Problem: Safety - Adult  Goal: Free from fall injury  Outcome: Progressing     Problem: ABCDS Injury Assessment  Goal: Absence of physical injury  Outcome: Progressing

## 2022-09-20 NOTE — ACP (ADVANCE CARE PLANNING)
Advance Care Planning   Healthcare Decision Maker:    Primary Decision Maker: Iwona Churchill - Child - [phone]    Outcome:  Patient named his DM per legal next-of-kin.     Electronically signed by Vic Watson  Dovo on 9/20/2022 at 1:45 PM

## 2022-09-20 NOTE — DISCHARGE SUMMARY
[facility], [address] [place] [address]    DEPARTMENT OF HOSPITALIST MEDICINE      DISCHARGE SUMMARY:      PATIENT NAME:  Ashley Rodriguez  :  1955  MRN:  [id number]    Admission Date:   2022 10:55 AM Attending: No att. providers found   Discharge Date:   2022              PCP: Jose Donaldson MD  Length of Stay: 0 days     Chief Complaint on Admission:   Chief Complaint   Patient presents with    Hypertension     Pt went into Afib during dialysis and had near syncopal episode. Was in sinus tach on air evac arrival and alert and oriented x 4. Pt was hypertensive medic states he is normally running low on b/p so he requested to be seen         Consultants:     None       Discharge Problem List:   Principal Problem:    Near syncope  Active Problems:    ESRD on dialysis ([facility] [address]    DM (diabetes mellitus) type II controlled with renal manifestation ([facility] [address]    Hyperlipidemia  Resolved Problems:    * No resolved hospital problems. *         Last dated Assessment and Plan . .. 22    Near syncope              -CT head               - CXR    - ECHO   -Trend troponin               - Neuro checks              - Orthostatic blood pressure and pulse daily               - Fall precaution              - Bed alarm on              - Monitor on telemetry  Active Problems:    ESRD on dialysis               - Monitor I's and O's closely              - Monitor labs closely              - Avoid hypotension              - Avoid nephrotoxic agents    DM (diabetes mellitus) type II controlled with renal manifestation   -Accucheck  -A1c  -Sliding scale insulin   -Hypoglycemia protocol as warranted      Hyperlipidemia              -noted      CUMULATIVE  HOSPITAL  COURSE  AND  TREATMENT:  24-year-old male with ESRD on HD, T2DM, and hypertension who presented with near syncope during dialysis. He was admitted for overnight observation and syncope eval.  No arrhythmia noted on telemetry.   Echo showed mild MR, mildly dilated left atrium, but no other significant abnormalities. He was discharged home with an event monitor in place. OBJECTIVE:  /74   Pulse 86   Temp 98.1 °F (36.7 °C) (Temporal)   Resp 16   Ht 5' 11\" (1.803 m)   Wt 174 lb 12.8 oz (79.3 kg)   SpO2 96%   BMI 24.38 kg/m²       Heart: RRR   Lungs: Bilateral fair air entry   Abdomen: Soft, non-tender   Extremities: No edema   Neurologic: Alert and oriented   Skin: Warm and dry          Laboratory Data:  Recent Labs     09/19/22  1113 09/20/22  0232   WBC 18.7* 12.3*   HGB 10.0* 8.9*    260     Recent Labs     09/19/22  1113 09/20/22  0232    136   K 3.5 4.1   CL 92* 93*   CO2 33* 29   BUN 45* 55*   CREATININE 4.7* 6.4*   GLUCOSE 125* 164*     Recent Labs     09/19/22  1113   AST 34   ALT 29   BILITOT 0.3   ALKPHOS 111     Troponin T:   Recent Labs     09/19/22  1113 09/19/22  2148 09/20/22  0232   TROPONINI 0.06* 0.06* 0.06*     Pro-BNP: No results for input(s): BNP in the last 72 hours. INR:   Recent Labs     09/19/22  1113   INR 1.08     UA:  Recent Labs     09/20/22  0908   COLORU YELLOW   PHUR 8.5*   WBCUA 5   RBCUA 1   BACTERIA NEGATIVE*   CLARITYU Clear   SPECGRAV 1.007   LEUKOCYTESUR TRACE*   UROBILINOGEN 0.2   BILIRUBINUR Negative   BLOODU Negative   GLUCOSEU 100*     A1C:   Recent Labs     09/19/22  1113   LABA1C 6.5*     ABG:No results for input(s): PHART, PBL4QIJ, PO2ART, AZE3ECA, BEART, HGBAE, S7QRHAGA, CARBOXHGBART in the last 72 hours. Impressions of imaging performed in 48 hours before discharge:    CT HEAD WO CONTRAST    Result Date: 9/19/2022  1. No acute intracranial abnormality. Recommendation: Follow up as clinically indicated. All CT scans at this facility utilize dose modulation, iterative reconstruction, and/or weight based dosing when appropriate to reduce radiation dose to as low as reasonably achievable.  Electronically Signed by Jeffrey Pulliam MD at 19-Sep-2022 03:52:45 PM             XR CHEST PORTABLE    Result Date: 9/19/2022  No active pulmonary disease. Recommendation: Follow up as clinically indicated. Electronically Signed by Elza Stratton MD at 19-Sep-2022 04:04:53 PM                   Medication List        CONTINUE taking these medications      acetaminophen 500 MG tablet  Commonly known as: TYLENOL     aspirin 81 MG tablet     atorvastatin 80 MG tablet  Commonly known as: LIPITOR     Auryxia 1  MG(Fe) Tabs tablet  Generic drug: ferric citrate     b complex vitamins capsule     diphenhydrAMINE 50 MG capsule  Commonly known as: BENADRYL     insulin aspart protamine-insulin aspart (70-30) 100 UNIT/ML injection  Commonly known as: NovoLOG 70/30     melatonin 3 MG Tabs tablet     metoclopramide 5 MG tablet  Commonly known as: REGLAN     midodrine 5 MG tablet  Commonly known as: PROAMATINE     sevelamer 800 MG tablet  Commonly known as: RENVELA     VITAMIN D2 PO            STOP taking these medications      ampicillin 500 MG capsule  Commonly known as: PRINCIPEN     fenofibrate 160 MG tablet  Commonly known as: TRIGLIDE     vitamin C 500 MG tablet  Commonly known as: ASCORBIC ACID                ISSUES TO BE ADDRESSED AT HOSPITAL FOLLOW-UP VISIT:    Near syncope: Follow-up results of Zio patch. Evaluate if patient is becoming hypotensive during dialysis sessions. Advised patient to follow-up closely with PCP upon discharge for management of chronic medical issues  Please see the detailed discharge directions delivered from earlier today! Condition on Discharge: stable  Discharge Disposition: Home    Recommended Follow Up:  Sonia Gordon MD  [address] [facility]  [phone]    Follow up on 9/29/2022  Follow-up Hospital discharge scheduled for 09/29/2022 @ 2:30 PM    Followup Appointments Scheduled at Time of Discharge:  No future appointments. Discharge Instructions:   Please see the discharge paperwork.     Patient was seen at bedside today, and the examination shows improvement since yesterday. Detailed discharge directions delivered to the patient by myself and our nursing staff, who verbalizes understanding and is very happy and satisfied with the plan. Patient has been advised to continue all medications as prescribed and advised, and f/u with PCP within 1 week. Patient is stable from medical standpoint to be discharged. Total time spent during patient evaluation and assessment, discussion with the nurse/family, addressing discharge medications/scripts and coordination of care for safe discharge was in excess of 35 minutes.       Signed Electronically:    Silverio Crowe MD  5:32 PM 9/20/2022

## 2022-09-20 NOTE — PROGRESS NOTES
Called EKG for zio patch placement.     Electronically signed by Mary Her RN on 9/20/2022 at 1:15 PM

## 2022-09-21 ENCOUNTER — CARE COORDINATION (OUTPATIENT)
Dept: CASE MANAGEMENT | Age: 67
End: 2022-09-21

## 2022-09-21 NOTE — CARE COORDINATION
[facility] Transitions Initial Follow Up Call    Call within 2 business days of discharge: Yes    Patient: Steve Hudson Patient : 1955   MRN: [id number]  Reason for Admission:   Discharge Date: 22 RARS: No data recorded    Last Discharge [facility]       Date Complaint Diagnosis Description Type Department Provider    22 Hypertension Near syncope . .. ED to Hosp-Admission (Discharged) (ADMITTED) Carl Luna MD; Vianey Bradshaw MD... Spoke with: N/A    Facility: [place] [address]    Non-face-to-face services provided:  Reviewed encounter information for continuity of care prior to follow up phone call - chart notes, consults, progress notes, test results, med list, appointments, AVS, other information. Care Transitions 24 Hour Call    Care Transitions Interventions         Follow Up : Attempted to make contact with Tha for an initial follow up call post discharge from the hospital without success. Unable to leave a message regarding intent of call and call back information. Will try again my next business day. No future appointments.     Alok Daugherty RN

## 2022-09-22 ENCOUNTER — CARE COORDINATION (OUTPATIENT)
Dept: CASE MANAGEMENT | Age: 67
End: 2022-09-22

## 2022-09-22 NOTE — CARE COORDINATION
[facility] Transitions Initial Follow Up Call    Call within 2 business days of discharge: Yes    Patient: Dedrick Delacruz Patient : 1955   MRN: [id number]  Reason for Admission:   Discharge Date: 22 RARS: No data recorded    Last Discharge [facility]       Date Complaint Diagnosis Description Type Department Provider    22 Hypertension Near syncope . .. ED to Hosp-Admission (Discharged) (ADMITTED) Fortunato Saldaña MD; Lennox Adler, MD... Spoke with: [facility]: [place] [address]      Non-face-to-face services provided:  Obtained and reviewed discharge summary and/or continuity of care documents Reviewed encounter information for continuity of care prior to follow up phone call - chart notes, consults, progress notes, test results, med list, appointments, AVS, other information. Advance Care Planning   Healthcare Decision Maker:    Primary Decision Maker: Iwona Churchill - Child - [phone]    Transitions of Care Initial Call    Was this an external facility discharge? No Discharge Facility:     Challenges to be reviewed by the provider   Additional needs identified to be addressed with provider: No  none             Method of communication with provider : none    Advance Care Planning:   Does patient have an Advance Directive: not on file; education provided. Care Transition Nurse contacted the patient by telephone to perform post hospital discharge assessment. Verified name and  with patient as identifiers. Provided introduction to self, and explanation of the CTN role. CTN reviewed discharge instructions, medical action plan and red flags with patient who verbalized understanding. Patient given an opportunity to ask questions and does not have any further questions or concerns at this time. Were discharge instructions available to patient? Yes.  Reviewed appropriate site of care based on symptoms and resources

## 2022-09-25 LAB
BLOOD CULTURE, ROUTINE: NORMAL
CULTURE, BLOOD 2: NORMAL

## 2022-09-30 ENCOUNTER — HOSPITAL ENCOUNTER (OUTPATIENT)
Age: 67
Setting detail: OBSERVATION
Discharge: HOME | End: 2022-10-01
Attending: HOSPITALIST
Payer: MEDICARE

## 2022-09-30 PROBLEM — I47.1 MULTIFOCAL ATRIAL TACHYCARDIA (HCC): Status: ACTIVE | Noted: 2022-09-30

## 2022-09-30 PROBLEM — E11.9 DIABETES (HCC): Status: ACTIVE | Noted: 2021-10-28

## 2022-09-30 PROBLEM — I49.9 ARRHYTHMIA: Status: ACTIVE | Noted: 2022-09-30

## 2022-09-30 PROBLEM — I47.19 MULTIFOCAL ATRIAL TACHYCARDIA: Status: ACTIVE | Noted: 2022-09-30

## 2022-09-30 LAB
ALBUMIN SERPL-MCNC: 4.3 G/DL (ref 3.5–5.2)
ALP BLD-CCNC: 126 U/L (ref 40–130)
ALT SERPL-CCNC: 34 U/L (ref 5–41)
ANION GAP SERPL CALCULATED.3IONS-SCNC: 14 MMOL/L (ref 7–19)
AST SERPL-CCNC: 30 U/L (ref 5–40)
BILIRUB SERPL-MCNC: 0.3 MG/DL (ref 0.2–1.2)
BUN BLDV-MCNC: 44 MG/DL (ref 8–23)
CALCIUM SERPL-MCNC: 9.2 MG/DL (ref 8.8–10.2)
CHLORIDE BLD-SCNC: 92 MMOL/L (ref 98–111)
CO2: 32 MMOL/L (ref 22–29)
CREAT SERPL-MCNC: 5.6 MG/DL (ref 0.5–1.2)
D DIMER: 1.1 UG/ML FEU (ref 0–0.48)
GFR AFRICAN AMERICAN: 12
GFR NON-AFRICAN AMERICAN: 10
GLUCOSE BLD-MCNC: 237 MG/DL (ref 70–99)
GLUCOSE BLD-MCNC: 240 MG/DL (ref 74–109)
GLUCOSE BLD-MCNC: 322 MG/DL (ref 70–99)
HCT VFR BLD CALC: 35.5 % (ref 42–52)
HEMOGLOBIN: 10.7 G/DL (ref 14–18)
LACTIC ACID: 1.7 MMOL/L (ref 0.5–1.9)
MAGNESIUM: 2.1 MG/DL (ref 1.6–2.4)
MCH RBC QN AUTO: 28.8 PG (ref 27–31)
MCHC RBC AUTO-ENTMCNC: 30.1 G/DL (ref 33–37)
MCV RBC AUTO: 95.7 FL (ref 80–94)
PDW BLD-RTO: 18.8 % (ref 11.5–14.5)
PERFORMED ON: ABNORMAL
PERFORMED ON: ABNORMAL
PLATELET # BLD: 123 K/UL (ref 130–400)
PMV BLD AUTO: 9.2 FL (ref 9.4–12.4)
POTASSIUM SERPL-SCNC: 4.1 MMOL/L (ref 3.5–5)
PRO-BNP: 9389 PG/ML (ref 0–900)
RBC # BLD: 3.71 M/UL (ref 4.7–6.1)
SODIUM BLD-SCNC: 138 MMOL/L (ref 136–145)
TOTAL PROTEIN: 8 G/DL (ref 6.6–8.7)
TROPONIN: 0.06 NG/ML (ref 0–0.03)
TROPONIN: 0.07 NG/ML (ref 0–0.03)
TSH SERPL DL<=0.05 MIU/L-ACNC: 2.79 UIU/ML (ref 0.27–4.2)
VITAMIN D 25-HYDROXY: >100 NG/ML
WBC # BLD: 17.8 K/UL (ref 4.8–10.8)

## 2022-09-30 PROCEDURE — 80053 COMPREHEN METABOLIC PANEL: CPT

## 2022-09-30 PROCEDURE — 83735 ASSAY OF MAGNESIUM: CPT

## 2022-09-30 PROCEDURE — 6360000002 HC RX W HCPCS: Performed by: NURSE PRACTITIONER

## 2022-09-30 PROCEDURE — 84443 ASSAY THYROID STIM HORMONE: CPT

## 2022-09-30 PROCEDURE — 83605 ASSAY OF LACTIC ACID: CPT

## 2022-09-30 PROCEDURE — 84484 ASSAY OF TROPONIN QUANT: CPT

## 2022-09-30 PROCEDURE — 2580000003 HC RX 258: Performed by: HOSPITALIST

## 2022-09-30 PROCEDURE — 36415 COLL VENOUS BLD VENIPUNCTURE: CPT

## 2022-09-30 PROCEDURE — 93005 ELECTROCARDIOGRAM TRACING: CPT | Performed by: NURSE PRACTITIONER

## 2022-09-30 PROCEDURE — 83880 ASSAY OF NATRIURETIC PEPTIDE: CPT

## 2022-09-30 PROCEDURE — 82306 VITAMIN D 25 HYDROXY: CPT

## 2022-09-30 PROCEDURE — 85379 FIBRIN DEGRADATION QUANT: CPT

## 2022-09-30 PROCEDURE — 82947 ASSAY GLUCOSE BLOOD QUANT: CPT

## 2022-09-30 PROCEDURE — 85027 COMPLETE CBC AUTOMATED: CPT

## 2022-09-30 PROCEDURE — 96372 THER/PROPH/DIAG INJ SC/IM: CPT

## 2022-09-30 PROCEDURE — G0379 DIRECT REFER HOSPITAL OBSERV: HCPCS

## 2022-09-30 PROCEDURE — G0378 HOSPITAL OBSERVATION PER HR: HCPCS

## 2022-09-30 PROCEDURE — 6370000000 HC RX 637 (ALT 250 FOR IP): Performed by: NURSE PRACTITIONER

## 2022-09-30 RX ORDER — MELATONIN 10 MG
10 CAPSULE ORAL NIGHTLY PRN
Status: DISCONTINUED | OUTPATIENT
Start: 2022-09-30 | End: 2022-10-01 | Stop reason: HOSPADM

## 2022-09-30 RX ORDER — ACETAMINOPHEN 650 MG/1
650 SUPPOSITORY RECTAL EVERY 6 HOURS PRN
Status: DISCONTINUED | OUTPATIENT
Start: 2022-09-30 | End: 2022-09-30 | Stop reason: ALTCHOICE

## 2022-09-30 RX ORDER — ACETAMINOPHEN 325 MG/1
650 TABLET ORAL EVERY 6 HOURS PRN
Status: DISCONTINUED | OUTPATIENT
Start: 2022-09-30 | End: 2022-09-30 | Stop reason: ALTCHOICE

## 2022-09-30 RX ORDER — ASPIRIN 81 MG/1
81 TABLET ORAL DAILY
Status: DISCONTINUED | OUTPATIENT
Start: 2022-09-30 | End: 2022-10-01 | Stop reason: HOSPADM

## 2022-09-30 RX ORDER — ENOXAPARIN SODIUM 100 MG/ML
30 INJECTION SUBCUTANEOUS DAILY
Status: DISCONTINUED | OUTPATIENT
Start: 2022-09-30 | End: 2022-09-30 | Stop reason: SDUPTHER

## 2022-09-30 RX ORDER — INSULIN GLARGINE 100 [IU]/ML
11 INJECTION, SOLUTION SUBCUTANEOUS NIGHTLY
Status: DISCONTINUED | OUTPATIENT
Start: 2022-09-30 | End: 2022-10-01 | Stop reason: HOSPADM

## 2022-09-30 RX ORDER — POLYETHYLENE GLYCOL 3350 17 G/17G
17 POWDER, FOR SOLUTION ORAL DAILY PRN
Status: DISCONTINUED | OUTPATIENT
Start: 2022-09-30 | End: 2022-10-01 | Stop reason: HOSPADM

## 2022-09-30 RX ORDER — SODIUM CHLORIDE 0.9 % (FLUSH) 0.9 %
5-40 SYRINGE (ML) INJECTION PRN
Status: DISCONTINUED | OUTPATIENT
Start: 2022-09-30 | End: 2022-10-01 | Stop reason: HOSPADM

## 2022-09-30 RX ORDER — ONDANSETRON 2 MG/ML
4 INJECTION INTRAMUSCULAR; INTRAVENOUS EVERY 6 HOURS PRN
Status: DISCONTINUED | OUTPATIENT
Start: 2022-09-30 | End: 2022-10-01 | Stop reason: HOSPADM

## 2022-09-30 RX ORDER — DEXTROSE MONOHYDRATE 100 MG/ML
INJECTION, SOLUTION INTRAVENOUS CONTINUOUS PRN
Status: DISCONTINUED | OUTPATIENT
Start: 2022-09-30 | End: 2022-10-01 | Stop reason: HOSPADM

## 2022-09-30 RX ORDER — ONDANSETRON 4 MG/1
4 TABLET, ORALLY DISINTEGRATING ORAL EVERY 8 HOURS PRN
Status: DISCONTINUED | OUTPATIENT
Start: 2022-09-30 | End: 2022-10-01 | Stop reason: HOSPADM

## 2022-09-30 RX ORDER — VITAMIN C
1 TAB ORAL DAILY
Status: DISCONTINUED | OUTPATIENT
Start: 2022-09-30 | End: 2022-10-01 | Stop reason: HOSPADM

## 2022-09-30 RX ORDER — METOCLOPRAMIDE 5 MG/1
5 TABLET ORAL 4 TIMES DAILY
Status: DISCONTINUED | OUTPATIENT
Start: 2022-09-30 | End: 2022-10-01 | Stop reason: HOSPADM

## 2022-09-30 RX ORDER — INSULIN LISPRO 100 [IU]/ML
0-4 INJECTION, SOLUTION INTRAVENOUS; SUBCUTANEOUS
Status: DISCONTINUED | OUTPATIENT
Start: 2022-09-30 | End: 2022-10-01 | Stop reason: HOSPADM

## 2022-09-30 RX ORDER — ATORVASTATIN CALCIUM 40 MG/1
80 TABLET, FILM COATED ORAL NIGHTLY
Status: DISCONTINUED | OUTPATIENT
Start: 2022-09-30 | End: 2022-10-01 | Stop reason: HOSPADM

## 2022-09-30 RX ORDER — SODIUM CHLORIDE 0.9 % (FLUSH) 0.9 %
5-40 SYRINGE (ML) INJECTION EVERY 12 HOURS SCHEDULED
Status: DISCONTINUED | OUTPATIENT
Start: 2022-09-30 | End: 2022-10-01 | Stop reason: HOSPADM

## 2022-09-30 RX ORDER — SEVELAMER CARBONATE 800 MG/1
800 TABLET, FILM COATED ORAL
Status: DISCONTINUED | OUTPATIENT
Start: 2022-09-30 | End: 2022-10-01 | Stop reason: HOSPADM

## 2022-09-30 RX ORDER — INSULIN ASPART 100 [IU]/ML
10 INJECTION, SUSPENSION SUBCUTANEOUS 2 TIMES DAILY WITH MEALS
Status: DISCONTINUED | OUTPATIENT
Start: 2022-09-30 | End: 2022-09-30 | Stop reason: CLARIF

## 2022-09-30 RX ORDER — SODIUM CHLORIDE 9 MG/ML
INJECTION, SOLUTION INTRAVENOUS PRN
Status: DISCONTINUED | OUTPATIENT
Start: 2022-09-30 | End: 2022-10-01 | Stop reason: HOSPADM

## 2022-09-30 RX ORDER — ACETAMINOPHEN 500 MG
500 TABLET ORAL EVERY 6 HOURS PRN
Status: DISCONTINUED | OUTPATIENT
Start: 2022-09-30 | End: 2022-10-01 | Stop reason: HOSPADM

## 2022-09-30 RX ORDER — HEPARIN SODIUM 5000 [USP'U]/ML
5000 INJECTION, SOLUTION INTRAVENOUS; SUBCUTANEOUS EVERY 8 HOURS SCHEDULED
Status: DISCONTINUED | OUTPATIENT
Start: 2022-09-30 | End: 2022-10-01 | Stop reason: HOSPADM

## 2022-09-30 RX ORDER — SODIUM CHLORIDE 9 MG/ML
INJECTION, SOLUTION INTRAVENOUS CONTINUOUS
Status: DISCONTINUED | OUTPATIENT
Start: 2022-09-30 | End: 2022-09-30

## 2022-09-30 RX ORDER — INSULIN LISPRO 100 [IU]/ML
0-4 INJECTION, SOLUTION INTRAVENOUS; SUBCUTANEOUS NIGHTLY
Status: DISCONTINUED | OUTPATIENT
Start: 2022-09-30 | End: 2022-10-01 | Stop reason: HOSPADM

## 2022-09-30 RX ORDER — INSULIN LISPRO 100 [IU]/ML
2 INJECTION, SOLUTION INTRAVENOUS; SUBCUTANEOUS
Status: DISCONTINUED | OUTPATIENT
Start: 2022-09-30 | End: 2022-10-01 | Stop reason: HOSPADM

## 2022-09-30 RX ADMIN — INSULIN GLARGINE 11 UNITS: 100 INJECTION, SOLUTION SUBCUTANEOUS at 20:15

## 2022-09-30 RX ADMIN — INSULIN LISPRO 1 UNITS: 100 INJECTION, SOLUTION INTRAVENOUS; SUBCUTANEOUS at 18:42

## 2022-09-30 RX ADMIN — SODIUM CHLORIDE, PRESERVATIVE FREE 10 ML: 5 INJECTION INTRAVENOUS at 20:17

## 2022-09-30 RX ADMIN — HEPARIN SODIUM 5000 UNITS: 5000 INJECTION INTRAVENOUS; SUBCUTANEOUS at 22:02

## 2022-09-30 RX ADMIN — SEVELAMER CARBONATE 800 MG: 800 TABLET, FILM COATED ORAL at 20:08

## 2022-09-30 RX ADMIN — Medication 10 MG: at 22:02

## 2022-09-30 RX ADMIN — INSULIN LISPRO 4 UNITS: 100 INJECTION, SOLUTION INTRAVENOUS; SUBCUTANEOUS at 20:16

## 2022-09-30 RX ADMIN — ASPIRIN 81 MG: 81 TABLET, COATED ORAL at 17:55

## 2022-09-30 RX ADMIN — Medication 1 TABLET: at 17:55

## 2022-09-30 RX ADMIN — ATORVASTATIN CALCIUM 80 MG: 40 TABLET, FILM COATED ORAL at 20:08

## 2022-09-30 RX ADMIN — METOCLOPRAMIDE 5 MG: 5 TABLET ORAL at 20:08

## 2022-09-30 ASSESSMENT — ENCOUNTER SYMPTOMS
NAUSEA: 0
SHORTNESS OF BREATH: 0

## 2022-09-30 ASSESSMENT — PAIN SCALES - GENERAL: PAINLEVEL_OUTOF10: 0

## 2022-09-30 NOTE — PROGRESS NOTES
Notified attending Dr. Anthony Mckinley regarding pt's arrival, okay to have diet order per physician.

## 2022-09-30 NOTE — H&P
[facility]      Hospitalist - History & Physical      PCP: Dimitry Perla MD    Date of Admission: 9/30/2022    Date of Service: 9/30/2022    Chief Complaint: EKG      History Of Present Illness: The patient is a 79 y.o. male who presented to [facility] as a transfer from [facility] ED for evaluation of abnormal EKG. Pt has history of ESRD on hemodialysis, diabetes, HTN and hyperlipidemia. He was discharged from this facility 11 days ago for evaluation of near syncope with episode of atrial fibrillation at dialysis that had resolved on admission. He was discharged with Zio patch. Today during hemodialysis he had noted rapid irregular heart rate sent to outlying ED for evaluation. Pt denies having had chest pain, shortness of breath as well as syncope. He had abnormal ekg there with what appeared to be multifocal atrial tachycardia with hr of 111b/min. On admission, patient was note to be in sinus rhythm/ST with hr of 104b/min with occasional PVC.    Past Medical History:        Diagnosis Date    Arthritis     Chronic kidney disease     CKD (chronic kidney disease) stage V requiring chronic dialysis ([facility] [address] 12/19/2019    COVID-19 08/2021    Diabetes mellitus ([facility] [address]     Diabetic retinopathy ([facility] [address]     Digestive disorder     \" slow digestion \"    Hemodialysis patient ([facility] [address]     dialysis on mon, wed, and friday at [facility]    Hyperlipemia     Hypertension     Legally blind     Neuropathy     feet       Past Surgical History:        Procedure Laterality Date    CATARACT REMOVAL      DIALYSIS FISTULA CREATION Left 2/4/2020    LIGATION OF LEFT RADIAL ARTERY TO LEFT CEPHALIC VEIN HEMODIALYSIS FISTULA; CREATION OF LEFT BRACHIAL ARTERY TO LEFT CEPHALIC VEIN PRIMARY ARTERIO-VENOUS FISTULA performed by Deandra Garcia MD at [facility] 2/4/2020    INSERTION OF RIGHT INTERNAL JUGULAR HEMODIALYSIS CATHETER performed by Deandra Garcia MD at [facility] 7/2/2018    ARM RADICAL/ CEPHALIC AV FISTULA performed by Dirk Cisneros MD at [address] [place] [address]  12/13/2018    SJS. Left upper fistulograms, BA distal forearm cephalic vein 8H43 cutter,coil branch (5x3(2)    VASCULAR SURGERY  03/28/2019    SJS. Left upper fistulograms/venograms. VASCULAR SURGERY  02/04/2020    SJS Left brachial artery to Cephalic Vein Arteriovenous Fistula Creation, Ligation of distal forearm cephallic vein near the radial cephallic arteriovenous anastomosis, Ultrasound-guided cannulation of right internal jugular vein a placement of right internal jugular vein tunneled dialysis catheter (glidepath 23 cm tip to cuff)    VASCULAR SURGERY  02/04/2020    SJS  Left brachial artery Cephalic Vein Arteriovenous Fistula Creation, Ligation of distal forearm cephalic vein near the radial cephalic arteriovenous anastomosis, Ultrasound-guided cannulation of right internal juhular vein and placement of right internal jugular vein tunneled dialysis catheter (glidepath 23 cm tip to cuff)     VASCULAR SURGERY  04/02/2020    SJS removal of tunneled dialysis catheter right internal jugular vein       Home Medications:  Prior to Admission medications    Medication Sig Start Date End Date Taking?  Authorizing Provider   b complex vitamins capsule Take 1 capsule by mouth daily    Historical Provider, MD   ferric citrate (AURYXIA) 1  MG(Fe) TABS tablet Take by mouth 3 times daily (with meals)    Historical Provider, MD   vitamin C (ASCORBIC ACID) 500 MG tablet Take 1,000 mg by mouth daily  9/20/22  Historical Provider, MD   atorvastatin (LIPITOR) 80 MG tablet Take 80 mg by mouth nightly    Historical Provider, MD   diphenhydrAMINE (BENADRYL) 50 MG capsule Take 100 mg by mouth every 6 hours as needed for Itching    Historical Provider, MD   midodrine (PROAMATINE) 5 MG tablet Take 10 mg by mouth as needed DURING DIALYSIS IF NEEDED    Historical Provider, MD   sevelamer (RENVELA) 800 MG tablet Take 1 tablet by mouth Daily with supper    Historical Provider, MD   Ergocalciferol (VITAMIN D2 PO) Take 1,250 mcg by mouth once a week Takes on Wednesday    Historical Provider, MD   insulin aspart protamine-insulin aspart (NOVOLOG 70/30) (70-30) 100 UNIT/ML injection Inject 10 Units into the skin 2 times daily (with meals) 10 - 25 UNITS ONCE OR TWICE A DAY    Historical Provider, MD   melatonin 3 MG TABS tablet Take 10 mg by mouth nightly as needed     Historical Provider, MD   acetaminophen (TYLENOL) 500 MG tablet Take 500 mg by mouth every 6 hours as needed for Pain    Historical Provider, MD   metoclopramide (REGLAN) 5 MG tablet Take 5 mg by mouth 4 times daily 5/29/18   Historical Provider, MD   aspirin 81 MG tablet Take 81 mg by mouth daily    Historical Provider, MD   fenofibrate 160 MG tablet Take 160 mg by mouth at bedtime 5/6/18 9/20/22  Historical Provider, MD       Allergies:    Patient has no known allergies. Social History:    The patient currently lives at home  Tobacco:   reports that he has never smoked. He has never used smokeless tobacco.  Alcohol:   reports no history of alcohol use. Illicit Drugs: denies    Family History:      Problem Relation Age of Onset    Cancer Mother     Other Mother         COPD    Other Father         tractor accident       Review of Systems:   Review of Systems   Constitutional:  Negative for activity change. Respiratory:  Negative for shortness of breath. Cardiovascular:  Negative for chest pain. Gastrointestinal:  Negative for nausea. Neurological:  Negative for syncope. All other systems reviewed and are negative. 14 point review of systems is negative except as specifically addressed above. Physical Examination:  BP (!) 109/54   Pulse 91   Temp 97 °F (36.1 °C) (Temporal)   Resp 18   Ht 5' 11\" (1.803 m)   Wt 177 lb (80.3 kg)   SpO2 98%   BMI 24.69 kg/m²   Physical Exam  Vitals reviewed. Constitutional:       Appearance: Normal appearance. Comments: Pt sitting in recliner appears in no distress   HENT:      Right Ear: External ear normal.      Left Ear: External ear normal.      Mouth/Throat:      Pharynx: Oropharynx is clear. Eyes:      Conjunctiva/sclera: Conjunctivae normal.   Cardiovascular:      Rate and Rhythm: Regular rhythm. Tachycardia present. Pulmonary:      Effort: Pulmonary effort is normal.      Comments: Decreased breath sounds throughout  Abdominal:      Tenderness: There is no abdominal tenderness. Musculoskeletal:      Right lower leg: No edema. Left lower leg: No edema. Skin:     General: Skin is warm and dry. Neurological:      Mental Status: He is alert and oriented to person, place, and time. Conclusions      Summary   Mitral valve leaflets are mildly thickened with preserved leaflet   mobility. Mild to moderate mitral regurgitation is present. Mildly thickened aortic valve leaflets with preserved leaflet mobility. Tricuspid valve is structurally normal.   trivial tricuspid regurgitation. Mildly dilated left atrium. Normal left ventricular size with preserved LV function and an estimated   ejection fraction of approximately 55-60%. Normal left ventricular wall thickness. No regional wall motion abnormalities. Signature      ----------------------------------------------------------------   Electronically signed by Kaveh Banegas MD(Interpreting   physician) on 09/20/2022 02:43 PM   ----------------------------------------------------------------    Assessment/Plan:  Principal Problem:    Arrhythmia  Active Problems:    Multifocal atrial tachycardia (HCC)    ESRD on hemodialysis ([facility] [address]    Diabetes ([facility] [address]  Resolved Problems:    * No resolved hospital problems.  *     Principal Problem:    Arrhythmia/Multifocal atrial tachycardia/Paroxysmal afib   -consider adding beta blocker   -telemetry   -tsh   -trend troponin   -asa 81mg daily   -lipitor 80mg daily    -magnesium   -lactic acid   -vit [id number]   -fasting lipid panel  Active Problems:    ESRD on hemodialysis ([facility] [address]   -consult nephrology   -follow lytes   -I's and O's   -daily weight    Diabetes ([facility] [address]   -HgA1c  -poc glucose qid  -low dose insulin coverage  -hypoglycemia orders  Resolved Problems:    * No resolved hospital problems. *  Signed:  Phoebe Leventhal, APRN - CNP, 9/30/2022 6:31 PM      Attestation Statement     I have independently seen and examined this patient and agree with the asesment and plan by mid level provider                                                         Objective:   Vitals: BP (!) 141/81   Pulse (!) 108   Temp 96.8 °F (36 °C) (Temporal)   Resp 18   Ht 5' 11\" (1.803 m)   Wt 176 lb 3.2 oz (79.9 kg)   SpO2 95%   BMI 24.57 kg/m²   General appearance: alert, appears stated age and cooperative  Skin: Skin color, texture, turgor normal.   HEENT: Head: Normocephalic, no lesions, without obvious abnormality.   Neck: no adenopathy, no carotid bruit, no JVD, and supple, symmetrical, trachea midline  Lungs: clear to auscultation bilaterally  Heart: irregularly irregular rhythm  Abdomen: soft, non-tender; bowel sounds normal; no masses,  no organomegaly  Extremities: extremities normal, atraumatic, no cyanosis or edema  Lymphatic: No significant lymph node enlargement papable  Neurologic: Mental status: Alert, oriented, thought content appropriate      Assessment & Plan:    EKG changes- cardiology consult  ESRD- on HD  DM2- insulin   HTN  PAF        Iveth Worley MD

## 2022-09-30 NOTE — PROGRESS NOTES
Verified with Dr. Jovani Yañez, pt is on dialysis. Will hold continuous NS infusion 125 ml/hr at this time.

## 2022-09-30 NOTE — PROGRESS NOTES
Lindsey Romero arrived to room # [phone]. Presented with: AFIB  Mental Status: Patient is oriented, alert, coherent, logical, thought processes intact, and able to concentrate and follow conversation. Vitals:    09/30/22 1517   BP: (!) 137/96   Pulse: (!) 102   Resp: 20   Temp: (!) 96.7 °F (35.9 °C)   SpO2: 97%     Patient safety contract and falls prevention contract reviewed with patient Yes. Oriented Patient to room. Call light within reach. Yes.   Needs, issues or concerns expressed at this time: no.      Electronically signed by Aramis Lundy RN on 9/30/2022 at 3:49 PM

## 2022-09-30 NOTE — PROGRESS NOTES
Automatic Dose Adjustment of                Subcutaneous Anticoagulant for Prophylaxis    Vivian Esparza is a 79 y.o. male. No results for input(s): CREATININE in the last 72 hours. Estimated Creatinine Clearance: 12 mL/min (A) (based on SCr of 6.4 mg/dL (H)). Weight:  Wt Readings from Last 1 Encounters:   09/30/22 177 lb (80.3 kg)           Pharmacy has adjusted subcutaneous anticoagulant for prophylaxis to Heparin 5000 units SC three times daily based on the patient's weight and estimated CrCl per [facility] policy.                Electronically signed by Gonzalo Mosher, [facility] on 9/30/2022 at 4:37 PM

## 2022-10-01 VITALS
WEIGHT: 176.2 LBS | OXYGEN SATURATION: 95 % | HEIGHT: 71 IN | TEMPERATURE: 96.8 F | DIASTOLIC BLOOD PRESSURE: 81 MMHG | SYSTOLIC BLOOD PRESSURE: 141 MMHG | BODY MASS INDEX: 24.67 KG/M2 | RESPIRATION RATE: 18 BRPM | HEART RATE: 108 BPM

## 2022-10-01 LAB
ANION GAP SERPL CALCULATED.3IONS-SCNC: 15 MMOL/L (ref 7–19)
BUN BLDV-MCNC: 54 MG/DL (ref 8–23)
CALCIUM SERPL-MCNC: 9.1 MG/DL (ref 8.8–10.2)
CHLORIDE BLD-SCNC: 94 MMOL/L (ref 98–111)
CHOLESTEROL, FASTING: 167 MG/DL (ref 160–199)
CO2: 32 MMOL/L (ref 22–29)
CREAT SERPL-MCNC: 6.4 MG/DL (ref 0.5–1.2)
GFR AFRICAN AMERICAN: 11
GFR NON-AFRICAN AMERICAN: 9
GLUCOSE BLD-MCNC: 161 MG/DL (ref 70–99)
GLUCOSE BLD-MCNC: 168 MG/DL (ref 74–109)
GLUCOSE BLD-MCNC: 207 MG/DL (ref 70–99)
HBA1C MFR BLD: 5.9 % (ref 4–6)
HCT VFR BLD CALC: 31 % (ref 42–52)
HDLC SERPL-MCNC: 48 MG/DL (ref 55–121)
HEMOGLOBIN: 9.3 G/DL (ref 14–18)
LDL CHOLESTEROL CALCULATED: 77 MG/DL
MCH RBC QN AUTO: 28.6 PG (ref 27–31)
MCHC RBC AUTO-ENTMCNC: 30 G/DL (ref 33–37)
MCV RBC AUTO: 95.4 FL (ref 80–94)
PDW BLD-RTO: 18.7 % (ref 11.5–14.5)
PERFORMED ON: ABNORMAL
PERFORMED ON: ABNORMAL
PLATELET # BLD: 114 K/UL (ref 130–400)
PMV BLD AUTO: 9.8 FL (ref 9.4–12.4)
POTASSIUM REFLEX MAGNESIUM: 4.5 MMOL/L (ref 3.5–5)
RBC # BLD: 3.25 M/UL (ref 4.7–6.1)
SODIUM BLD-SCNC: 141 MMOL/L (ref 136–145)
TRIGLYCERIDE, FASTING: 211 MG/DL (ref 0–149)
TROPONIN: 0.06 NG/ML (ref 0–0.03)
TROPONIN: 0.06 NG/ML (ref 0–0.03)
WBC # BLD: 12.8 K/UL (ref 4.8–10.8)

## 2022-10-01 PROCEDURE — 84484 ASSAY OF TROPONIN QUANT: CPT

## 2022-10-01 PROCEDURE — 82947 ASSAY GLUCOSE BLOOD QUANT: CPT

## 2022-10-01 PROCEDURE — 36415 COLL VENOUS BLD VENIPUNCTURE: CPT

## 2022-10-01 PROCEDURE — 6370000000 HC RX 637 (ALT 250 FOR IP): Performed by: NURSE PRACTITIONER

## 2022-10-01 PROCEDURE — G0378 HOSPITAL OBSERVATION PER HR: HCPCS

## 2022-10-01 PROCEDURE — 6370000000 HC RX 637 (ALT 250 FOR IP): Performed by: STUDENT IN AN ORGANIZED HEALTH CARE EDUCATION/TRAINING PROGRAM

## 2022-10-01 PROCEDURE — 6360000002 HC RX W HCPCS: Performed by: NURSE PRACTITIONER

## 2022-10-01 PROCEDURE — 2580000003 HC RX 258: Performed by: HOSPITALIST

## 2022-10-01 PROCEDURE — 80061 LIPID PANEL: CPT

## 2022-10-01 PROCEDURE — 80048 BASIC METABOLIC PNL TOTAL CA: CPT

## 2022-10-01 PROCEDURE — 96372 THER/PROPH/DIAG INJ SC/IM: CPT

## 2022-10-01 PROCEDURE — 83036 HEMOGLOBIN GLYCOSYLATED A1C: CPT

## 2022-10-01 PROCEDURE — 85027 COMPLETE CBC AUTOMATED: CPT

## 2022-10-01 RX ORDER — METOPROLOL SUCCINATE 50 MG/1
50 TABLET, EXTENDED RELEASE ORAL DAILY
Qty: 30 TABLET | Refills: 0 | Status: SHIPPED | OUTPATIENT
Start: 2022-10-01

## 2022-10-01 RX ADMIN — ACETAMINOPHEN 500 MG: 500 TABLET ORAL at 06:55

## 2022-10-01 RX ADMIN — SODIUM CHLORIDE, PRESERVATIVE FREE 10 ML: 5 INJECTION INTRAVENOUS at 08:32

## 2022-10-01 RX ADMIN — HEPARIN SODIUM 5000 UNITS: 5000 INJECTION INTRAVENOUS; SUBCUTANEOUS at 06:12

## 2022-10-01 RX ADMIN — METOCLOPRAMIDE 5 MG: 5 TABLET ORAL at 12:15

## 2022-10-01 RX ADMIN — METOCLOPRAMIDE 5 MG: 5 TABLET ORAL at 08:32

## 2022-10-01 RX ADMIN — INSULIN LISPRO 2 UNITS: 100 INJECTION, SOLUTION INTRAVENOUS; SUBCUTANEOUS at 12:18

## 2022-10-01 RX ADMIN — METOPROLOL TARTRATE 25 MG: 25 TABLET, FILM COATED ORAL at 09:30

## 2022-10-01 RX ADMIN — Medication 1 TABLET: at 08:32

## 2022-10-01 RX ADMIN — INSULIN LISPRO 1 UNITS: 100 INJECTION, SOLUTION INTRAVENOUS; SUBCUTANEOUS at 12:16

## 2022-10-01 RX ADMIN — ASPIRIN 81 MG: 81 TABLET, COATED ORAL at 08:32

## 2022-10-01 RX ADMIN — INSULIN LISPRO 2 UNITS: 100 INJECTION, SOLUTION INTRAVENOUS; SUBCUTANEOUS at 08:32

## 2022-10-01 ASSESSMENT — PAIN - FUNCTIONAL ASSESSMENT: PAIN_FUNCTIONAL_ASSESSMENT: ACTIVITIES ARE NOT PREVENTED

## 2022-10-01 ASSESSMENT — PAIN DESCRIPTION - DESCRIPTORS: DESCRIPTORS: ACHING

## 2022-10-01 ASSESSMENT — PAIN DESCRIPTION - ORIENTATION: ORIENTATION: MID

## 2022-10-01 ASSESSMENT — PAIN SCALES - GENERAL
PAINLEVEL_OUTOF10: 7
PAINLEVEL_OUTOF10: 0
PAINLEVEL_OUTOF10: 0

## 2022-10-01 ASSESSMENT — PAIN DESCRIPTION - LOCATION: LOCATION: HEAD

## 2022-10-01 NOTE — PROGRESS NOTES
Patient is stable for discharge from nephrology standpoint per Dr. Jian Diggs.  Electronically signed by Sergio Torres RN on 10/1/2022 at 10:21 AM

## 2022-10-01 NOTE — CONSULTS
Renal Consult Note    Thank you to requesting provider: Dr Richelle Lugo, for asking us to see Vivian     Reason for consultation: ESRD    Chief Complaint:   Arrhythmia    History of Presenting Illness    Patient is a 79 y.o. male who is a transfer from [facility] ED for evaluation of abnormal EKG. Pt has history of ESRD on hemodialysis, diabetes, HTN. He was discharged from [facility]) 11 days ago after near syncope and atrial fibrillation at dialysis. Isabel Bustamanteuser He comes now after experiencing palpations and arrhythmia toward end of dialysis. He denied chest pain, dyspnea, dizziness, or syncope. He had abnormal  EKG at [facility] ED (multifocal atrial tachycardia). On admission, patient was note to be in sinus rhythm with occasional PVCs. Renal service was consulted to manage his ESRD. He offered no new complaints. He is feeling at baseline.        Past Medical/Surgical History      Active Ambulatory Problems     Diagnosis Date Noted    Dialysis AV fistula malfunction, subsequent encounter 12/13/2018    ESRD on hemodialysis ([address] 12/19/2019    Diabetes ([address] 10/28/2021    Benign essential HTN 10/28/2021    Hyperlipidemia 10/28/2021    Near syncope 09/19/2022     Resolved Ambulatory Problems     Diagnosis Date Noted    Chronic kidney disease (CKD), stage IV (severe) ([address] 06/29/2018     Past Medical History:   Diagnosis Date    Arthritis     Chronic kidney disease     CKD (chronic kidney disease) stage V requiring chronic dialysis ([address] 12/19/2019    COVID-19 08/2021    Diabetes mellitus ([address]     Diabetic retinopathy ([address]     Digestive disorder     Hemodialysis patient ([address]     Hyperlipemia     Hypertension     Legally blind     Neuropathy          Review of Systems     Constitutional:  No weight loss, no fever/chills  Eyes:  No eye pain, no eye redness  Cardiovascular:  No chest pain, no worsening of edema  Respiratory:  No hemoptysis, no stidor  Gastrointestinal:  No blood in stool, no n/v, no diarrhea  Genitoruinary:  No hematuria, no difficulty with urination  Musculoskeletal:  No joint swelling, no redness  Integumentary:  No Rash, no itching  Neurological:  No focal weakness, No new sensory deficit  Psychiatric:  No depression, no confusion  Endocrine:  No polyuria, no polydipsia       Medications        Current Facility-Administered Medications:     metoprolol tartrate (LOPRESSOR) tablet 25 mg, 25 mg, Oral, BID, Juani Ortiz MD, 25 mg at 10/01/22 0930    sodium chloride flush 0.9 % injection 5-40 mL, 5-40 mL, IntraVENous, 2 times per day, Dominick Claudio MD, 10 mL at 10/01/22 0630    sodium chloride flush 0.9 % injection 5-40 mL, 5-40 mL, IntraVENous, PRN, Dominick Claudio MD    0.9 % sodium chloride infusion, , IntraVENous, PRN, Dominick Claudio MD    ondansetron (ZOFRAN-ODT) disintegrating tablet 4 mg, 4 mg, Oral, Q8H PRN **OR** ondansetron (ZOFRAN) injection 4 mg, 4 mg, IntraVENous, Q6H PRN, Dominick Claudio MD    polyethylene glycol (GLYCOLAX) packet 17 g, 17 g, Oral, Daily PRN, Dominick Claudio MD    heparin (porcine) injection 5,000 Units, 5,000 Units, SubCUTAneous, 3 times per day, CALIXTO Hurst - CNP, 5,000 Units at 10/01/22 1443    aspirin EC tablet 81 mg, 81 mg, Oral, Daily, CALIXTO Hurst - CNP, 81 mg at 10/01/22 4205    atorvastatin (LIPITOR) tablet 80 mg, 80 mg, Oral, Nightly, Bertin Root APRN - CNP, 80 mg at 09/30/22 2008    vitamin B and C (TOTAL B-C) 1 tablet, 1 tablet, Oral, Daily, CALIXTO Hurst - CNP, 1 tablet at 10/01/22 8695    acetaminophen (TYLENOL) tablet 500 mg, 500 mg, Oral, Q6H PRN, CALIXTO Hurst - CNP, 500 mg at 10/01/22 0655    insulin lispro (HUMALOG) injection vial 0-4 Units, 0-4 Units, SubCUTAneous, TID WC, CALIXTO Hurst CNP, 1 Units at 09/30/22 1842    insulin lispro (HUMALOG) injection vial 0-4 Units, 0-4 Units, SubCUTAneous, Nightly, CALIXTO Hurst CNP, 4 Units at 09/30/22 2016    glucose respiratory effort, good air movement  CV:  RRR, soft systolic murmur  Abdomen:  NTND, soft, +BS, no hepatosplenomegaly  Extremities:  No peripheral edema  Neurological:  Moving all four extremities   Lymphatics:  No palpable lymph nodes   Skin:  No rash, no jaundice  Psychiatric:  Normal insight and judgement, good recall    Data     Recent Labs     09/30/22  1622 10/01/22  0137   WBC 17.8* 12.8*   HGB 10.7* 9.3*   HCT 35.5* 31.0*   MCV 95.7* 95.4*   * 114*     Recent Labs     09/30/22  1622 10/01/22  0137    141   K 4.1 4.5   CL 92* 94*   CO2 32* 32*   GLUCOSE 240* 168*   MG 2.1  --    BUN 44* 54*   CREATININE 5.6* 6.4*   LABGLOM 10* 9*   GFRAA 12* 11*       Assessment:  ESRD  Type 2 DM with renal disease  HTN  Paroxysmal atrial fibrillation  Anemia of CKD. Plan:  Patient's labs appeared at baseline. No changes form renal standpoint. Continue HD thrice weekly (MWF). If discharged, follow up at the dialysis unit on 10/3. Agree with beta blockers dose adjustment.      Thank you for asking us to participate in the management of your patient, please do not hesitate to contact me for any concerns regarding my recommendations as outlined above.    -----------------------------  Electronically signed by Adonis Haywood MD on 10/1/22 at 10:32 AM CRISTINA

## 2022-10-01 NOTE — DISCHARGE SUMMARY
[place] [address]    DEPARTMENT OF HOSPITALIST MEDICINE      DISCHARGE SUMMARY:      PATIENT NAME:  Kaylyn Patel  :  1955  MRN:  [id number]    Admission Date:   2022  3:22 PM Attending: Nate Orozco MD   Discharge Date:   10/1/2022              PCP: Irina Grove MD  Length of Stay: 0 days     Chief Complaint on Admission: No chief complaint on file. Consultants:     IP CONSULT TO NEPHROLOGY       Discharge Problem List:   Principal Problem:    Arrhythmia  Active Problems:    ESRD on hemodialysis ([facility]    Diabetes ([facility]  Resolved Problems:    * No resolved hospital problems. *         Last dated Assessment and Plan . .. 22  Principal Problem:    Arrhythmia/Multifocal atrial tachycardia/Paroxysmal afib              -consider adding beta blocker              -telemetry              -tsh              -trend troponin              -asa 81mg daily              -lipitor 80mg daily               -magnesium              -lactic acid              -vit [id number]              -fasting lipid panel  Active Problems:    ESRD on hemodialysis ([facility]              -consult nephrology              -follow lytes              -I's and O's              -daily weight    Diabetes (HCC)              -HgA1c  -poc glucose qid  -low dose insulin coverage  -hypoglycemia orders    [address]  AND  TREATMENT:  80-year-old male with ESRD on HD, DM, and HTN who presented as a transfer from [facility] for tachycardia during HD and abnormal EKG. EKG at OSH reportedly showed multifocal atrial tachycardia with heart rate of 111. Patient was in sinus rhythm on arrival.  Patient asymptomatic without any chest pain, palpitations, or presyncopal episodes. Troponin flat at 0.06 x3. He was started on metoprolol and discharged home with Zio patch still in place.     OBJECTIVE:  BP (!) 141/81   Pulse (!) 108   Temp 96.8 °F (36 °C) (Temporal)   Resp 18   Ht 5' 11\" (1.803 m)   Wt 176 lb 3.2 oz (79.9 kg)   SpO2 95%   BMI 24.57 kg/m²       Heart: RRR   Lungs: Bilateral fair air entry   Abdomen: Soft, non-tender   Extremities: No edema   Neurologic: Alert and oriented   Skin: Warm and dry          Laboratory Data:  Recent Labs     09/30/22  1622 10/01/22  0137   WBC 17.8* 12.8*   HGB 10.7* 9.3*   * 114*     Recent Labs     09/30/22  1622 10/01/22  0137    141   K 4.1 4.5   CL 92* 94*   CO2 32* 32*   BUN 44* 54*   CREATININE 5.6* 6.4*   GLUCOSE 240* 168*     Recent Labs     09/30/22  1622   AST 30   ALT 34   BILITOT 0.3   ALKPHOS 126     Troponin T:   Recent Labs     09/30/22  2133 10/01/22  0137 10/01/22  0630   TROPONINI 0.06* 0.06* 0.06*     Pro-BNP: No results for input(s): BNP in the last 72 hours. INR: No results for input(s): INR in the last 72 hours. UA:No results for input(s): NITRITE, COLORU, PHUR, LABCAST, WBCUA, RBCUA, MUCUS, TRICHOMONAS, YEAST, BACTERIA, CLARITYU, SPECGRAV, LEUKOCYTESUR, UROBILINOGEN, BILIRUBINUR, BLOODU, GLUCOSEU, AMORPHOUS in the last 72 hours. Invalid input(s): KETONESU  A1C:   Recent Labs     10/01/22  0137   LABA1C 5.9     ABG:No results for input(s): PHART, HBY1BPV, PO2ART, ZNQ5NNC, BEART, HGBAE, W2HGFLAR, CARBOXHGBART in the last 72 hours. Impressions of imaging performed in 48 hours before discharge:    No results found.         Medication List        START taking these medications      metoprolol succinate 50 MG extended release tablet  Commonly known as: TOPROL XL  Take 1 tablet by mouth daily            CONTINUE taking these medications      acetaminophen 500 MG tablet  Commonly known as: TYLENOL     aspirin 81 MG tablet     atorvastatin 80 MG tablet  Commonly known as: LIPITOR     Auryxia 1  MG(Fe) Tabs tablet  Generic drug: ferric citrate     b complex vitamins capsule     diphenhydrAMINE 50 MG capsule  Commonly known as: BENADRYL     insulin aspart protamine-insulin aspart (70-30) 100 UNIT/ML injection  Commonly known as: NovoLOG 70/30     melatonin 3 MG Tabs tablet     metoclopramide 5 MG tablet  Commonly known as: REGLAN     midodrine 5 MG tablet  Commonly known as: PROAMATINE     sevelamer 800 MG tablet  Commonly known as: RENVELA     VITAMIN D2 PO            STOP taking these medications      fenofibrate 160 MG tablet  Commonly known as: TRIGLIDE     vitamin C 500 MG tablet  Commonly known as: ASCORBIC ACID               Where to Get Your Medications        These medications were sent to [address] [place] [address], [address]      Phone: [phone]   metoprolol succinate 50 MG extended release tablet           ISSUES TO BE ADDRESSED AT HOSPITAL FOLLOW-UP VISIT:    Tachycardia/Arrhythmia: Unclear etiology. Patient started on Metoprolol succinate 50 mg daily for prevention. Follow up Ziopatch results, I would not be surprised to find Atrial fibrillation. If so, would need to discuss anticoagulation. Advised patient to follow-up closely with PCP upon discharge for management of chronic medical issues  Please see the detailed discharge directions delivered from earlier today! Condition on Discharge: stable  Discharge Disposition: Home    Recommended Follow Up:  Manish Main MD  [address] [facility]  [phone]    Schedule an appointment as soon as possible for a visit in 1 week(s)  hospital follow-up    Followup Appointments Scheduled at Time of Discharge:  No future appointments. Discharge Instructions:   Please see the discharge paperwork. Patient was seen at bedside today, and the examination shows improvement since yesterday. Detailed discharge directions delivered to the patient by myself and our nursing staff, who verbalizes understanding and is very happy and satisfied with the plan. Patient has been advised to continue all medications as prescribed and advised, and f/u with PCP within 1 week.  Patient is stable from medical standpoint to be discharged. Total time spent during patient evaluation and assessment, discussion with the nurse/family, addressing discharge medications/scripts and coordination of care for safe discharge was 38 minutes.       Signed Electronically:    Chuck Molina MD  11:13 AM 10/1/2022

## 2022-10-03 LAB
EKG P AXIS: 33 DEGREES
EKG P-R INTERVAL: 212 MS
EKG Q-T INTERVAL: 408 MS
EKG QRS DURATION: 150 MS
EKG QTC CALCULATION (BAZETT): 513 MS
EKG T AXIS: 30 DEGREES

## 2022-10-03 PROCEDURE — 93010 ELECTROCARDIOGRAM REPORT: CPT | Performed by: INTERNAL MEDICINE

## 2022-10-10 PROCEDURE — 93248 EXT ECG>7D<15D REV&INTERPJ: CPT | Performed by: INTERNAL MEDICINE

## 2022-10-14 NOTE — PROCEDURES
Cardiac event monitor report    Dates of recording 9/20/2022 through 10/4/2022    Summary impressions:    Sinus rhythm minimal heart rate 58 average 90 maximal 141    2. 1 episode supraventricular tachycardia 6 beats maximum rate 150    3. No episodes of ventricular tachycardia were recorded    4. No pauses 3.0 seconds or longer were recorded    5. No episodes of complete or Mobitz 2 atrioventricular block were recorded    6. No episodes of atrial fibrillation were recorded    7. No triggered or diary events were recorded    8.  Frequent PVCs 10.2% longest ventricular bigeminal episode 28.2 seconds longest ventricular trigeminal episode 3-minute 28 seconds

## 2022-11-18 ENCOUNTER — HOSPITAL ENCOUNTER (OUTPATIENT)
Age: 67
Setting detail: OBSERVATION
Discharge: HOME | End: 2022-11-19
Attending: EMERGENCY MEDICINE | Admitting: INTERNAL MEDICINE
Payer: MEDICARE

## 2022-11-18 ENCOUNTER — APPOINTMENT (OUTPATIENT)
Dept: INTERVENTIONAL RADIOLOGY/VASCULAR | Age: 67
End: 2022-11-18
Payer: MEDICARE

## 2022-11-18 ENCOUNTER — ANESTHESIA (OUTPATIENT)
Dept: OPERATING ROOM | Age: 67
End: 2022-11-18
Payer: MEDICARE

## 2022-11-18 ENCOUNTER — ANESTHESIA EVENT (OUTPATIENT)
Dept: OPERATING ROOM | Age: 67
End: 2022-11-18
Payer: MEDICARE

## 2022-11-18 DIAGNOSIS — T82.590A DIALYSIS AV FISTULA MALFUNCTION, INITIAL ENCOUNTER (HCC): ICD-10-CM

## 2022-11-18 DIAGNOSIS — T82.590A MALFUNCTION OF ARTERIOVENOUS DIALYSIS FISTULA, INITIAL ENCOUNTER (HCC): Primary | ICD-10-CM

## 2022-11-18 DIAGNOSIS — N18.6 ESRD ON HEMODIALYSIS (HCC): ICD-10-CM

## 2022-11-18 DIAGNOSIS — G89.18 POSTOPERATIVE PAIN: ICD-10-CM

## 2022-11-18 DIAGNOSIS — Z99.2 ESRD ON HEMODIALYSIS (HCC): ICD-10-CM

## 2022-11-18 LAB
ANION GAP SERPL CALCULATED.3IONS-SCNC: 16 MMOL/L (ref 7–19)
BUN BLDV-MCNC: 82 MG/DL (ref 8–23)
CALCIUM SERPL-MCNC: 9.1 MG/DL (ref 8.8–10.2)
CHLORIDE BLD-SCNC: 99 MMOL/L (ref 98–111)
CO2: 28 MMOL/L (ref 22–29)
CREAT SERPL-MCNC: 8.2 MG/DL (ref 0.5–1.2)
GFR SERPL CREATININE-BSD FRML MDRD: 7 ML/MIN/{1.73_M2}
GLUCOSE BLD-MCNC: 105 MG/DL (ref 70–99)
GLUCOSE BLD-MCNC: 140 MG/DL (ref 74–109)
GLUCOSE BLD-MCNC: 327 MG/DL (ref 70–99)
HBA1C MFR BLD: 6.5 % (ref 4–6)
HCT VFR BLD CALC: 44.8 % (ref 42–52)
HEMOGLOBIN: 13.4 G/DL (ref 14–18)
INR BLD: 1.02 (ref 0.88–1.18)
MCH RBC QN AUTO: 28.1 PG (ref 27–31)
MCHC RBC AUTO-ENTMCNC: 29.9 G/DL (ref 33–37)
MCV RBC AUTO: 93.9 FL (ref 80–94)
PDW BLD-RTO: 16.9 % (ref 11.5–14.5)
PERFORMED ON: ABNORMAL
PERFORMED ON: ABNORMAL
PLATELET # BLD: 133 K/UL (ref 130–400)
PMV BLD AUTO: 9.6 FL (ref 9.4–12.4)
POTASSIUM REFLEX MAGNESIUM: 4.9 MMOL/L (ref 3.5–5)
PROTHROMBIN TIME: 13.3 SEC (ref 12–14.6)
RBC # BLD: 4.77 M/UL (ref 4.7–6.1)
SARS-COV-2, NAAT: NOT DETECTED
SODIUM BLD-SCNC: 143 MMOL/L (ref 136–145)
WBC # BLD: 11 K/UL (ref 4.8–10.8)

## 2022-11-18 PROCEDURE — 83036 HEMOGLOBIN GLYCOSYLATED A1C: CPT

## 2022-11-18 PROCEDURE — 85027 COMPLETE CBC AUTOMATED: CPT

## 2022-11-18 PROCEDURE — 6370000000 HC RX 637 (ALT 250 FOR IP): Performed by: SURGERY

## 2022-11-18 PROCEDURE — G0378 HOSPITAL OBSERVATION PER HR: HCPCS

## 2022-11-18 PROCEDURE — 6360000002 HC RX W HCPCS: Performed by: SURGERY

## 2022-11-18 PROCEDURE — 2709999900 HC NON-CHARGEABLE SUPPLY: Performed by: SURGERY

## 2022-11-18 PROCEDURE — 3700000001 HC ADD 15 MINUTES (ANESTHESIA): Performed by: SURGERY

## 2022-11-18 PROCEDURE — 6360000002 HC RX W HCPCS: Performed by: NURSE ANESTHETIST, CERTIFIED REGISTERED

## 2022-11-18 PROCEDURE — 8010000000 HC HEMODIALYSIS ACUTE INPT

## 2022-11-18 PROCEDURE — 6370000000 HC RX 637 (ALT 250 FOR IP): Performed by: NURSE PRACTITIONER

## 2022-11-18 PROCEDURE — 77001 FLUOROGUIDE FOR VEIN DEVICE: CPT

## 2022-11-18 PROCEDURE — 80048 BASIC METABOLIC PNL TOTAL CA: CPT

## 2022-11-18 PROCEDURE — C1769 GUIDE WIRE: HCPCS | Performed by: SURGERY

## 2022-11-18 PROCEDURE — 85610 PROTHROMBIN TIME: CPT

## 2022-11-18 PROCEDURE — 36415 COLL VENOUS BLD VENIPUNCTURE: CPT

## 2022-11-18 PROCEDURE — 3600000003 HC SURGERY LEVEL 3 BASE: Performed by: SURGERY

## 2022-11-18 PROCEDURE — 7100000000 HC PACU RECOVERY - FIRST 15 MIN: Performed by: SURGERY

## 2022-11-18 PROCEDURE — 3600000013 HC SURGERY LEVEL 3 ADDTL 15MIN: Performed by: SURGERY

## 2022-11-18 PROCEDURE — A4217 STERILE WATER/SALINE, 500 ML: HCPCS | Performed by: SURGERY

## 2022-11-18 PROCEDURE — 7100000001 HC PACU RECOVERY - ADDTL 15 MIN: Performed by: SURGERY

## 2022-11-18 PROCEDURE — 2500000003 HC RX 250 WO HCPCS: Performed by: NURSE ANESTHETIST, CERTIFIED REGISTERED

## 2022-11-18 PROCEDURE — 2580000003 HC RX 258: Performed by: SURGERY

## 2022-11-18 PROCEDURE — 3700000000 HC ANESTHESIA ATTENDED CARE: Performed by: SURGERY

## 2022-11-18 PROCEDURE — 2580000003 HC RX 258: Performed by: ANESTHESIOLOGY

## 2022-11-18 PROCEDURE — 87635 SARS-COV-2 COVID-19 AMP PRB: CPT

## 2022-11-18 PROCEDURE — 82947 ASSAY GLUCOSE BLOOD QUANT: CPT

## 2022-11-18 PROCEDURE — C1768 GRAFT, VASCULAR: HCPCS | Performed by: SURGERY

## 2022-11-18 PROCEDURE — C1750 CATH, HEMODIALYSIS,LONG-TERM: HCPCS | Performed by: SURGERY

## 2022-11-18 PROCEDURE — 76937 US GUIDE VASCULAR ACCESS: CPT

## 2022-11-18 PROCEDURE — 99285 EMERGENCY DEPT VISIT HI MDM: CPT

## 2022-11-18 DEVICE — GRAFT VASC L40CM ID7MM BOV CAR ART CLLGN FOR FUNC HEMO DYLS: Type: IMPLANTABLE DEVICE | Site: ARM | Status: FUNCTIONAL

## 2022-11-18 RX ORDER — PROPOFOL 10 MG/ML
INJECTION, EMULSION INTRAVENOUS PRN
Status: DISCONTINUED | OUTPATIENT
Start: 2022-11-18 | End: 2022-11-18 | Stop reason: SDUPTHER

## 2022-11-18 RX ORDER — METOCLOPRAMIDE HYDROCHLORIDE 5 MG/ML
10 INJECTION INTRAMUSCULAR; INTRAVENOUS
Status: DISCONTINUED | OUTPATIENT
Start: 2022-11-18 | End: 2022-11-18 | Stop reason: HOSPADM

## 2022-11-18 RX ORDER — SODIUM CHLORIDE 450 MG/100ML
INJECTION, SOLUTION INTRAVENOUS CONTINUOUS
Status: DISCONTINUED | OUTPATIENT
Start: 2022-11-18 | End: 2022-11-18 | Stop reason: HOSPADM

## 2022-11-18 RX ORDER — SODIUM CHLORIDE 9 MG/ML
INJECTION, SOLUTION INTRAVENOUS PRN
Status: DISCONTINUED | OUTPATIENT
Start: 2022-11-18 | End: 2022-11-19 | Stop reason: HOSPADM

## 2022-11-18 RX ORDER — VITAMIN C
1 TAB ORAL DAILY
Status: DISCONTINUED | OUTPATIENT
Start: 2022-11-18 | End: 2022-11-19 | Stop reason: HOSPADM

## 2022-11-18 RX ORDER — HYDROMORPHONE HYDROCHLORIDE 1 MG/ML
0.25 INJECTION, SOLUTION INTRAMUSCULAR; INTRAVENOUS; SUBCUTANEOUS
Status: DISCONTINUED | OUTPATIENT
Start: 2022-11-18 | End: 2022-11-19 | Stop reason: HOSPADM

## 2022-11-18 RX ORDER — HYDROMORPHONE HYDROCHLORIDE 1 MG/ML
0.5 INJECTION, SOLUTION INTRAMUSCULAR; INTRAVENOUS; SUBCUTANEOUS
Status: DISCONTINUED | OUTPATIENT
Start: 2022-11-18 | End: 2022-11-19 | Stop reason: HOSPADM

## 2022-11-18 RX ORDER — ONDANSETRON 4 MG/1
4 TABLET, ORALLY DISINTEGRATING ORAL EVERY 8 HOURS PRN
Status: DISCONTINUED | OUTPATIENT
Start: 2022-11-18 | End: 2022-11-19 | Stop reason: HOSPADM

## 2022-11-18 RX ORDER — ONDANSETRON 2 MG/ML
4 INJECTION INTRAMUSCULAR; INTRAVENOUS EVERY 6 HOURS PRN
Status: DISCONTINUED | OUTPATIENT
Start: 2022-11-18 | End: 2022-11-18 | Stop reason: SDUPTHER

## 2022-11-18 RX ORDER — ONDANSETRON 2 MG/ML
4 INJECTION INTRAMUSCULAR; INTRAVENOUS EVERY 6 HOURS PRN
Status: DISCONTINUED | OUTPATIENT
Start: 2022-11-18 | End: 2022-11-19 | Stop reason: HOSPADM

## 2022-11-18 RX ORDER — ROCURONIUM BROMIDE 10 MG/ML
INJECTION, SOLUTION INTRAVENOUS PRN
Status: DISCONTINUED | OUTPATIENT
Start: 2022-11-18 | End: 2022-11-18 | Stop reason: SDUPTHER

## 2022-11-18 RX ORDER — INSULIN LISPRO 100 [IU]/ML
0-4 INJECTION, SOLUTION INTRAVENOUS; SUBCUTANEOUS
Status: DISCONTINUED | OUTPATIENT
Start: 2022-11-18 | End: 2022-11-19 | Stop reason: HOSPADM

## 2022-11-18 RX ORDER — CLONIDINE HYDROCHLORIDE 0.1 MG/1
0.1 TABLET ORAL
Status: DISCONTINUED | OUTPATIENT
Start: 2022-11-18 | End: 2022-11-19 | Stop reason: HOSPADM

## 2022-11-18 RX ORDER — HEPARIN SODIUM 5000 [USP'U]/ML
5000 INJECTION, SOLUTION INTRAVENOUS; SUBCUTANEOUS EVERY 8 HOURS SCHEDULED
Status: DISCONTINUED | OUTPATIENT
Start: 2022-11-18 | End: 2022-11-19 | Stop reason: HOSPADM

## 2022-11-18 RX ORDER — RIFAMPIN 600 MG/10ML
INJECTION, POWDER, LYOPHILIZED, FOR SOLUTION INTRAVENOUS PRN
Status: DISCONTINUED | OUTPATIENT
Start: 2022-11-18 | End: 2022-11-18 | Stop reason: HOSPADM

## 2022-11-18 RX ORDER — METOPROLOL SUCCINATE 50 MG/1
50 TABLET, EXTENDED RELEASE ORAL DAILY
Status: DISCONTINUED | OUTPATIENT
Start: 2022-11-18 | End: 2022-11-19 | Stop reason: HOSPADM

## 2022-11-18 RX ORDER — OXYCODONE HYDROCHLORIDE 5 MG/1
5 TABLET ORAL EVERY 6 HOURS PRN
Status: DISCONTINUED | OUTPATIENT
Start: 2022-11-18 | End: 2022-11-19 | Stop reason: HOSPADM

## 2022-11-18 RX ORDER — LIDOCAINE HYDROCHLORIDE 10 MG/ML
INJECTION, SOLUTION EPIDURAL; INFILTRATION; INTRACAUDAL; PERINEURAL PRN
Status: DISCONTINUED | OUTPATIENT
Start: 2022-11-18 | End: 2022-11-18 | Stop reason: SDUPTHER

## 2022-11-18 RX ORDER — DIPHENHYDRAMINE HCL 25 MG
50 TABLET ORAL EVERY 6 HOURS PRN
Status: DISCONTINUED | OUTPATIENT
Start: 2022-11-18 | End: 2022-11-19 | Stop reason: HOSPADM

## 2022-11-18 RX ORDER — DIPHENHYDRAMINE HYDROCHLORIDE 50 MG/ML
12.5 INJECTION INTRAMUSCULAR; INTRAVENOUS
Status: DISCONTINUED | OUTPATIENT
Start: 2022-11-18 | End: 2022-11-18 | Stop reason: HOSPADM

## 2022-11-18 RX ORDER — HYDROMORPHONE HYDROCHLORIDE 1 MG/ML
0.5 INJECTION, SOLUTION INTRAMUSCULAR; INTRAVENOUS; SUBCUTANEOUS EVERY 5 MIN PRN
Status: DISCONTINUED | OUTPATIENT
Start: 2022-11-18 | End: 2022-11-18 | Stop reason: HOSPADM

## 2022-11-18 RX ORDER — HYDROMORPHONE HYDROCHLORIDE 1 MG/ML
0.25 INJECTION, SOLUTION INTRAMUSCULAR; INTRAVENOUS; SUBCUTANEOUS EVERY 5 MIN PRN
Status: DISCONTINUED | OUTPATIENT
Start: 2022-11-18 | End: 2022-11-18 | Stop reason: HOSPADM

## 2022-11-18 RX ORDER — METOCLOPRAMIDE 5 MG/1
5 TABLET ORAL 4 TIMES DAILY
Status: DISCONTINUED | OUTPATIENT
Start: 2022-11-18 | End: 2022-11-19 | Stop reason: HOSPADM

## 2022-11-18 RX ORDER — DEXTROSE MONOHYDRATE 100 MG/ML
INJECTION, SOLUTION INTRAVENOUS CONTINUOUS PRN
Status: DISCONTINUED | OUTPATIENT
Start: 2022-11-18 | End: 2022-11-19 | Stop reason: HOSPADM

## 2022-11-18 RX ORDER — HEPARIN SODIUM 1000 [USP'U]/ML
INJECTION, SOLUTION INTRAVENOUS; SUBCUTANEOUS PRN
Status: DISCONTINUED | OUTPATIENT
Start: 2022-11-18 | End: 2022-11-18 | Stop reason: SDUPTHER

## 2022-11-18 RX ORDER — FENTANYL CITRATE 50 UG/ML
INJECTION, SOLUTION INTRAMUSCULAR; INTRAVENOUS PRN
Status: DISCONTINUED | OUTPATIENT
Start: 2022-11-18 | End: 2022-11-18 | Stop reason: SDUPTHER

## 2022-11-18 RX ORDER — SODIUM CHLORIDE 0.9 % (FLUSH) 0.9 %
5-40 SYRINGE (ML) INJECTION EVERY 12 HOURS SCHEDULED
Status: DISCONTINUED | OUTPATIENT
Start: 2022-11-18 | End: 2022-11-18 | Stop reason: HOSPADM

## 2022-11-18 RX ORDER — ONDANSETRON 2 MG/ML
INJECTION INTRAMUSCULAR; INTRAVENOUS PRN
Status: DISCONTINUED | OUTPATIENT
Start: 2022-11-18 | End: 2022-11-18 | Stop reason: SDUPTHER

## 2022-11-18 RX ORDER — EPHEDRINE SULFATE 50 MG/ML
INJECTION, SOLUTION INTRAVENOUS PRN
Status: DISCONTINUED | OUTPATIENT
Start: 2022-11-18 | End: 2022-11-18 | Stop reason: SDUPTHER

## 2022-11-18 RX ORDER — SODIUM CHLORIDE 9 MG/ML
INJECTION, SOLUTION INTRAVENOUS PRN
Status: DISCONTINUED | OUTPATIENT
Start: 2022-11-18 | End: 2022-11-18 | Stop reason: HOSPADM

## 2022-11-18 RX ORDER — POLYETHYLENE GLYCOL 3350 17 G/17G
17 POWDER, FOR SOLUTION ORAL DAILY PRN
Status: DISCONTINUED | OUTPATIENT
Start: 2022-11-18 | End: 2022-11-19 | Stop reason: HOSPADM

## 2022-11-18 RX ORDER — SODIUM CHLORIDE 0.9 % (FLUSH) 0.9 %
5-40 SYRINGE (ML) INJECTION EVERY 12 HOURS SCHEDULED
Status: DISCONTINUED | OUTPATIENT
Start: 2022-11-18 | End: 2022-11-19 | Stop reason: HOSPADM

## 2022-11-18 RX ORDER — INSULIN LISPRO 100 [IU]/ML
0-4 INJECTION, SOLUTION INTRAVENOUS; SUBCUTANEOUS NIGHTLY
Status: DISCONTINUED | OUTPATIENT
Start: 2022-11-18 | End: 2022-11-19 | Stop reason: HOSPADM

## 2022-11-18 RX ORDER — HYDRALAZINE HYDROCHLORIDE 20 MG/ML
10 INJECTION INTRAMUSCULAR; INTRAVENOUS
Status: DISCONTINUED | OUTPATIENT
Start: 2022-11-18 | End: 2022-11-19 | Stop reason: HOSPADM

## 2022-11-18 RX ORDER — SODIUM CHLORIDE 0.9 % (FLUSH) 0.9 %
5-40 SYRINGE (ML) INJECTION PRN
Status: DISCONTINUED | OUTPATIENT
Start: 2022-11-18 | End: 2022-11-19 | Stop reason: HOSPADM

## 2022-11-18 RX ORDER — ASPIRIN 81 MG/1
81 TABLET, CHEWABLE ORAL DAILY
Status: DISCONTINUED | OUTPATIENT
Start: 2022-11-18 | End: 2022-11-19 | Stop reason: HOSPADM

## 2022-11-18 RX ORDER — ATORVASTATIN CALCIUM 40 MG/1
80 TABLET, FILM COATED ORAL NIGHTLY
Status: DISCONTINUED | OUTPATIENT
Start: 2022-11-18 | End: 2022-11-19 | Stop reason: HOSPADM

## 2022-11-18 RX ORDER — SODIUM CHLORIDE 9 MG/ML
INJECTION, SOLUTION INTRAVENOUS PRN
Status: DISCONTINUED | OUTPATIENT
Start: 2022-11-18 | End: 2022-11-18 | Stop reason: SDUPTHER

## 2022-11-18 RX ORDER — ACETAMINOPHEN 650 MG/1
650 SUPPOSITORY RECTAL EVERY 6 HOURS PRN
Status: DISCONTINUED | OUTPATIENT
Start: 2022-11-18 | End: 2022-11-19 | Stop reason: HOSPADM

## 2022-11-18 RX ORDER — PROTAMINE SULFATE 10 MG/ML
INJECTION, SOLUTION INTRAVENOUS PRN
Status: DISCONTINUED | OUTPATIENT
Start: 2022-11-18 | End: 2022-11-18 | Stop reason: SDUPTHER

## 2022-11-18 RX ORDER — SEVELAMER CARBONATE 800 MG/1
800 TABLET, FILM COATED ORAL
Status: DISCONTINUED | OUTPATIENT
Start: 2022-11-18 | End: 2022-11-19 | Stop reason: HOSPADM

## 2022-11-18 RX ORDER — ENOXAPARIN SODIUM 100 MG/ML
40 INJECTION SUBCUTANEOUS DAILY
Status: DISCONTINUED | OUTPATIENT
Start: 2022-11-18 | End: 2022-11-18 | Stop reason: DRUGHIGH

## 2022-11-18 RX ORDER — ACETAMINOPHEN 325 MG/1
650 TABLET ORAL EVERY 6 HOURS PRN
Status: DISCONTINUED | OUTPATIENT
Start: 2022-11-18 | End: 2022-11-19 | Stop reason: HOSPADM

## 2022-11-18 RX ORDER — SODIUM CHLORIDE 0.9 % (FLUSH) 0.9 %
5-40 SYRINGE (ML) INJECTION PRN
Status: DISCONTINUED | OUTPATIENT
Start: 2022-11-18 | End: 2022-11-18 | Stop reason: HOSPADM

## 2022-11-18 RX ORDER — ONDANSETRON 4 MG/1
4 TABLET, ORALLY DISINTEGRATING ORAL EVERY 8 HOURS PRN
Status: DISCONTINUED | OUTPATIENT
Start: 2022-11-18 | End: 2022-11-18 | Stop reason: SDUPTHER

## 2022-11-18 RX ORDER — MECOBALAMIN 5000 MCG
10 TABLET,DISINTEGRATING ORAL NIGHTLY PRN
Status: DISCONTINUED | OUTPATIENT
Start: 2022-11-18 | End: 2022-11-19 | Stop reason: HOSPADM

## 2022-11-18 RX ADMIN — ONDANSETRON 4 MG: 2 INJECTION INTRAMUSCULAR; INTRAVENOUS at 12:52

## 2022-11-18 RX ADMIN — HEPARIN SODIUM 5000 UNITS: 1000 INJECTION, SOLUTION INTRAVENOUS; SUBCUTANEOUS at 11:53

## 2022-11-18 RX ADMIN — SODIUM CHLORIDE: 4.5 INJECTION, SOLUTION INTRAVENOUS at 10:31

## 2022-11-18 RX ADMIN — HEPARIN SODIUM 5000 UNITS: 5000 INJECTION INTRAVENOUS; SUBCUTANEOUS at 22:03

## 2022-11-18 RX ADMIN — ATORVASTATIN CALCIUM 80 MG: 40 TABLET, FILM COATED ORAL at 21:54

## 2022-11-18 RX ADMIN — PROPOFOL 120 MG: 10 INJECTION, EMULSION INTRAVENOUS at 11:09

## 2022-11-18 RX ADMIN — SEVELAMER CARBONATE 800 MG: 800 TABLET, FILM COATED ORAL at 20:05

## 2022-11-18 RX ADMIN — SUGAMMADEX 200 MG: 100 INJECTION, SOLUTION INTRAVENOUS at 12:52

## 2022-11-18 RX ADMIN — PROPOFOL 50 MG: 10 INJECTION, EMULSION INTRAVENOUS at 11:45

## 2022-11-18 RX ADMIN — EPHEDRINE SULFATE 10 MG: 50 INJECTION INTRAMUSCULAR; INTRAVENOUS; SUBCUTANEOUS at 12:35

## 2022-11-18 RX ADMIN — PROTAMINE SULFATE 30 MG: 10 INJECTION, SOLUTION INTRAVENOUS at 12:42

## 2022-11-18 RX ADMIN — FENTANYL CITRATE 25 MCG: 50 INJECTION, SOLUTION INTRAMUSCULAR; INTRAVENOUS at 11:27

## 2022-11-18 RX ADMIN — INSULIN LISPRO 4 UNITS: 100 INJECTION, SOLUTION INTRAVENOUS; SUBCUTANEOUS at 22:02

## 2022-11-18 RX ADMIN — FENTANYL CITRATE 25 MCG: 50 INJECTION, SOLUTION INTRAMUSCULAR; INTRAVENOUS at 11:40

## 2022-11-18 RX ADMIN — LIDOCAINE HYDROCHLORIDE 50 ML: 10 INJECTION, SOLUTION EPIDURAL; INFILTRATION; INTRACAUDAL; PERINEURAL at 11:09

## 2022-11-18 RX ADMIN — FENTANYL CITRATE 25 MCG: 50 INJECTION, SOLUTION INTRAMUSCULAR; INTRAVENOUS at 11:09

## 2022-11-18 RX ADMIN — FENTANYL CITRATE 25 MCG: 50 INJECTION, SOLUTION INTRAMUSCULAR; INTRAVENOUS at 11:53

## 2022-11-18 RX ADMIN — Medication 1000 MG: at 11:01

## 2022-11-18 RX ADMIN — ROCURONIUM BROMIDE 50 MG: 10 INJECTION, SOLUTION INTRAVENOUS at 11:09

## 2022-11-18 RX ADMIN — METOCLOPRAMIDE 5 MG: 5 TABLET ORAL at 21:54

## 2022-11-18 RX ADMIN — SODIUM CHLORIDE, PRESERVATIVE FREE 10 ML: 5 INJECTION INTRAVENOUS at 21:55

## 2022-11-18 RX ADMIN — HYDROMORPHONE HYDROCHLORIDE 0.5 MG: 1 INJECTION, SOLUTION INTRAMUSCULAR; INTRAVENOUS; SUBCUTANEOUS at 20:15

## 2022-11-18 SDOH — HEALTH STABILITY: PHYSICAL HEALTH: ON AVERAGE, HOW MANY DAYS PER WEEK DO YOU ENGAGE IN MODERATE TO STRENUOUS EXERCISE (LIKE A BRISK WALK)?: 5 DAYS

## 2022-11-18 SDOH — HEALTH STABILITY: PHYSICAL HEALTH: ON AVERAGE, HOW MANY MINUTES DO YOU ENGAGE IN EXERCISE AT THIS LEVEL?: 10 MIN

## 2022-11-18 SDOH — ECONOMIC STABILITY: HOUSING INSECURITY
IN THE LAST 12 MONTHS, WAS THERE A TIME WHEN YOU DID NOT HAVE A STEADY PLACE TO SLEEP OR SLEPT IN A SHELTER (INCLUDING NOW)?: NO

## 2022-11-18 SDOH — ECONOMIC STABILITY: TRANSPORTATION INSECURITY
IN THE PAST 12 MONTHS, HAS LACK OF TRANSPORTATION KEPT YOU FROM MEETINGS, WORK, OR FROM GETTING THINGS NEEDED FOR DAILY LIVING?: NO

## 2022-11-18 SDOH — ECONOMIC STABILITY: INCOME INSECURITY: IN THE LAST 12 MONTHS, WAS THERE A TIME WHEN YOU WERE NOT ABLE TO PAY THE MORTGAGE OR RENT ON TIME?: NO

## 2022-11-18 SDOH — ECONOMIC STABILITY: HOUSING INSECURITY: IN THE LAST 12 MONTHS, HOW MANY PLACES HAVE YOU LIVED?: 1

## 2022-11-18 SDOH — ECONOMIC STABILITY: TRANSPORTATION INSECURITY
IN THE PAST 12 MONTHS, HAS THE LACK OF TRANSPORTATION KEPT YOU FROM MEDICAL APPOINTMENTS OR FROM GETTING MEDICATIONS?: YES

## 2022-11-18 ASSESSMENT — ENCOUNTER SYMPTOMS
CONSTIPATION: 0
BLOOD IN STOOL: 0
SHORTNESS OF BREATH: 0
COLOR CHANGE: 0
ABDOMINAL DISTENTION: 0
VOMITING: 0
WHEEZING: 0
COUGH: 0
NAUSEA: 0
DIARRHEA: 0
ABDOMINAL PAIN: 0
SHORTNESS OF BREATH: 0

## 2022-11-18 ASSESSMENT — PAIN DESCRIPTION - ORIENTATION
ORIENTATION: RIGHT
ORIENTATION: MID

## 2022-11-18 ASSESSMENT — PAIN SCALES - GENERAL
PAINLEVEL_OUTOF10: 7
PAINLEVEL_OUTOF10: 0
PAINLEVEL_OUTOF10: 8

## 2022-11-18 ASSESSMENT — LIFESTYLE VARIABLES
HOW OFTEN DO YOU HAVE A DRINK CONTAINING ALCOHOL: NEVER
HOW MANY STANDARD DRINKS CONTAINING ALCOHOL DO YOU HAVE ON A TYPICAL DAY: PATIENT DOES NOT DRINK
SMOKING_STATUS: 0
HOW OFTEN DO YOU HAVE A DRINK CONTAINING ALCOHOL: NEVER

## 2022-11-18 ASSESSMENT — PAIN DESCRIPTION - LOCATION
LOCATION: NECK
LOCATION: BACK;NECK

## 2022-11-18 ASSESSMENT — PAIN SCALES - WONG BAKER: WONGBAKER_NUMERICALRESPONSE: 0

## 2022-11-18 ASSESSMENT — SOCIAL DETERMINANTS OF HEALTH (SDOH)
WITHIN THE LAST YEAR, HAVE TO BEEN RAPED OR FORCED TO HAVE ANY KIND OF SEXUAL ACTIVITY BY YOUR PARTNER OR EX-PARTNER?: NO
HOW HARD IS IT FOR YOU TO PAY FOR THE VERY BASICS LIKE FOOD, HOUSING, MEDICAL CARE, AND HEATING?: NOT HARD AT ALL
WITHIN THE LAST YEAR, HAVE YOU BEEN AFRAID OF YOUR PARTNER OR EX-PARTNER?: NO
WITHIN THE LAST YEAR, HAVE YOU BEEN HUMILIATED OR EMOTIONALLY ABUSED IN OTHER WAYS BY YOUR PARTNER OR EX-PARTNER?: NO
WITHIN THE LAST YEAR, HAVE YOU BEEN KICKED, HIT, SLAPPED, OR OTHERWISE PHYSICALLY HURT BY YOUR PARTNER OR EX-PARTNER?: NO

## 2022-11-18 ASSESSMENT — PAIN - FUNCTIONAL ASSESSMENT
PAIN_FUNCTIONAL_ASSESSMENT: PREVENTS OR INTERFERES SOME ACTIVE ACTIVITIES AND ADLS
PAIN_FUNCTIONAL_ASSESSMENT: NONE - DENIES PAIN

## 2022-11-18 ASSESSMENT — PAIN DESCRIPTION - DESCRIPTORS: DESCRIPTORS: DULL;DISCOMFORT

## 2022-11-18 NOTE — ANESTHESIA PRE PROCEDURE
Department of Anesthesiology  Preprocedure Note       Name:  Aria Godinez   Age:  79 y.o.  :  1955                                          MRN:  [id number]         Date:  2022      Surgeon: Yo Carty):  Sylvie Myers MD    Procedure: LEFT ARM FISTULA DECLOT POSSIBLE REVISION (Left)  POSSIBLE PERMA-CATH    Medications prior to admission:   Prior to Admission medications    Medication Sig Start Date End Date Taking?  Authorizing Provider   metoprolol succinate (TOPROL XL) 50 MG extended release tablet Take 1 tablet by mouth daily 10/1/22   Mann Montaño MD   b complex vitamins capsule Take 1 capsule by mouth daily    Historical Provider, MD   ferric citrate (AURYXIA) 1  MG(Fe) TABS tablet Take by mouth 3 times daily (with meals)    Historical Provider, MD   vitamin C (ASCORBIC ACID) 500 MG tablet Take 1,000 mg by mouth daily  22  Historical Provider, MD   atorvastatin (LIPITOR) 80 MG tablet Take 80 mg by mouth nightly    Historical Provider, MD   diphenhydrAMINE (BENADRYL) 50 MG capsule Take 100 mg by mouth every 6 hours as needed for Itching    Historical Provider, MD   midodrine (PROAMATINE) 5 MG tablet Take 10 mg by mouth as needed DURING DIALYSIS IF NEEDED    Historical Provider, MD   sevelamer (RENVELA) 800 MG tablet Take 1 tablet by mouth Daily with supper    Historical Provider, MD   Ergocalciferol (VITAMIN D2 PO) Take 1,250 mcg by mouth once a week Takes on Wednesday    Historical Provider, MD   insulin aspart protamine-insulin aspart (NOVOLOG 70/30) (70-30) 100 UNIT/ML injection Inject 10 Units into the skin 2 times daily (with meals) 10 - 25 UNITS ONCE OR TWICE A DAY    Historical Provider, MD   melatonin 3 MG TABS tablet Take 10 mg by mouth nightly as needed     Historical Provider, MD   acetaminophen (TYLENOL) 500 MG tablet Take 500 mg by mouth every 6 hours as needed for Pain    Historical Provider, MD   metoclopramide (REGLAN) 5 MG tablet Take 5 mg by mouth 4 times daily 5/29/18   Historical Provider, MD   aspirin 81 MG tablet Take 81 mg by mouth daily    Historical Provider, MD   fenofibrate 160 MG tablet Take 160 mg by mouth at bedtime 5/6/18 9/20/22  Historical Provider, MD       Current medications:    No current facility-administered medications for this visit. No current outpatient medications on file.      Facility-Administered Medications Ordered in Other Visits   Medication Dose Route Frequency Provider Last Rate Last Admin    sodium chloride flush 0.9 % injection 5-40 mL  5-40 mL IntraVENous 2 times per day Margo Thompson APRN - CNP        sodium chloride flush 0.9 % injection 5-40 mL  5-40 mL IntraVENous PRN Margo Buschl, APRN - CNP        0.9 % sodium chloride infusion   IntraVENous PRN Margo Buschl, APRN - CNP        ondansetron (ZOFRAN-ODT) disintegrating tablet 4 mg  4 mg Oral Q8H PRN Margo Santiagowl, APRN - CNP        Or    ondansetron (ZOFRAN) injection 4 mg  4 mg IntraVENous Q6H PRN Margo Buschl, APRN - CNP        polyethylene glycol (GLYCOLAX) packet 17 g  17 g Oral Daily PRN Margo Narayanl, APRN - CNP        acetaminophen (TYLENOL) tablet 650 mg  650 mg Oral Q6H PRN Margo Jackwl, APRN - CNP        Or    acetaminophen (TYLENOL) suppository 650 mg  650 mg Rectal Q6H PRN Margo Jackwl, APRN - CNP        heparin (porcine) injection 5,000 Units  5,000 Units SubCUTAneous 3 times per day Margo Thompson, APRN - CNP           Allergies:  No Known Allergies    Problem List:    Patient Active Problem List   Diagnosis Code    Dialysis AV fistula malfunction, subsequent encounter T82.590D    ESRD on hemodialysis ([facility] N18.6, Z99.2    Diabetes ([facility] E11.9    Benign essential HTN I10    Hyperlipidemia E78.5    Near syncope R55    Arrhythmia I49.9    [facility] AV fistula malfunction, [facility] T82.590A       Past Medical History:        Diagnosis Date    Arthritis     Chronic kidney disease     CKD (chronic kidney disease) stage V requiring chronic dialysis ([facility] 12/19/2019    COVID-19 08/2021    Diabetes mellitus ([facility]     Diabetic retinopathy ([address]     Digestive disorder     \" slow digestion \"    Hemodialysis patient ([facility]     dialysis on mon, wed, and friday at [address] Hypertension     Legally blind     Neuropathy     feet       Past Surgical History:        Procedure Laterality Date    CATARACT REMOVAL      DIALYSIS FISTULA CREATION Left 2/4/2020    LIGATION OF LEFT RADIAL ARTERY TO LEFT CEPHALIC VEIN HEMODIALYSIS FISTULA; CREATION OF LEFT BRACHIAL ARTERY TO LEFT CEPHALIC VEIN PRIMARY ARTERIO-VENOUS FISTULA performed by Liyah Augustin MD at . [address] Right 2/4/2020    INSERTION OF RIGHT INTERNAL JUGULAR HEMODIALYSIS CATHETER performed by Liyah Augustin MD at [address] [place] [address] Dr Apodaca Left 7/2/2018    ARM RADICAL/ CEPHALIC AV FISTULA performed by Dianne Beaver MD at [address] [place] [address]  12/13/2018    S. Left upper fistulograms, BA distal forearm cephalic vein 8R78 cutter,coil branch (5x3(2)    VASCULAR SURGERY  03/28/2019    SJS. Left upper fistulograms/venograms.     VASCULAR SURGERY  02/04/2020    SJS Left brachial artery to Cephalic Vein Arteriovenous Fistula Creation, Ligation of distal forearm cephallic vein near the radial cephallic arteriovenous anastomosis, Ultrasound-guided cannulation of right internal jugular vein a placement of right internal jugular vein tunneled dialysis catheter (glidepath 23 cm tip to cuff)    VASCULAR SURGERY  02/04/2020    SJS  Left brachial artery Cephalic Vein Arteriovenous Fistula Creation, Ligation of distal forearm cephalic vein near the radial cephalic arteriovenous anastomosis, Ultrasound-guided cannulation of right internal juhular vein and placement of right internal jugular vein tunneled dialysis catheter (glidepath 23 cm tip to cuff)     VASCULAR SURGERY  04/02/2020    SJS removal of tunneled dialysis catheter right internal jugular vein       Social History:    Social History     Tobacco Use    Smoking status: Never    Smokeless tobacco: Never   Substance Use Topics    Alcohol use: No                                Counseling given: Not Answered      Vital Signs (Current): There were no vitals filed for this visit. BP Readings from Last 3 Encounters:   11/18/22 (!) 159/84   10/01/22 (!) 141/81   09/20/22 126/74       NPO Status:                                                                                 BMI:   Wt Readings from Last 3 Encounters:   11/18/22 185 lb (83.9 kg)   10/01/22 176 lb 3.2 oz (79.9 kg)   09/20/22 174 lb 12.8 oz (79.3 kg)     There is no height or weight on file to calculate BMI.    CBC:   Lab Results   Component Value Date/Time    WBC 11.0 11/18/2022 08:09 AM    RBC 4.77 11/18/2022 08:09 AM    HGB 13.4 11/18/2022 08:09 AM    HCT 44.8 11/18/2022 08:09 AM    MCV 93.9 11/18/2022 08:09 AM    RDW 16.9 11/18/2022 08:09 AM     11/18/2022 08:09 AM       CMP:   Lab Results   Component Value Date/Time     11/18/2022 08:09 AM    K 4.9 11/18/2022 08:09 AM    CL 99 11/18/2022 08:09 AM    CO2 28 11/18/2022 08:09 AM    BUN 82 11/18/2022 08:09 AM    CREATININE 8.2 11/18/2022 08:09 AM    GFRAA 11 10/01/2022 01:37 AM    LABGLOM 7 11/18/2022 08:09 AM    GLUCOSE 140 11/18/2022 08:09 AM    PROT 8.0 09/30/2022 04:22 PM    CALCIUM 9.1 11/18/2022 08:09 AM    BILITOT 0.3 09/30/2022 04:22 PM    ALKPHOS 126 09/30/2022 04:22 PM    AST 30 09/30/2022 04:22 PM    ALT 34 09/30/2022 04:22 PM       POC Tests: No results for input(s): POCGLU, POCNA, POCK, POCCL, POCBUN, POCHEMO, POCHCT in the last 72 hours.     Coags:   Lab Results   Component Value Date/Time    PROTIME 13.3 11/18/2022 08:06 AM    INR 1.02 11/18/2022 08:06 AM    APTT 34.2 09/19/2022 11:13 AM       HCG (If Applicable): No results found for: PREGTESTUR, PREGSERUM, HCG, HCGQUANT ABGs: No results found for: PHART, PO2ART, ZRW1QRK, LFT7ZQY, BEART, Z3SHDXQC     Type & Screen (If Applicable):  No results found for: LABABO, [address]    Anesthesia Evaluation  Patient summary reviewed and Nursing notes reviewed no history of anesthetic complications:   Airway: Mallampati: II  TM distance: >3 FB   Neck ROM: full  Mouth opening: > = 3 FB   Dental: normal exam   (+) upper dentures      Pulmonary:Negative Pulmonary ROS and normal exam  breath sounds clear to auscultation      (-) shortness of breath and not a current smoker          Patient did not smoke on day of surgery. Cardiovascular:    (+) hypertension: moderate, hyperlipidemia    (-) CAD,  angina and  CHF    NYHA Classification: I  ECG reviewed  Rhythm: regular  Rate: normal           Beta Blocker:  Not on Beta Blocker      ROS comment:  Mitral valve leaflets are mildly thickened with preserved leaflet   mobility. Mild to moderate mitral regurgitation is present. Mildly thickened aortic valve leaflets with preserved leaflet mobility. Tricuspid valve is structurally normal.   trivial tricuspid regurgitation. Mildly dilated left atrium. Normal left ventricular size with preserved LV function and an estimated   ejection fraction of approximately 55-60%. Normal left ventricular wall thickness. No regional wall motion abnormalities. Neuro/Psych:   Negative Neuro/Psych ROS     (-) seizures, CVA and depression/anxiety            GI/Hepatic/Renal: Neg GI/Hepatic/Renal ROS  (+) renal disease: ESRD, CRI and dialysis,      (-) hiatal hernia and GERD       Endo/Other: Negative Endo/Other ROS   (+) DiabetesType II DM, , : arthritis: OA., . Pt had PAT visit. Abdominal:       Abdomen: soft. Vascular:   + PVD, aortic or cerebral, . Other Findings:             Anesthesia Plan      general     ASA 4     (Labs pending )  Induction: intravenous.       Anesthetic plan and risks discussed with patient. Use of blood products discussed with patient whom. Plan discussed with CRNA.     Attending anesthesiologist reviewed and agrees with Pre Eval content                Bernadette Taylor MD   11/18/2022

## 2022-11-18 NOTE — CONSULTS
Vascular Surgery Consultation    I was asked to see the patient for dialysis fistula/graft problems. HPI    This is a 79year old  man whom is having dialysis access problems. The patient has a left  brachial cephalic arteriovenous fistula. The patient has ESRD and is dialyzed every MWF. He went to dialysis today and they pulled clots. He also tells me that he has a sore over the fistula that occurred a couple of weeks ago where two needles were close together. He has a clot over this.       Current Medical History    Amrita Díaz is a 79 y.o. male with the following history reviewed and recorded in [facility]:  Patient Active Problem List    Diagnosis Date Noted    Dialysis AV fistula malfunction, initial encounter ([facility] 11/18/2022     Priority: Medium    Arrhythmia 09/30/2022     Priority: Medium    Near syncope 09/19/2022     Priority: Medium    Diabetes ([facility] 10/28/2021    Benign essential HTN 10/28/2021    Hyperlipidemia 10/28/2021    [facility] 12/19/2019    Dialysis AV fistula malfunction, subsequent encounter 12/13/2018     Current Facility-Administered Medications   Medication Dose Route Frequency Provider Last Rate Last Admin    sodium chloride flush 0.9 % injection 5-40 mL  5-40 mL IntraVENous 2 times per day Rosilyn Helms, APRN - CNP        sodium chloride flush 0.9 % injection 5-40 mL  5-40 mL IntraVENous PRN Rosilyn Helms, APRN - CNP        0.9 % sodium chloride infusion   IntraVENous PRN Rosilyn Helms, APRN - CNP        ondansetron (ZOFRAN-ODT) disintegrating tablet 4 mg  4 mg Oral Q8H PRN Rosilyn Helms, APRN - CNP        Or    ondansetron (ZOFRAN) injection 4 mg  4 mg IntraVENous Q6H PRN Rosilyn Helms, APRN - CNP        polyethylene glycol (GLYCOLAX) packet 17 g  17 g Oral Daily PRN Rosilyn Helms, APRN - CNP        acetaminophen (TYLENOL) tablet 650 mg  650 mg Oral Q6H PRN Rosilyn Helms, APRN - CNP        Or    acetaminophen (TYLENOL) suppository 650 mg  650 mg Rectal Q6H PRN CALIXTO Garrison - CNP        [Held by provider] enoxaparin (LOVENOX) injection 40 mg  40 mg SubCUTAneous Daily CALIXTO Garrison CNP         Current Outpatient Medications   Medication Sig Dispense Refill    metoprolol succinate (TOPROL XL) 50 MG extended release tablet Take 1 tablet by mouth daily 30 tablet 0    b complex vitamins capsule Take 1 capsule by mouth daily      ferric citrate (AURYXIA) 1  MG(Fe) TABS tablet Take by mouth 3 times daily (with meals)      atorvastatin (LIPITOR) 80 MG tablet Take 80 mg by mouth nightly      diphenhydrAMINE (BENADRYL) 50 MG capsule Take 100 mg by mouth every 6 hours as needed for Itching      midodrine (PROAMATINE) 5 MG tablet Take 10 mg by mouth as needed DURING DIALYSIS IF NEEDED      sevelamer (RENVELA) 800 MG tablet Take 1 tablet by mouth Daily with supper      Ergocalciferol (VITAMIN D2 PO) Take 1,250 mcg by mouth once a week Takes on Wednesday      insulin aspart protamine-insulin aspart (NOVOLOG 70/30) (70-30) 100 UNIT/ML injection Inject 10 Units into the skin 2 times daily (with meals) 10 - 25 UNITS ONCE OR TWICE A DAY      melatonin 3 MG TABS tablet Take 10 mg by mouth nightly as needed       acetaminophen (TYLENOL) 500 MG tablet Take 500 mg by mouth every 6 hours as needed for Pain      metoclopramide (REGLAN) 5 MG tablet Take 5 mg by mouth 4 times daily      aspirin 81 MG tablet Take 81 mg by mouth daily       Allergies: Patient has no known allergies.   Past Medical History:   Diagnosis Date    Arthritis     Chronic kidney disease     CKD (chronic kidney disease) stage V requiring chronic dialysis ([facility] 12/19/2019    COVID-19 08/2021    Diabetes mellitus ([facility]     [facility]     Digestive disorder     \" slow digestion \"    Hemodialysis patient ([facility]     dialysis on mon, wed, and friday at [facility]    Hyperlipemia     Hypertension     Legally blind     Neuropathy     feet     Past Surgical History:   Procedure Laterality Date    CATARACT REMOVAL      DIALYSIS FISTULA CREATION Left 2/4/2020    LIGATION OF LEFT RADIAL ARTERY TO LEFT CEPHALIC VEIN HEMODIALYSIS FISTULA; CREATION OF LEFT BRACHIAL ARTERY TO LEFT CEPHALIC VEIN PRIMARY ARTERIO-VENOUS FISTULA performed by Rehan Dasilva MD at [facility] Right 2/4/2020    INSERTION OF RIGHT INTERNAL JUGULAR HEMODIALYSIS CATHETER performed by Rehan Dasilva MD at [facility] 7/2/2018    ARM RADICAL/ CEPHALIC AV FISTULA performed by Fantasma Sky MD at [address] [place] [place] [address]  12/13/2018    [facility]. Left upper fistulograms, BA distal forearm cephalic vein [id number] cutter,coil branch (5x3(2)    VASCULAR SURGERY  03/28/2019    [facility]. Left upper fistulograms/venograms.     VASCULAR SURGERY  02/04/2020    [facility] Left brachial artery to Cephalic Vein Arteriovenous Fistula Creation, Ligation of distal forearm cephallic vein near the radial cephallic arteriovenous anastomosis, Ultrasound-guided cannulation of right internal jugular vein a placement of right internal jugular vein tunneled dialysis catheter (glidepath 23 cm tip to cuff)    VASCULAR SURGERY  02/04/2020    [facility]  Left brachial artery Cephalic Vein Arteriovenous Fistula Creation, Ligation of distal forearm cephalic vein near the radial cephalic arteriovenous anastomosis, Ultrasound-guided cannulation of right internal juhular vein and placement of right internal jugular vein tunneled dialysis catheter (glidepath 23 cm tip to cuff)     VASCULAR SURGERY  04/02/2020    [facility] removal of tunneled dialysis catheter right internal jugular vein     Family History   Problem Relation Age of Onset    Cancer Mother     Other Mother         COPD    Other Father         tractor accident     Social History     Tobacco Use    Smoking status: Never    Smokeless tobacco: Never   Substance Use Topics    Alcohol use: No         Review of Systems    Constitutional - no significant activity change, appetite change, or unexpected weight change. No fever or chills. No diaphoresis or significant fatigue. Eyes - no sudden vision change or amaurosis. Respiratory - no significant shortness of breath, wheezing, or stridor. No apnea, cough, or chest tightness associated with shortness of breath. Cardiovascular - no chest pain, syncope, or significant dizziness. No palpitations or significant leg swelling. No claudication. Gastrointestinal - no abdominal swelling or pain. No blood in stool. No severe constipation, diarrhea, nausea, or vomiting. Genitourinary - No dysuria, frequency, or urgency. No flank pain or hematuria. Musculoskeletal - no back pain, gait disturbance, or myalgia. Skin - no color change, rash, pallor, or new wound. Neurologic - no dizziness, facial asymmetry, or light headedness. No seizures. No speech difficulty or lateralizing weakness. Hematologic - no easy bruising or excessive bleeding. Psychiatric - no severe anxiety or nervousness. No confusion. All other review of systems are mentioned in the HPI. Physical Exam    BP (!) 159/84   Pulse 63   Temp 97.8 °F (36.6 °C) (Oral)   Resp 16   Ht 5' 11\" (1.803 m)   Wt 185 lb (83.9 kg)   SpO2 93%   BMI 25.80 kg/m²     Constitutional - well developed, well nourished. No diaphoresis or acute distress. Cardiovascular - Regular rate and rhythm. 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 There is a clot over what appears to be a 2 mm ulcer. No edema in the arm, hand perfused fairly well, but no palpable radial pulse. He had in the past a distal radial cephalic fistula with scars over  this area. Pulmonary - effort appears normal.  No respiratory distress. Lungs - Breath sounds normal. No wheezes or rales. Neurologic - alert and oriented X 3. Physiologic.   Psychiatric - mood, affect, and behavior appear normal.  Judgment and thought processes appear normal.      Assessment    1. Malfunction of arteriovenous dialysis fistula, initial encounter [facility])        The patient is having problems with their left upper extremity arteriovenous fistula as described above. Plan    I plan left upper extremity aterivenous fistulograms/venograms with possible balloon angioplasty and/or stent placement. I will have to perform a fistula revision as well because of ulcer and thin skin over fistula aneurysm, risk of rupture. Therefore, I will place tunneled dialysis catheter as well. I have discussed the procedure, including the risks, benefits, and alternatives to the patient and/or family members. They seem to understand and agree to proceed.

## 2022-11-18 NOTE — CARE COORDINATION
Patient Contact Information:    [address]  [phone] (home)   Telephone Information:   Mobile [phone]     Above information verified? [x]   Yes  []   No      Emergency Contacts:    Extended Emergency Contact Information  Primary Emergency Contact: Iwona Churchill  Address: [address] [place] [address] Patricia Webb [address] Phone: [phone]  Mobile Phone: [phone]  Relation: Child      Have you been vaccinated for COVID-19 (SARS-CoV-2)? []   Yes  [x]   No                   If so, when? Which :         []   Pfizer-BioNTech  []   Moderna  []   Brena Glass  []   Other:         Pharmacy:    [address], [address] [facility] [phone] Jennifer Goss [phone]  [address] [address]  Phone: [phone] Fax: [phone]          Patient Deficits:    []   Yes   [x]   No    If yes:    []   Confusion/Memory  []   Visual  []   Motor/Sensory         []   Right arm         []   Right leg         []   Left arm         []   Left leg  []   Language/Speech         []   Aphasia         []   Dysarthria         []   Swallow         Martin Coma Scale  Eye Opening: Spontaneous  Best Verbal Response: Oriented  Best Motor Response: Obeys commands  Martin Coma Scale Score: 15    Patient Deficit Notes:   Sw met with pt at bedside. Pt stated that he currently has a walker without the wheels and stated that a walker with wheels would be better or a rollator.        11/18/22 0850   Service Assessment   Patient Orientation Alert and Oriented   Cognition Alert   History Provided By Patient   Primary Billy Delgado   Patient's Healthcare Decision Maker is: Legal Next of Kin   PCP Verified by CM Yes   Last Visit to PCP Within last 3 months   Prior Functional Level Assistance with the following:;Housework;Cooking   Current Functional Level Assistance with the following:;Cooking;Housework   Can patient return to prior living arrangement Yes   Ability to make needs known: Good   Family able to assist with home care needs: Yes   Would you like for me to discuss the discharge plan with any other family members/significant others, and if so, who? Yes  (Daughter)   Financial Resources Medicare;Medicaid   Community Resources   (Denied Needs)   CM/SW Referral   (Denied Needs)   Social/Functional History   Lives With Daughter;Friend(s)   Type of [address] One level   Home Access Level entry   [address]   Bathroom Toilet Standard   Bathroom Equipment Shower chair   [address] [facility] [place];Cane, quad;Wheelchair-manual   Receives Help From [facility] Responsibilities No   Ambulation Assistance Independent   Transfer Assistance Independent   Active  No   Patient's  Info Daughter   Mode of Transportation Car   Occupation Retired   Discharge Planning   Type of [facility] Prior To Admission None   Potential Assistance Needed N/A   DME Ordered?  No   Potential Assistance Purchasing Medications No   Type of Home Care Services None   Patient expects to be discharged to: House   One/Two Story Residence One story   Services At/After Discharge   Transition of Care Consult (CM Consult)   (Denied Needs)   Services At/After Discharge None   Mode of Transport at Discharge Other (see comment)  (Daughter)   Confirm Follow Up Transport Family  (Daughter)

## 2022-11-18 NOTE — ED PROVIDER NOTES
[facility] EMERGENCY DEPT  eMERGENCY dEPARTMENT eNCOUnter      Pt Name: Cece Eugene  MRN: [id number]  [place] 1955  Date of evaluation: 11/18/2022  Provider: Gonzales Carter MD    CHIEF COMPLAINT       Chief Complaint   Patient presents with    Vascular Access Problem     Pt was at dialysis this morning when his fistula clotted and he was unable to receive dialysis tx. Pt originally had fistula placed here by Dr. Carmen Hernandez. Pt does dialysis MWF. HISTORY OF PRESENT ILLNESS   (Location/Symptom, Timing/Onset,Context/Setting, Quality, Duration, Modifying Factors, Severity)  Note limiting factors. Cece Eugene is a 79 y.o. male who presents to the emergency department due to malfunction with AV fistula. Patient on dialysis. Dialysis Monday Wednesday Friday. Has AV fistula in his left upper arm. We went to dialysis today and they attempted to access his fistula clotted off and they were unable to do his dialysis and sent him here. He denies any pain or injuries of any kind. No arm swelling. No numbness. No weakness. Tells me he has had this fistula for several years. Last dialysis was Friday. No symptoms of any kind. No difficulty breathing. No arm pain or swelling. No numbness. No weakness. HPI    NursingNotes were reviewed. REVIEW OF SYSTEMS    (2-9 systems for level 4, 10 or more for level 5)     Review of Systems   Constitutional:  Negative for fever. Respiratory:  Negative for shortness of breath. Cardiovascular:  Negative for chest pain and palpitations. Gastrointestinal:  Negative for abdominal pain, diarrhea and vomiting. Genitourinary:  Negative for dysuria. Skin:  Negative for rash. Neurological:  Negative for weakness, numbness and headaches. All other systems reviewed and are negative. A complete review of systems was performed and is negative except as noted above in the HPI.        PAST MEDICAL HISTORY     Past Medical History:   Diagnosis Date Arthritis     Chronic kidney disease     CKD (chronic kidney disease) stage V requiring chronic dialysis ([facility] 12/19/2019    COVID-19 08/2021    Diabetes mellitus ([facility]     Diabetic retinopathy ([facility]     Digestive disorder     \" slow digestion \"    Hemodialysis patient ([facility]     dialysis on mon, wed, and friday at [facility]    Hyperlipemia     Hypertension     Legally blind     Neuropathy     feet         SURGICAL HISTORY       Past Surgical History:   Procedure Laterality Date    CATARACT REMOVAL      DIALYSIS FISTULA CREATION Left 2/4/2020    LIGATION OF LEFT RADIAL ARTERY TO LEFT CEPHALIC VEIN HEMODIALYSIS FISTULA; CREATION OF LEFT BRACHIAL ARTERY TO LEFT CEPHALIC VEIN PRIMARY ARTERIO-VENOUS FISTULA performed by Petra Jennings MD at [facility] 2/4/2020    INSERTION OF RIGHT INTERNAL JUGULAR HEMODIALYSIS CATHETER performed by Petra Jennings MD at [facility] 7/2/2018    ARM RADICAL/ CEPHALIC AV FISTULA performed by Maryuri Ortega MD at [address] [place] [address]  12/13/2018    S. Left upper fistulograms, BA distal forearm cephalic vein 3B61 cutter,coil branch (5x3(2)    VASCULAR SURGERY  03/28/2019    [facility]. Left upper fistulograms/venograms.     VASCULAR SURGERY  02/04/2020    [facility] Left brachial artery to Cephalic Vein Arteriovenous Fistula Creation, Ligation of distal forearm cephallic vein near the radial cephallic arteriovenous anastomosis, Ultrasound-guided cannulation of right internal jugular vein a placement of right internal jugular vein tunneled dialysis catheter (glidepath 23 cm tip to cuff)    VASCULAR SURGERY  02/04/2020    [facility]  Left brachial artery Cephalic Vein Arteriovenous Fistula Creation, Ligation of distal forearm cephalic vein near the radial cephalic arteriovenous anastomosis, Ultrasound-guided cannulation of right internal juhular vein and placement of right internal jugular vein tunneled dialysis catheter (glidepath 23 cm tip to cuff)     VASCULAR SURGERY  04/02/2020    [facility] removal of tunneled dialysis catheter right internal jugular vein         CURRENT MEDICATIONS       Previous Medications    ACETAMINOPHEN (TYLENOL) 500 MG TABLET    Take 500 mg by mouth every 6 hours as needed for Pain    ASPIRIN 81 MG TABLET    Take 81 mg by mouth daily    ATORVASTATIN (LIPITOR) 80 MG TABLET    Take 80 mg by mouth nightly    B COMPLEX VITAMINS CAPSULE    Take 1 capsule by mouth daily    DIPHENHYDRAMINE (BENADRYL) 50 MG CAPSULE    Take 100 mg by mouth every 6 hours as needed for Itching    ERGOCALCIFEROL (VITAMIN D2 PO)    Take 1,250 mcg by mouth once a week Takes on Wednesday    FERRIC CITRATE (AURYXIA) 1  MG(FE) TABS TABLET    Take by mouth 3 times daily (with meals)    INSULIN ASPART PROTAMINE-INSULIN ASPART (NOVOLOG 70/30) (70-30) 100 UNIT/ML INJECTION    Inject 10 Units into the skin 2 times daily (with meals) 10 - 25 UNITS ONCE OR TWICE A DAY    MELATONIN 3 MG TABS TABLET    Take 10 mg by mouth nightly as needed     METOCLOPRAMIDE (REGLAN) 5 MG TABLET    Take 5 mg by mouth 4 times daily    METOPROLOL SUCCINATE (TOPROL XL) 50 MG EXTENDED RELEASE TABLET    Take 1 tablet by mouth daily    MIDODRINE (PROAMATINE) 5 MG TABLET    Take 10 mg by mouth as needed DURING DIALYSIS IF NEEDED    SEVELAMER (RENVELA) 800 MG TABLET    Take 1 tablet by mouth Daily with supper       ALLERGIES     Patient has no known allergies.     FAMILY HISTORY       Family History   Problem Relation Age of Onset    Cancer Mother     Other Mother         COPD    Other Father         tractor accident          SOCIAL HISTORY       Social History     Socioeconomic History    Marital status:      Spouse name: None    Number of children: None    Years of education: None    Highest education level: None   Tobacco Use    Smoking status: Never    Smokeless tobacco: Never   Vaping Use    Vaping Use: Never used   Substance and Sexual Activity    Alcohol use: No    Drug use: No       SCREENINGS    Bryn Coma Scale  Eye Opening: Spontaneous  Best Verbal Response: Oriented  Best Motor Response: Obeys commands  Bryn Coma Scale Score: 15        PHYSICAL EXAM    (up to 7 for level 4, 8 or more for level 5)     ED Triage Vitals [11/18/22 0752]   BP Temp Temp Source Heart Rate Resp SpO2 Height Weight   (!) 154/76 97.8 °F (36.6 °C) Oral 60 18 95 % 5' 11\" (1.803 m) 185 lb (83.9 kg)       Physical Exam  Vitals reviewed. Constitutional:       General: He is not in acute distress. Appearance: He is well-developed. HENT:      Head: Normocephalic and atraumatic. Eyes:      General: No scleral icterus. Pupils: Pupils are equal, round, and reactive to light. Neck:      Vascular: No JVD. Cardiovascular:      Rate and Rhythm: Normal rate and regular rhythm. Heart sounds: Normal heart sounds. Pulmonary:      Effort: Pulmonary effort is normal. No respiratory distress. Breath sounds: Normal breath sounds. Abdominal:      General: There is no distension. Palpations: Abdomen is soft. Tenderness: There is no abdominal tenderness. There is no guarding or rebound. Musculoskeletal:         General: No tenderness. Left hand: No swelling. Normal range of motion. Normal strength. Normal sensation. Normal capillary refill. Abnormal pulse. Arms:       Cervical back: Normal range of motion and neck supple. Right lower leg: No edema. Left lower leg: No edema. Comments: Patient has decreased pulse left radial artery. Patient tells me this is typical for him. He has a scar in this area and tells me that this is where he previously had an AV fistula and that typically cannot palpate a pulse in his wrist.  No numbness. No weakness. Cap refill appears normal.  He is very clear that this is normal for him. Skin:     General: Skin is warm and dry. Capillary Refill: Capillary refill takes less than 2 seconds.    Neurological:      Mental Status: He is alert and oriented to person, place, and time. Psychiatric:         Behavior: Behavior normal.       DIAGNOSTIC RESULTS     EKG: All EKG's are interpreted by the Emergency Department Physician who either signs or Co-signs this chart in the absence of a cardiologist.        RADIOLOGY:   Non-plain film images such as CT, Ultrasound and MRI are read by the radiologist. Bart Bustamante images are visualized and preliminarily interpreted by the emergency physician with the below findings:        Interpretation per the Radiologist below, if available at the time of this note:    No orders to display         ED BEDSIDE ULTRASOUND:   Performed by ED Physician - none    LABS:  Labs Reviewed   CBC - Abnormal; Notable for the following components:       Result Value    WBC 11.0 (*)     Hemoglobin 13.4 (*)     MCHC 29.9 (*)     RDW 16.9 (*)     All other components within normal limits   COVID-19, RAPID   BASIC METABOLIC PANEL W/ REFLEX TO MG FOR LOW K   PROTIME-INR       All other labs were within normal range or not returned as of this dictation. EMERGENCY DEPARTMENT COURSE and DIFFERENTIALDIAGNOSIS/MDM:   Vitals:    Vitals:    11/18/22 0752   BP: (!) 154/76   Pulse: 60   Resp: 18   Temp: 97.8 °F (36.6 °C)   TempSrc: Oral   SpO2: 95%   Weight: 185 lb (83.9 kg)   Height: 5' 11\" (1.803 m)       MDM    Case discussed with Dr. Steward Ear, vascular surgery. He will see in consult with plans for surgery this morning. Requested we admit to the hospitalist.  Patient resting comfortably and updated about plan. Patient's case discussed with Shannan Field with hospitalist service who is agreeable plan of care and admission. She will follow-up on pending labs. Patient resting comfortably and updated about plan. CONSULTS:  IP CONSULT TO VASCULAR SURGERY    PROCEDURES:  Unless otherwise notedbelow, none     Procedures    FINAL IMPRESSION     1.  Malfunction of arteriovenous dialysis fistula, initial encounter ([facility] DISPOSITION/PLAN   DISPOSITION Decision To Admit 11/18/2022 08:20:22 AM      PATIENT REFERRED TO:  @FUP@    DISCHARGE MEDICATIONS:  New Prescriptions    No medications on file          (Please note that portions of this note were completed with a voice recognition program.  Efforts were made to edit the dictations butoccasionally words are mis-transcribed.)    Aubree Verde MD (electronically signed)  AttendingEmerMercy Hospital Pariscy Physician          Aubree Verde MD  11/18/22 0546

## 2022-11-18 NOTE — PROGRESS NOTES
Automatic Dose Adjustment of                Subcutaneous Anticoagulant for Prophylaxis    Minh Coronado is a 79 y.o. male. Recent Labs     11/18/22  0809   CREATININE 8.2*       Estimated Creatinine Clearance: 9 mL/min (A) (based on SCr of 8.2 mg/dL (H)). Weight:  Wt Readings from Last 1 Encounters:   11/18/22 185 lb (83.9 kg)           Pharmacy has adjusted subcutaneous anticoagulant for prophylaxis to Heparin 5000 units SC three times daily based on the patient's weight and estimated CrCl per [facility] policy.                Electronically signed by Kaylie Morales, [address] [place] [address] on 11/18/2022 at 9:36 AM

## 2022-11-18 NOTE — OP NOTE
Preoperative diagnosis:  1. End-stage renal disease on hemodialysis  2. Left brachial artery to cephalic vein arteriovenous fistula malfunction (nurses unable to cannulate and pulling clots this morning in dialysis)  3. Bleeding in the left brachial artery to cephalic vein arteriovenous fistula over an aneurysmal section in the cephalic vein with thin skin and ulceration    Postoperative diagnosis: Same    Operative procedure:  1. Left upper extremity fistulogram's including venography to the superior vena cava  2. Open revision of left brachial artery cephalic vein arteriovenous fistula with 7 mm Artegraft placement from the distal upper arm cephalic vein to the axillary vein  3. Ultrasound-guided cannulation right internal jugular vein  4. Right internal jugular vein tunneled dialysis catheter placement (Bard glidepath 19 cm)    Surgeon: Oli Neri MD    Anesthesia: General    Estimated blood loss: 50 mL    Findings:  1. The left brachial artery to cephalic vein arteriovenous fistula is actually patent. The arteriovenous anastomosis is widely patent. There is a fairly large aneurysmal section in the cephalic vein extending at least 10 cm in length. There is an 80 to 90% stenosis proximal to this in the cephalic vein. There is also stenosis at the distal and proximal end of the cephalic arch stent. 2.  The right internal jugular vein is patent and the catheter was placed without difficulty with the tip in the right atrium/superior to cava junction. The catheter is ready for use. Procedure in detail:    After the patient was consented and given intravenous antibiotics, is brought to the hybrid operating room placed on the table supine position. General anesthesia was achieved and the patient's left arm and bilateral neck and chest were prepped and draped in usual sterile procedure. Micropuncture needle was used to cannulate the distal upper arm cephalic vein.   Seldinger technique was utilized to place a 4 [place] glide sheath. Left upper extremity fistulogram's including venography the supra Zamora were performed with above findings. Because of the aneurysmal section with the thin overlying skin and ulcer with clot, I decided to perform an open revision with graft. I also chose to use axillary vein outflow instead of the cephalic vein outflow because of the cephalic vein stent stenoses. The 4 [place] sheath was removed and pressure applied for hemostasis. An incision was made in the distal upper arm with knife over the cephalic vein. Cephalic vein was circumferentially dissected. Another incision was then made in the axilla with knife. The axillary vein was dissected circumferentially. A 7 mm Artegraft was rinsed and rifampin solution and then marked for orientation purposes and then a superficial subcutaneous tunnel was created between the distal upper arm incision in the proximal/axillary vein incision. The patient was given intravenous heparin. A brachial clamp was placed on the distal cephalic vein. The proximal cephalic vein was ligated with 2-0 Vicryl suture. The vein was then transected with scissors. An end vein to end Artegraft anastomosis was created with 6-0 Prolene running suture. The clamp was released and the anastomosis checked and it was hemostatic. The brachial clamp was again placed and then the graft was irrigated with heparinized saline. A Satinsky clamp was placed on the axillary vein and then the graft was cut to length and tapered. A longitudinal venotomy was made in the axillary vein with 11 blade Wilson scissors. An end Artegraft to side vein anastomosis was created with 6-0 Prolene running suture. Prior to completing this anastomosis, standard flushing techniques were used to remove air and debris from the native vessels and flow was restored through the graft. Hemostasis was excellent.     Seldinger technique was utilized to place another 4 Amhar[place] glide sheath in the distal upper arm cephalic vein. Left upper extremity fistulogram/AV graft angiograms including venography the supra Zamora were performed. There are some mild to moderate stenoses in the axillary and subclavian vein, but they do not appear to be flow-limiting and the residual lumen appears to be at least 6 mm even with the stenosis. The sheath was removed and pressure applied for hemostasis. The heparin was partially reversed with protamine. Both wounds were irrigated with saline. Hemostasis is excellent. The wounds were closed with 3-0 PDS subcutaneous sutures, 3-0 nylon interrupted vertical mattress sutures, Dermabond skin adhesive. Micropuncture needle was used to cannulate the right internal jugular vein under ultrasound guidance. Seldinger technique was utilized place an 035 wire down into the inferior vena cava under fluoroscopic visualization. An incision was made in the right neck with knife over the wire and another incision made in the right chest with wire. The catheter was tunneled subcutaneously from the chest wound to the neck wound. A series of dilators and finally dilating catheter and tear-away sheath were placed over the wire without resistance under fluoroscopic visualization. The wire and dilator were removed and then the catheter was placed through the tear-away sheath and down to the right atrium. The tear-away sheath was removed and then the catheter was withdrawn until the tip is in the right atrium/supra Zamora junction. Both ports aspirated and flushed easily with heparinized saline Hep-Lock. The right neck wound was closed in layers with 3-0 Vicryl subcutaneous sutures, 3-0 nylon interrupted sutures, Dermabond skin adhesive. The exit site was secured around the catheter with 3-0 Vicryl subcutaneous pursestring suture. The catheter itself was secured to the patient's chest with two 2-0 nylon sutures.   Biopatch was placed at the exit site and sterile dressings applied. The patient tolerated the procedure well. He was extubated, brought to the recovery room in fair condition having tolerated the procedure well. Again, his tunneled dialysis catheter is ready for use. We will have to use this until his arm is completely healed and sutures were removed. It will likely be about a month to 6 weeks before his arm can be used for dialysis.

## 2022-11-18 NOTE — CONSULTS
Nephrology ([facility]) Consult Note      Patient:  Moy Xiao  YOB: 1955  Date of Service: 11/18/2022  MRN: [id number]   Acct: [de-identified]   Primary Care Physician: Debo Sands MD  Advance Directive: Full Code  Admit Date: 11/18/2022       Hospital Day: 1  Referring Provider: Jacklyn Charlton MD    Patient independently seen and examined, Chart, Consults, Notes, Operative notes, Labs, Cardiology, and Radiology studies reviewed as available. Subjective:  Moy Xiao is a 79 y.o. male for whom we were consulted for evaluation and treatment of end-stage renal disease. Monday Wednesday Friday dialysis patient to present to the hospital for evaluation of fistula difficulties. He was taken to the OR the morning of 11/18 by Dr. Lisa Noriega with ultimate PermCath placement and upper extremity graft placement. He was initially seen on dialysis and tolerated without complaint. He denied current chest pain, shortness of air at rest, nausea or vomiting. Dialysis   Pt was seen on renal replacement therapy and I have personally seen and evaluated the patient and directed the therapy  Modality: Hemodialysis  Access: Catheter  Location: right upper  QB: 450  QD: 700  UF: 860      Allergies:  Patient has no known allergies.     Medicines:  Current Facility-Administered Medications   Medication Dose Route Frequency Provider Last Rate Last Admin    sodium chloride flush 0.9 % injection 5-40 mL  5-40 mL IntraVENous 2 times per day Pardeep Fischer MD        sodium chloride flush 0.9 % injection 5-40 mL  5-40 mL IntraVENous PRN Pardeep Fischer MD        polyethylene glycol (GLYCOLAX) packet 17 g  17 g Oral Daily PRN Pardeep Fischer MD        acetaminophen (TYLENOL) tablet 650 mg  650 mg Oral Q6H PRN Pardeep Fischer MD        Or    acetaminophen (TYLENOL) suppository 650 mg  650 mg Rectal Q6H PRN Pardeep Fischer MD        heparin (porcine) injection 5,000 Units  5,000 Units SubCUTAneous 3 times per day Bianka Boyd MD        aspirin chewable tablet 81 mg  81 mg Oral Daily Bianka Boyd MD        atorvastatin (LIPITOR) tablet 80 mg  80 mg Oral Nightly Bianka Boyd MD        vitamin B and C (TOTAL B-C) 1 tablet  1 tablet Oral Daily Bianka Boyd MD        diphenhydrAMINE (BENADRYL) tablet 50 mg  50 mg Oral Q6H PRN Bianka Boyd MD        melatonin disintegrating tablet 10 mg  10 mg Oral Nightly PRN Bianka Boyd MD        metoclopramide (REGLAN) tablet 5 mg  5 mg Oral 4x Daily Bianka Boyd MD        metoprolol succinate (TOPROL XL) extended release tablet 50 mg  50 mg Oral Daily Bianka Boyd MD        sevelamer (RENVELA) tablet 800 mg  800 mg Oral Rosana Sylvester MD        sodium chloride flush 0.9 % injection 5-40 mL  5-40 mL IntraVENous 2 times per day Bianka Boyd MD        sodium chloride flush 0.9 % injection 5-40 mL  5-40 mL IntraVENous PRN Bianka Boyd MD        0.9 % sodium chloride infusion   IntraVENous PRN Bianka Boyd MD        metoprolol tartrate (LOPRESSOR) tablet 25 mg  25 mg Oral Q12H PRN Bianka Boyd MD        cloNIDine (CATAPRES) tablet 0.1 mg  0.1 mg Oral Q2H PRN Bianka Boyd MD        oxyCODONE (ROXICODONE) immediate release tablet 5 mg  5 mg Oral Q6H PRN Bianka Boyd MD        HYDROmorphone HCl PF (DILAUDID) injection 0.25 mg  0.25 mg IntraVENous Q3H PRN Bianka Boyd MD        Or    HYDROmorphone HCl PF (DILAUDID) injection 0.5 mg  0.5 mg IntraVENous Q3H PRN Bianka Boyd MD        ondansetron (ZOFRAN-ODT) disintegrating tablet 4 mg  4 mg Oral Q8H PRN Bianka Boyd MD        Or    ondansetron [facility]) injection 4 mg  4 mg IntraVENous Q6H PRN Bianka Boyd MD        hydrALAZINE (APRESOLINE) injection 10 mg  10 mg IntraVENous Q1H PRN Bianka Boyd MD           Past Medical History:  Past Medical History:   Diagnosis Date    Arthritis     Chronic kidney disease     CKD (chronic kidney disease) stage V requiring chronic dialysis ([facility] [address] 12/19/2019    COVID-19 08/2021    Diabetes mellitus ([facility] [address]     Diabetic retinopathy ([facility] [address]     Digestive disorder     \" slow digestion \"    Hemodialysis patient ([facility] [address]     dialysis on mon, wed, and friday at [facility]    Hyperlipemia     Hypertension     Legally blind     Neuropathy     feet       Past Surgical History:  Past Surgical History:   Procedure Laterality Date    CATARACT REMOVAL      DIALYSIS FISTULA CREATION Left 2/4/2020    LIGATION OF LEFT RADIAL ARTERY TO LEFT CEPHALIC VEIN HEMODIALYSIS FISTULA; CREATION OF LEFT BRACHIAL ARTERY TO LEFT CEPHALIC VEIN PRIMARY ARTERIO-VENOUS FISTULA performed by Bill Alston MD at [facility] 2/4/2020    INSERTION OF RIGHT INTERNAL JUGULAR HEMODIALYSIS CATHETER performed by Bill Alston MD at [facility] 7/2/2018    ARM RADICAL/ CEPHALIC AV FISTULA performed by Sarahi Lopez MD at [address] [place] [address]  12/13/2018    S. Left upper fistulograms, BA distal forearm cephalic vein 2D59 cutter,coil branch (5x3(2)    VASCULAR SURGERY  03/28/2019    SJS. Left upper fistulograms/venograms.     VASCULAR SURGERY  02/04/2020    SJS Left brachial artery to Cephalic Vein Arteriovenous Fistula Creation, Ligation of distal forearm cephallic vein near the radial cephallic arteriovenous anastomosis, Ultrasound-guided cannulation of right internal jugular vein a placement of right internal jugular vein tunneled dialysis catheter (glidepath 23 cm tip to cuff)    VASCULAR SURGERY  02/04/2020    SJS  Left brachial artery Cephalic Vein Arteriovenous Fistula Creation, Ligation of distal forearm cephalic vein near the radial cephalic arteriovenous anastomosis, Ultrasound-guided cannulation of right internal juhular vein and placement of right internal jugular vein tunneled dialysis catheter (glidepath 23 cm tip to cuff)     VASCULAR SURGERY  04/02/2020    SJS removal of tunneled dialysis catheter right internal jugular vein       Family History  Family History   Problem Relation Age of Onset    Cancer Mother     Other Mother         COPD    Other Father         tractor accident       Social History  Social History     Socioeconomic History    Marital status:      Spouse name: Not on file    Number of children: Not on file    Years of education: Not on file    Highest education level: Not on file   Occupational History    Not on file   Tobacco Use    Smoking status: Never    Smokeless tobacco: Never   Vaping Use    Vaping Use: Never used   Substance and Sexual Activity    Alcohol use: No    Drug use: No    Sexual activity: Not on file   Other Topics Concern    Not on file   Social History Narrative    Not on file     Social Determinants of Health     Financial Resource Strain: Not on file   Food Insecurity: Not on file   Transportation Needs: Not on file   Physical Activity: Not on file   Stress: Not on file   Social Connections: Not on file   Intimate Partner Violence: Not on file   Housing Stability: Not on file         Review of Systems:  History obtained from chart review and the patient  General ROS: No fever or chills  Respiratory ROS: No cough, shortness of breath, wheezing  Cardiovascular ROS: No chest pain or palpitations  Gastrointestinal ROS: No abdominal pain or melena  Genito-Urinary ROS: No dysuria or hematuria  Musculoskeletal ROS: No joint pain or swelling   14 point ROS reviewed with the patient and negative except as noted above and in the HPI unless unable to obtain.     Objective:  Patient Vitals for the past 24 hrs:   BP Temp Temp src Pulse Resp SpO2 Height Weight   11/18/22 1409 (!) 126/58 -- -- 62 16 100 % -- --   11/18/22 1400 (!) 115/51 -- -- 62 14 92 % -- --   11/18/22 1355 113/60 97.5 °F (36.4 °C) -- 62 15 95 % -- --   11/18/22 1350 113/60 -- -- 62 12 94 % -- --   11/18/22 1345 -- -- -- 62 17 94 % -- --   11/18/22 1340 (!) 98/40 -- -- 62 15 93 % -- --   11/18/22 1335 (!) 103/49 -- -- 61 11 90 % -- --   11/18/22 1334 -- 97.4 °F (36.3 °C) -- -- -- -- -- --   11/18/22 1330 (!) 98/46 97.4 °F (36.3 °C) Temporal 64 12 92 % -- --   11/18/22 1050 -- -- -- 62 19 98 % -- --   11/18/22 1035 (!) 169/75 -- -- 63 13 97 % -- --   11/18/22 0835 (!) 159/84 -- -- 63 16 93 % -- --   11/18/22 0752 (!) 154/76 97.8 °F (36.6 °C) Oral 60 18 95 % 5' 11\" (1.803 m) 185 lb (83.9 kg)       Intake/Output Summary (Last 24 hours) at 11/18/2022 1536  Last data filed at 11/18/2022 1101  Gross per 24 hour   Intake 250 ml   Output --   Net 250 ml     General: awake/alert   Chest:  clear to auscultation bilaterally  CVS: regular rate and rhythm  Abdominal: soft, nontender, normal bowel sounds  Extremities: no cyanosis or edema  Skin: warm and dry without rash      Labs:  BMP:   Recent Labs     11/18/22  0809      K 4.9   CL 99   CO2 28   BUN 82*   CREATININE 8.2*   CALCIUM 9.1     CBC:   Recent Labs     11/18/22  0809   WBC 11.0*   HGB 13.4*   HCT 44.8   MCV 93.9        LIVER PROFILE: No results for input(s): AST, ALT, LIPASE, BILIDIR, BILITOT, ALKPHOS in the last 72 hours. Invalid input(s): AMYLASE,  ALB  PT/INR:   Recent Labs     11/18/22  0806   PROTIME 13.3   INR 1.02     APTT: No results for input(s): APTT in the last 72 hours. BNP:  No results for input(s): BNP in the last 72 hours. Ionized Calcium:No results for input(s): IONCA in the last 72 hours. Magnesium:No results for input(s): MG in the last 72 hours. Phosphorus:No results for input(s): PHOS in the last 72 hours. HgbA1C: No results for input(s): LABA1C in the last 72 hours. Hepatic: No results for input(s): ALKPHOS, ALT, AST, PROT, BILITOT, BILIDIR, LABALBU in the last 72 hours. Lactic Acid: No results for input(s): LACTA in the last 72 hours. Troponin: No results for input(s): CKTOTAL, CKMB, TROPONINT in the last 72 hours.   ABGs: No results for input(s): PH, PCO2, PO2, HCO3, O2SAT in the last 72 hours.  CRP:  No results for input(s): CRP in the last 72 hours. Sed Rate:  No results for input(s): SEDRATE in the last 72 hours. Cultures:   No results for input(s): CULTURE in the last 72 hours. No results for input(s): BC, Kim Ko in the last 72 hours. No results for input(s): CXSURG in the last 72 hours. Radiology reports as per the Radiologist  Radiology: No results found. Assessment   End-stage renal disease  Hypertension  Diabetes type 2 with diabetic nephropathy  Secondary hyperparathyroidism  Anemia and chronic kidney disease  Vitamin D deficiency  Paroxysmal atrial fibrillation    Plan:  Discussed with patient, nursing, Dr. Cuauhtemoc Mills  Work-up ordered and ongoing  Monitor labs  Complete currently scheduled dialysis treatment, then planned Monday Wednesday Friday      Thank you for the consult, we appreciate the opportunity to provide care to your patients. Feel free to contact me if I can be of any further assistance.       Donal Villarreal MD  11/18/22  3:36 PM

## 2022-11-18 NOTE — H&P
[address] - History & Physical      PCP: Marko Quintana MD    Date of Admission: 11/18/2022    Date of Service: 11/18/2022    Chief Complaint:  AV fistula malfunction     History Of Present Illness: The patient is a 79 y.o. male with past medical history of ESRD on HD MWF, T2DM, HTN, and hyperlipidemia who presented to [facility] ED complaining of AV Fistula malfunction. Patient was sent from dialysis due to malfunctioning AV fistula. Last dialysis on Wednesday. Denied any other symptoms at this time. No arm swelling. Denied SOB or chest pain. Workup in ED revealed stable vital signs, BUN 82, Cr 8.2, WBC 11.0, Hgb 13.4. ED physician discussed case with Dr. Aníbal Matthews. Patient was admitted to hospital medicine for AV fistula malfunction with vascular surgery consultation. Nephrology was consulted for ESRD management.          Past Medical History:        Diagnosis Date    Arthritis     Chronic kidney disease     CKD (chronic kidney disease) stage V requiring chronic dialysis ([facility] [address] 12/19/2019    COVID-19 08/2021    Diabetes mellitus ([facility] [facility]     Diabetic retinopathy ([address]     Digestive disorder     \" slow digestion \"    Hemodialysis patient ([facility] [address]     dialysis on mon, wed, and friday at [facility]    Hyperlipemia     Hypertension     Legally blind     Neuropathy     feet       Past Surgical History:        Procedure Laterality Date    CATARACT REMOVAL      DIALYSIS FISTULA CREATION Left 2/4/2020    LIGATION OF LEFT RADIAL ARTERY TO LEFT CEPHALIC VEIN HEMODIALYSIS FISTULA; CREATION OF LEFT BRACHIAL ARTERY TO LEFT CEPHALIC VEIN PRIMARY ARTERIO-VENOUS FISTULA performed by Lucy Zhu MD at [facility] Right 2/4/2020    INSERTION OF RIGHT INTERNAL JUGULAR HEMODIALYSIS CATHETER performed by Lucy Zhu MD at [facility] Left 7/2/2018    ARM RADICAL/ CEPHALIC AV FISTULA performed by Oliver Wheeler MD at [address] [place] [address] 12/13/2018    SJS. Left upper fistulograms, BA distal forearm cephalic vein 5L37 cutter,coil branch (5x3(2)    VASCULAR SURGERY  03/28/2019    SJS. Left upper fistulograms/venograms. VASCULAR SURGERY  02/04/2020    SJS Left brachial artery to Cephalic Vein Arteriovenous Fistula Creation, Ligation of distal forearm cephallic vein near the radial cephallic arteriovenous anastomosis, Ultrasound-guided cannulation of right internal jugular vein a placement of right internal jugular vein tunneled dialysis catheter (glidepath 23 cm tip to cuff)    VASCULAR SURGERY  02/04/2020    SJS  Left brachial artery Cephalic Vein Arteriovenous Fistula Creation, Ligation of distal forearm cephalic vein near the radial cephalic arteriovenous anastomosis, Ultrasound-guided cannulation of right internal juhular vein and placement of right internal jugular vein tunneled dialysis catheter (glidepath 23 cm tip to cuff)     VASCULAR SURGERY  04/02/2020    SJS removal of tunneled dialysis catheter right internal jugular vein       Home Medications:  Prior to Admission medications    Medication Sig Start Date End Date Taking?  Authorizing Provider   metoprolol succinate (TOPROL XL) 50 MG extended release tablet Take 1 tablet by mouth daily 10/1/22   Nichol Valdivia MD   b complex vitamins capsule Take 1 capsule by mouth daily    Historical Provider, MD   ferric citrate (AURYXIA) 1  MG(Fe) TABS tablet Take by mouth 3 times daily (with meals)    Historical Provider, MD   vitamin C (ASCORBIC ACID) 500 MG tablet Take 1,000 mg by mouth daily  9/20/22  Historical Provider, MD   atorvastatin (LIPITOR) 80 MG tablet Take 80 mg by mouth nightly    Historical Provider, MD   diphenhydrAMINE (BENADRYL) 50 MG capsule Take 100 mg by mouth every 6 hours as needed for Itching    Historical Provider, MD   midodrine (PROAMATINE) 5 MG tablet Take 10 mg by mouth as needed DURING DIALYSIS IF NEEDED    Historical Provider, MD   sevelamer (RENVELA) 800 MG tablet Take 1 tablet by mouth Daily with supper    Historical Provider, MD   Ergocalciferol (VITAMIN D2 PO) Take 1,250 mcg by mouth once a week Takes on Wednesday    Historical Provider, MD   insulin aspart protamine-insulin aspart (NOVOLOG 70/30) (70-30) 100 UNIT/ML injection Inject 10 Units into the skin 2 times daily (with meals) 10 - 25 UNITS ONCE OR TWICE A DAY    Historical Provider, MD   melatonin 3 MG TABS tablet Take 10 mg by mouth nightly as needed     Historical Provider, MD   acetaminophen (TYLENOL) 500 MG tablet Take 500 mg by mouth every 6 hours as needed for Pain    Historical Provider, MD   metoclopramide (REGLAN) 5 MG tablet Take 5 mg by mouth 4 times daily 5/29/18   Historical Provider, MD   aspirin 81 MG tablet Take 81 mg by mouth daily    Historical Provider, MD   fenofibrate 160 MG tablet Take 160 mg by mouth at bedtime 5/6/18 9/20/22  Historical Provider, MD       Allergies:    Patient has no known allergies. Social History:    The patient currently lives at home   Tobacco:   reports that he has never smoked. He has never used smokeless tobacco.  Alcohol:   reports no history of alcohol use. Illicit Drugs: denies    Family History:      Problem Relation Age of Onset    Cancer Mother     Other Mother         COPD    Other Father         tractor accident           Review of Systems   Constitutional:  Negative for chills, diaphoresis, fatigue and fever. HENT:  Negative for congestion and ear pain. Eyes:  Negative for visual disturbance. Respiratory:  Negative for cough, shortness of breath and wheezing. Cardiovascular:  Negative for chest pain, palpitations and leg swelling. Gastrointestinal:  Negative for abdominal distention, abdominal pain, blood in stool, constipation, diarrhea, nausea and vomiting. Endocrine: Negative for cold intolerance and heat intolerance. Genitourinary:  Negative for difficulty urinating, flank pain, frequency and urgency.    Musculoskeletal:  Negative for arthralgias and myalgias. Skin:  Negative for color change and wound. Neurological:  Negative for dizziness, syncope, weakness, light-headedness, numbness and headaches. Hematological:  Does not bruise/bleed easily. Psychiatric/Behavioral:  Negative for agitation, confusion and dysphoric mood. Physical Examination:  BP (!) 126/58   Pulse 62   Temp 97.5 °F (36.4 °C)   Resp 16   Ht 5' 11\" (1.803 m)   Wt 185 lb (83.9 kg)   SpO2 100%   BMI 25.80 kg/m²     Physical Exam  Constitutional:       General: He is not in acute distress. Appearance: Normal appearance. He is not ill-appearing. HENT:      Head: Normocephalic and atraumatic. Right Ear: External ear normal.      Left Ear: External ear normal.      Nose: Nose normal.      Mouth/Throat:      Mouth: Mucous membranes are moist.   Eyes:      Extraocular Movements: Extraocular movements intact. Conjunctiva/sclera: Conjunctivae normal.      Pupils: Pupils are equal, round, and reactive to light. Cardiovascular:      Rate and Rhythm: Normal rate and regular rhythm. Pulses: Normal pulses. Heart sounds: Normal heart sounds. Pulmonary:      Effort: Pulmonary effort is normal. No respiratory distress. Breath sounds: Normal breath sounds. No wheezing, rhonchi or rales. Chest:      Comments: Right permacath in place   Abdominal:      General: Bowel sounds are normal. There is no distension. Palpations: Abdomen is soft. Tenderness: There is no abdominal tenderness. Musculoskeletal:         General: No swelling, tenderness or deformity. Normal range of motion. Cervical back: Normal range of motion and neck supple. No muscular tenderness. Right lower leg: No edema. Left lower leg: No edema. Comments: S/p AV fistula revision    Skin:     General: Skin is warm and dry. Findings: No bruising or lesion. Neurological:      Mental Status: He is alert and oriented to person, place, and time.

## 2022-11-18 NOTE — PROGRESS NOTES
Consent forms for procedure and anesthesia sent down with surgery techs. They stated that they would have them filled out in surgery holding.

## 2022-11-19 VITALS
HEART RATE: 62 BPM | DIASTOLIC BLOOD PRESSURE: 68 MMHG | SYSTOLIC BLOOD PRESSURE: 105 MMHG | BODY MASS INDEX: 25.9 KG/M2 | RESPIRATION RATE: 17 BRPM | TEMPERATURE: 98.6 F | OXYGEN SATURATION: 97 % | HEIGHT: 71 IN | WEIGHT: 185 LBS

## 2022-11-19 LAB
ANION GAP SERPL CALCULATED.3IONS-SCNC: 17 MMOL/L (ref 7–19)
BASOPHILS ABSOLUTE: 0.1 K/UL (ref 0–0.2)
BASOPHILS RELATIVE PERCENT: 0.5 % (ref 0–1)
BUN BLDV-MCNC: 38 MG/DL (ref 8–23)
CALCIUM SERPL-MCNC: 9.4 MG/DL (ref 8.8–10.2)
CHLORIDE BLD-SCNC: 92 MMOL/L (ref 98–111)
CO2: 28 MMOL/L (ref 22–29)
CREAT SERPL-MCNC: 5.7 MG/DL (ref 0.5–1.2)
EOSINOPHILS ABSOLUTE: 0.2 K/UL (ref 0–0.6)
EOSINOPHILS RELATIVE PERCENT: 1 % (ref 0–5)
GFR SERPL CREATININE-BSD FRML MDRD: 10 ML/MIN/{1.73_M2}
GLUCOSE BLD-MCNC: 193 MG/DL (ref 70–99)
GLUCOSE BLD-MCNC: 212 MG/DL (ref 74–109)
GLUCOSE BLD-MCNC: 262 MG/DL (ref 70–99)
HCT VFR BLD CALC: 44.4 % (ref 42–52)
HEMOGLOBIN: 13.5 G/DL (ref 14–18)
IMMATURE GRANULOCYTES #: 0.1 K/UL
LYMPHOCYTES ABSOLUTE: 2.4 K/UL (ref 1.1–4.5)
LYMPHOCYTES RELATIVE PERCENT: 15.9 % (ref 20–40)
MCH RBC QN AUTO: 28.5 PG (ref 27–31)
MCHC RBC AUTO-ENTMCNC: 30.4 G/DL (ref 33–37)
MCV RBC AUTO: 93.9 FL (ref 80–94)
MONOCYTES ABSOLUTE: 1.7 K/UL (ref 0–0.9)
MONOCYTES RELATIVE PERCENT: 10.9 % (ref 0–10)
NEUTROPHILS ABSOLUTE: 10.9 K/UL (ref 1.5–7.5)
NEUTROPHILS RELATIVE PERCENT: 71.3 % (ref 50–65)
PDW BLD-RTO: 16.9 % (ref 11.5–14.5)
PERFORMED ON: ABNORMAL
PERFORMED ON: ABNORMAL
PLATELET # BLD: 153 K/UL (ref 130–400)
PMV BLD AUTO: 10 FL (ref 9.4–12.4)
POTASSIUM REFLEX MAGNESIUM: 4 MMOL/L (ref 3.5–5)
POTASSIUM SERPL-SCNC: 4 MMOL/L (ref 3.5–5)
RBC # BLD: 4.73 M/UL (ref 4.7–6.1)
SODIUM BLD-SCNC: 137 MMOL/L (ref 136–145)
WBC # BLD: 15.3 K/UL (ref 4.8–10.8)

## 2022-11-19 PROCEDURE — 2580000003 HC RX 258: Performed by: SURGERY

## 2022-11-19 PROCEDURE — 36415 COLL VENOUS BLD VENIPUNCTURE: CPT

## 2022-11-19 PROCEDURE — 96372 THER/PROPH/DIAG INJ SC/IM: CPT

## 2022-11-19 PROCEDURE — 82947 ASSAY GLUCOSE BLOOD QUANT: CPT

## 2022-11-19 PROCEDURE — 6360000002 HC RX W HCPCS: Performed by: SURGERY

## 2022-11-19 PROCEDURE — 85025 COMPLETE CBC W/AUTO DIFF WBC: CPT

## 2022-11-19 PROCEDURE — 96374 THER/PROPH/DIAG INJ IV PUSH: CPT

## 2022-11-19 PROCEDURE — G0378 HOSPITAL OBSERVATION PER HR: HCPCS

## 2022-11-19 PROCEDURE — 6370000000 HC RX 637 (ALT 250 FOR IP): Performed by: SURGERY

## 2022-11-19 PROCEDURE — 6370000000 HC RX 637 (ALT 250 FOR IP): Performed by: NURSE PRACTITIONER

## 2022-11-19 PROCEDURE — 80048 BASIC METABOLIC PNL TOTAL CA: CPT

## 2022-11-19 RX ORDER — HYDROCODONE BITARTRATE AND ACETAMINOPHEN 5; 325 MG/1; MG/1
1 TABLET ORAL EVERY 8 HOURS PRN
Qty: 20 TABLET | Refills: 0 | Status: SHIPPED | OUTPATIENT
Start: 2022-11-19 | End: 2022-11-26

## 2022-11-19 RX ADMIN — Medication 1 TABLET: at 08:07

## 2022-11-19 RX ADMIN — Medication 10 ML: at 00:49

## 2022-11-19 RX ADMIN — Medication 10 ML: at 11:38

## 2022-11-19 RX ADMIN — METOCLOPRAMIDE 5 MG: 5 TABLET ORAL at 08:07

## 2022-11-19 RX ADMIN — METOCLOPRAMIDE 5 MG: 5 TABLET ORAL at 12:26

## 2022-11-19 RX ADMIN — ASPIRIN 81 MG 81 MG: 81 TABLET ORAL at 08:07

## 2022-11-19 RX ADMIN — HEPARIN SODIUM 5000 UNITS: 5000 INJECTION INTRAVENOUS; SUBCUTANEOUS at 06:22

## 2022-11-19 RX ADMIN — SODIUM CHLORIDE, PRESERVATIVE FREE 10 ML: 5 INJECTION INTRAVENOUS at 11:37

## 2022-11-19 RX ADMIN — INSULIN LISPRO 2 UNITS: 100 INJECTION, SOLUTION INTRAVENOUS; SUBCUTANEOUS at 12:24

## 2022-11-19 RX ADMIN — OXYCODONE 5 MG: 5 TABLET ORAL at 02:43

## 2022-11-19 RX ADMIN — HYDROMORPHONE HYDROCHLORIDE 0.25 MG: 1 INJECTION, SOLUTION INTRAMUSCULAR; INTRAVENOUS; SUBCUTANEOUS at 01:40

## 2022-11-19 ASSESSMENT — PAIN SCALES - WONG BAKER
WONGBAKER_NUMERICALRESPONSE: 0
WONGBAKER_NUMERICALRESPONSE: 0

## 2022-11-19 ASSESSMENT — PAIN SCALES - GENERAL
PAINLEVEL_OUTOF10: 6
PAINLEVEL_OUTOF10: 0
PAINLEVEL_OUTOF10: 8
PAINLEVEL_OUTOF10: 0

## 2022-11-19 ASSESSMENT — PAIN DESCRIPTION - LOCATION
LOCATION: NECK
LOCATION: LEG

## 2022-11-19 ASSESSMENT — PAIN DESCRIPTION - ORIENTATION
ORIENTATION: RIGHT
ORIENTATION: RIGHT

## 2022-11-19 ASSESSMENT — PAIN DESCRIPTION - DESCRIPTORS
DESCRIPTORS: ACHING;DISCOMFORT
DESCRIPTORS: BURNING;ITCHING

## 2022-11-19 NOTE — DISCHARGE SUMMARY
Miya Corrigan  :  1955  MRN:  [id number]    Admit date:  2022 Discharge date:  2022    Discharging Physician:  Dr. Sobia Zapien Directive: Full Code    Consults: Hong Adams TO [facility]     Primary Care Physician:  Milagros Ivory MD    Discharge Diagnoses:  Principal Problem:    Dialysis AV fistula malfunction, initial encounter [facility])  Active Problems:    ESRD on hemodialysis ([facility] [address]    Diabetes ([facility] [address]    Benign essential HTN    Hyperlipidemia  Resolved Problems:    * No resolved hospital problems. *      Portions of this note have been copied forward, however, changed to reflect the most current clinical status of this patient. Hospital Course: The patient is a 79 y.o. male with past medical history of ESRD on HD MWF, T2DM, HTN, and hyperlipidemia who presented to [facility] ED complaining of AV Fistula malfunction. Patient was sent from dialysis due to malfunctioning AV fistula. Last dialysis on Wednesday. Denied any other symptoms at this time. No arm swelling. Denied SOB or chest pain. Workup in ED revealed stable vital signs, BUN 82, Cr 8.2, WBC 11.0, Hgb 13.4. ED physician discussed case with Dr. Missael Moses. Patient was admitted to hospital medicine for AV fistula malfunction with vascular surgery consultation. Nephrology was consulted for ESRD management. Vascular  for perm cath placement, graft to the upper left arm. Patient stable for discharge at this time. Significant Diagnostic Studies:   No results found.     Pertinent Labs:   CBC:   Recent Labs     22  0809 22  0324   WBC 11.0* 15.3*   HGB 13.4* 13.5*    153     BMP:    Recent Labs     22  0809 22  0324    137   K 4.9 4.0  4.0   CL 99 92*   CO2 28 28   BUN 82* 38*   CREATININE 8.2* 5.7*   GLUCOSE 140* 212*     INR:   Recent Labs     22  0806   INR 1.02 Physical Exam:  Vital Signs: BP (!) 105/52   Pulse 90   Temp 98.6 °F (37 °C)   Resp 17   Ht 5' 11\" (1.803 m)   Wt 185 lb (83.9 kg)   SpO2 97%   BMI 25.80 kg/m²       Physical Exam  Vitals and nursing note reviewed. Constitutional:       Appearance: Normal appearance. HENT:      Head: Normocephalic. Nose: Nose normal.      Mouth/Throat:      Mouth: Mucous membranes are dry. Eyes:      Pupils: Pupils are equal, round, and reactive to light. Cardiovascular:      Rate and Rhythm: Normal rate and regular rhythm. Pulses: Normal pulses. Heart sounds: Normal heart sounds. Abdominal:      Palpations: Abdomen is soft. Tenderness: There is no abdominal tenderness. Musculoskeletal:         General: Normal range of motion. Cervical back: Normal range of motion. Skin:     General: Skin is warm and dry. Capillary Refill: Capillary refill takes less than 2 seconds. Comments: Left upper are dressing dry/intact   Neurological:      Mental Status: He is alert and oriented to person, place, and time. Psychiatric:         Mood and Affect: Mood normal.         Behavior: Behavior normal.         Thought Content: Thought content normal.       Discharge Medications:         Medication List        START taking these medications      HYDROcodone-acetaminophen 5-325 MG per tablet  Commonly known as: Norco  Take 1 tablet by mouth every 8 hours as needed for Pain for up to 7 days. Intended supply: 5 days.  Take lowest dose possible to manage pain            CONTINUE taking these medications      aspirin 81 MG tablet     atorvastatin 80 MG tablet  Commonly known as: LIPITOR     Auryxia 1  MG(Fe) Tabs tablet  Generic drug: ferric citrate     b complex vitamins capsule     diphenhydrAMINE 50 MG capsule  Commonly known as: BENADRYL     insulin aspart protamine-insulin aspart (70-30) 100 UNIT/ML injection  Commonly known as: NovoLOG 70/30     melatonin 3 MG Tabs tablet metoclopramide 5 MG tablet  Commonly known as: REGLAN     metoprolol succinate 50 MG extended release tablet  Commonly known as: TOPROL XL  Take 1 tablet by mouth daily     midodrine 5 MG tablet  Commonly known as: PROAMATINE     sevelamer 800 MG tablet  Commonly known as: RENVELA     VITAMIN D2 PO            STOP taking these medications      acetaminophen 500 MG tablet  Commonly known as: TYLENOL     fenofibrate 160 MG tablet  Commonly known as: TRIGLIDE     vitamin C 500 MG tablet  Commonly known as: ASCORBIC ACID               Where to Get Your Medications        These medications were sent to [address] [place] [address], [address] [facility] [phone] [facility] [phone]  [address] [place] [address] [address]      Phone: [phone]   HYDROcodone-acetaminophen 5-325 MG per tablet         Discharge Instructions: Follow up with Adam Cisse MD in 7 days. Take medications as directed. Resume activity as tolerated. Diet: ADULT DIET; Regular; Low Sodium (2 gm); Low Potassium (Less than 3000 mg/day); Low Phosphorus (Less than 1000 mg)     Disposition: Patient is  Stable  and will be discharged to Home. Time spent on discharge  35  minutes spent in assessing patient, reviewing medications, discussion with nursing, confirming safe discharge plan and preparation of discharge summary.     Signed:  CALIXTO Ovalle - CNP, 11/19/2022 11:33 AM

## 2022-11-19 NOTE — PROGRESS NOTES
4 Eyes Skin Assessment    Monique Young is being assessed upon: Admission    I agree that I, Deidra Antunez RN, along with Anna Toro LPN(either 2 RN's or 1 LPN and 1 RN) have performed a thorough Head to Toe Skin Assessment on the patient. ALL assessment sites listed below have been assessed. Areas assessed by both nurses:     [x]   Head, Face, and Ears   [x]   Shoulders, Back, and Chest  [x]   Arms, Elbows, and Hands   [x]   Coccyx, Sacrum, and Ischium  [x]   Legs, Feet, and Heels    Does the Patient have Skin Breakdown?  No    Ruben Prevention initiated: Yes  Wound Care Orders initiated: No    WOC nurse consulted for Pressure Injury (Stage 3,4, Unstageable, DTI, NWPT, and Complex wounds) and New or Established Ostomies: No        Primary Nurse eSignature: Deidra Antunez RN on 11/18/2022 at 8:31 PM      Co-Signer eSignature: Electronically signed by Deidra Antunez RN on 11/18/22 at 8:31 PM CST

## 2022-11-19 NOTE — PLAN OF CARE
Problem: Discharge Planning  Goal: Discharge to home or other facility with appropriate resources  Outcome: Progressing  Flowsheets (Taken 11/19/2022 0052 by Clifford Cid LPN)  Discharge to home or other facility with appropriate resources: Identify barriers to discharge with patient and caregiver     Problem: Pain  Goal: Verbalizes/displays adequate comfort level or baseline comfort level  Outcome: Progressing     Problem: Skin/Tissue Integrity  Goal: Absence of new skin breakdown  Description: 1. Monitor for areas of redness and/or skin breakdown  2. Assess vascular access sites hourly  3. Every 4-6 hours minimum:  Change oxygen saturation probe site  4. Every 4-6 hours:  If on nasal continuous positive airway pressure, respiratory therapy assess nares and determine need for appliance change or resting period.   Outcome: Progressing

## 2022-11-19 NOTE — PROGRESS NOTES
Nephrology ([facility]) Consult Note      Patient:  Vivian Esparza  YOB: 1955  Date of Service: 11/19/2022  MRN: [id number]   Acct: [de-identified]   Primary Care Physician: Yovany Diaz MD  Advance Directive: Full Code  Admit Date: 11/18/2022       Hospital Day: 1  Referring Provider: Ifrah Coronado MD    Patient independently seen and examined, Chart, Consults, Notes, Operative notes, Labs, Cardiology, and Radiology studies reviewed as available. Subjective:  Vivian Esparza is a 79 y.o. male for whom we were consulted for evaluation and treatment of end-stage renal disease. Monday Wednesday Friday dialysis patient to present to the hospital for evaluation of fistula difficulties. He was taken to the OR the morning of 11/18 by Dr. Gayle Soliz with ultimate PermCath placement and upper extremity graft placement. He was initially seen on dialysis and tolerated without complaint. He denied current chest pain, shortness of air at rest, nausea or vomiting. Today, no overnight events. No family present. Patient hoping for discharge today. Denies chest pain, shortness of air at rest, nausea or vomiting. Allergies:  Patient has no known allergies.     Medicines:  Current Facility-Administered Medications   Medication Dose Route Frequency Provider Last Rate Last Admin    sodium chloride flush 0.9 % injection 5-40 mL  5-40 mL IntraVENous 2 times per day Adrian Cummings MD   10 mL at 11/18/22 2155    sodium chloride flush 0.9 % injection 5-40 mL  5-40 mL IntraVENous PRN Adrian Cummings MD        polyethylene glycol (GLYCOLAX) packet 17 g  17 g Oral Daily PRN Adrian Cummings MD        acetaminophen (TYLENOL) tablet 650 mg  650 mg Oral Q6H PRN Adrian Cummings MD        Or    acetaminophen (TYLENOL) suppository 650 mg  650 mg Rectal Q6H PRN Adrian Cummings MD        heparin (porcine) injection 5,000 Units  5,000 Units SubCUTAneous 3 times per day Shahnaz Castellano Luis Alberto Spencer MD   5,000 Units at 11/19/22 7671    aspirin chewable tablet 81 mg  81 mg Oral Daily Oxana Mclean MD   81 mg at 11/19/22 4359    atorvastatin (LIPITOR) tablet 80 mg  80 mg Oral Nightly Oxana Mclean MD   80 mg at 11/18/22 7424    vitamin B and C (TOTAL B-C) 1 tablet  1 tablet Oral Daily Oxana Mclean MD   1 tablet at 11/19/22 9862    diphenhydrAMINE (BENADRYL) tablet 50 mg  50 mg Oral Q6H PRN Oxana Mclean MD        melatonin disintegrating tablet 10 mg  10 mg Oral Nightly PRN Oxana Mclean MD        metoclopramide (REGLAN) tablet 5 mg  5 mg Oral 4x Daily Oxana Mclean MD   5 mg at 11/19/22 7148    metoprolol succinate (TOPROL XL) extended release tablet 50 mg  50 mg Oral Daily Oxana Mclean MD        sevelamer (RENVELA) tablet 800 mg  800 mg Oral Dinner Oxana Mclean MD   800 mg at 11/18/22 2005    sodium chloride flush 0.9 % injection 5-40 mL  5-40 mL IntraVENous 2 times per day Oxana Mclean MD   10 mL at 11/19/22 0049    sodium chloride flush 0.9 % injection 5-40 mL  5-40 mL IntraVENous PRN Oxana Mclean MD        0.9 % sodium chloride infusion   IntraVENous PRN Oxana Mclean MD        metoprolol tartrate (LOPRESSOR) tablet 25 mg  25 mg Oral Q12H PRN Oxana Mclean MD        cloNIDine (CATAPRES) tablet 0.1 mg  0.1 mg Oral Q2H PRN Oxana Mclean MD        oxyCODONE (ROXICODONE) immediate release tablet 5 mg  5 mg Oral Q6H PRN Oxana Mclean MD   5 mg at 11/19/22 0243    HYDROmorphone HCl PF (DILAUDID) injection 0.25 mg  0.25 mg IntraVENous Q3H PRN Oxana Mclean MD   0.25 mg at 11/19/22 0140    Or    HYDROmorphone HCl PF (DILAUDID) injection 0.5 mg  0.5 mg IntraVENous Q3H PRN Oxana Mclean MD   0.5 mg at 11/18/22 2015    ondansetron (ZOFRAN-ODT) disintegrating tablet 4 mg  4 mg Oral Q8H PRN Oxana Mclean MD        Or    ondansetron [facility]) injection 4 mg  4 mg IntraVENous Q6H PRN Oxana Mclean MD        hydrALAZINE (APRESOLINE) injection 10 mg  10 mg IntraVENous Q1H PRN Abhishek Wylie MD        glucose chewable tablet 16 g  4 tablet Oral PRN Gerldine Settles, APRN - CNP        dextrose bolus 10% 125 mL  125 mL IntraVENous PRN Gerldine Settles, APRN - CNP        Or    dextrose bolus 10% 250 mL  250 mL IntraVENous PRN Gerldine Settles, APRN - CNP        glucagon (rDNA) injection 1 mg  1 mg IntraMUSCular PRN Gerldine Settles, APRN - CNP        dextrose 10 % infusion   IntraVENous Continuous PRN Gerldine Settles, APRN - CNP        insulin lispro (HUMALOG) injection vial 0-4 Units  0-4 Units SubCUTAneous TID WC Gerldine Settles, APRN - CNP        insulin lispro (HUMALOG) injection vial 0-4 Units  0-4 Units SubCUTAneous Nightly Gerldine Settles, APRN - CNP   4 Units at 11/18/22 2202       Past Medical History:  Past Medical History:   Diagnosis Date    Arthritis     Chronic kidney disease     CKD (chronic kidney disease) stage V requiring chronic dialysis ([facility] 12/19/2019    COVID-19 08/2021    Diabetes mellitus ([facility]     Diabetic retinopathy ([facility]     Digestive disorder     \" slow digestion \"    Hemodialysis patient ([facility] [address]     dialysis on mon, wed, and friday at [facility]    Hyperlipemia     Hypertension     Legally blind     Neuropathy     feet       Past Surgical History:  Past Surgical History:   Procedure Laterality Date    CATARACT REMOVAL      DIALYSIS FISTULA CREATION Left 2/4/2020    LIGATION OF LEFT RADIAL ARTERY TO LEFT CEPHALIC VEIN HEMODIALYSIS FISTULA; CREATION OF LEFT BRACHIAL ARTERY TO LEFT CEPHALIC VEIN PRIMARY ARTERIO-VENOUS FISTULA performed by Abhishek Wylie MD at [facility] Right 2/4/2020    INSERTION OF RIGHT INTERNAL JUGULAR HEMODIALYSIS CATHETER performed by Abhishek Wylie MD at [facility] 7/2/2018    ARM RADICAL/ CEPHALIC AV FISTULA performed by Melissa Boyer MD at [address] [place] [address]  12/13/2018    [facility]. Left upper fistulograms, BA distal forearm cephalic vein 4H00 cutter,coil branch (5x3(2)    VASCULAR SURGERY  03/28/2019    [facility]. Left upper fistulograms/venograms. VASCULAR SURGERY  02/04/2020    [facility] Left brachial artery to Cephalic Vein Arteriovenous Fistula Creation, Ligation of distal forearm cephallic vein near the radial cephallic arteriovenous anastomosis, Ultrasound-guided cannulation of right internal jugular vein a placement of right internal jugular vein tunneled dialysis catheter (glidepath 23 cm tip to cuff)    VASCULAR SURGERY  02/04/2020    [facility]  Left brachial artery Cephalic Vein Arteriovenous Fistula Creation, Ligation of distal forearm cephalic vein near the radial cephalic arteriovenous anastomosis, Ultrasound-guided cannulation of right internal juhular vein and placement of right internal jugular vein tunneled dialysis catheter (glidepath 23 cm tip to cuff)     VASCULAR SURGERY  04/02/2020    [facility] removal of tunneled dialysis catheter right internal jugular vein       Family History  Family History   Problem Relation Age of Onset    Cancer Mother     Other Mother         COPD    Other Father         tractor accident       Social History  Social History     Socioeconomic History    Marital status:      Spouse name: Not on file    Number of children: Not on file    Years of education: Not on file    Highest education level: Not on file   Occupational History    Not on file   Tobacco Use    Smoking status: Never    Smokeless tobacco: Never   Vaping Use    Vaping Use: Never used   Substance and Sexual Activity    Alcohol use: No    Drug use: No    Sexual activity: Not on file   Other Topics Concern    Not on file   Social History Narrative    Not on file     Social Determinants of Health     Financial Resource Strain: Low Risk     Difficulty of Paying Living Expenses: Not hard at all   Food Insecurity: Not on file   Transportation Needs: Unmet Transportation Needs    Lack of Transportation (Medical): Yes    Lack of Transportation (Non-Medical):  No Physical Activity: Insufficiently Active    Days of Exercise per Week: 5 days    Minutes of Exercise per Session: 10 min   Stress: Not on file   Social Connections: Not on file   Intimate Partner Violence: Not At Risk    Fear of Current or Ex-Partner: No    Emotionally Abused: No    Physically Abused: No    Sexually Abused: No   Housing Stability: Low Risk     Unable to Pay for Housing in the Last Year: No    Number of Places Lived in the Last Year: 1    Unstable Housing in the Last Year: No         Review of Systems:  History obtained from chart review and the patient  General ROS: No fever or chills  Respiratory ROS: No cough, shortness of breath, wheezing  Cardiovascular ROS: No chest pain or palpitations  Gastrointestinal ROS: No abdominal pain or melena  Genito-Urinary ROS: No dysuria or hematuria  Musculoskeletal ROS: No joint pain or swelling   14 point ROS reviewed with the patient and negative except as noted above and in the HPI unless unable to obtain.     Objective:  Patient Vitals for the past 24 hrs:   BP Temp Temp src Pulse Resp SpO2   11/19/22 0814 -- -- -- 90 -- --   11/19/22 0701 118/67 98.6 °F (37 °C) -- 93 17 97 %   11/19/22 0210 -- -- -- -- 16 --   11/19/22 0043 128/80 98.1 °F (36.7 °C) Temporal 78 17 98 %   11/18/22 2045 -- -- -- -- 16 --   11/18/22 1933 114/69 98 °F (36.7 °C) Oral 64 16 100 %   11/18/22 1409 (!) 126/58 -- -- 62 16 100 %   11/18/22 1400 (!) 115/51 -- -- 62 14 92 %   11/18/22 1355 113/60 97.5 °F (36.4 °C) -- 62 15 95 %   11/18/22 1350 113/60 -- -- 62 12 94 %   11/18/22 1345 -- -- -- 62 17 94 %   11/18/22 1340 (!) 98/40 -- -- 62 15 93 %   11/18/22 1335 (!) 103/49 -- -- 61 11 90 %   11/18/22 1334 -- 97.4 °F (36.3 °C) -- -- -- --   11/18/22 1330 (!) 98/46 97.4 °F (36.3 °C) Temporal 64 12 92 %   11/18/22 1050 -- -- -- 62 19 98 %   11/18/22 1035 (!) 169/75 -- -- 63 13 97 %       Intake/Output Summary (Last 24 hours) at 11/19/2022 0938  Last data filed at 11/18/2022 1101  Gross per 24 hour   Intake 250 ml   Output --   Net 250 ml     General: awake/alert   Chest:  clear to auscultation bilaterally  CVS: regular rate and rhythm  Abdominal: soft, nontender, normal bowel sounds  Extremities: no cyanosis or edema  Skin: warm and dry without rash      Labs:  BMP:   Recent Labs     11/18/22  0809 11/19/22  0324    137   K 4.9 4.0  4.0   CL 99 92*   CO2 28 28   BUN 82* 38*   CREATININE 8.2* 5.7*   CALCIUM 9.1 9.4     CBC:   Recent Labs     11/18/22  0809 11/19/22  0324   WBC 11.0* 15.3*   HGB 13.4* 13.5*   HCT 44.8 44.4   MCV 93.9 93.9    153     LIVER PROFILE: No results for input(s): AST, ALT, LIPASE, BILIDIR, BILITOT, ALKPHOS in the last 72 hours. Invalid input(s): AMYLASE,  ALB  PT/INR:   Recent Labs     11/18/22  0806   PROTIME 13.3   INR 1.02     APTT: No results for input(s): APTT in the last 72 hours. BNP:  No results for input(s): BNP in the last 72 hours. Ionized Calcium:No results for input(s): IONCA in the last 72 hours. Magnesium:No results for input(s): MG in the last 72 hours. Phosphorus:No results for input(s): PHOS in the last 72 hours. HgbA1C:   Recent Labs     11/18/22  0809   LABA1C 6.5*     Hepatic: No results for input(s): ALKPHOS, ALT, AST, PROT, BILITOT, BILIDIR, LABALBU in the last 72 hours. Lactic Acid: No results for input(s): LACTA in the last 72 hours. Troponin: No results for input(s): CKTOTAL, CKMB, TROPONINT in the last 72 hours. ABGs: No results for input(s): PH, PCO2, PO2, HCO3, O2SAT in the last 72 hours. CRP:  No results for input(s): CRP in the last 72 hours. Sed Rate:  No results for input(s): SEDRATE in the last 72 hours. Cultures:   No results for input(s): CULTURE in the last 72 hours. No results for input(s): Hima BACON in the last 72 hours. No results for input(s): CXSURG in the last 72 hours. Radiology reports as per the Radiologist  Radiology: No results found.      Assessment   End-stage renal disease  Hypertension  Diabetes type 2 with diabetic nephropathy  Secondary hyperparathyroidism  Anemia and chronic kidney disease  Vitamin D deficiency  Paroxysmal atrial fibrillation    Plan:  Discussed with patient, nursing  Work-up reviewed to date  Monitor labs  Dialysis Monday Wednesday Friday per routine scheduling, completed 11/18 without incident      Thank you for the consult, we appreciate the opportunity to provide care to your patients. Feel free to contact me if I can be of any further assistance.       Carlos Neal MD  11/19/22  9:38 AM

## 2022-11-19 NOTE — PROGRESS NOTES
Vascular Surgery -  Nimisha Zambrano MD M.D. Daily Progress Note    Pt Name: [id number] Record Number: [id number]  Date of Birth 1955   Today's Date: 11/19/2022      SUBJECTIVE:     Patient was seen and examined. Pain is  controlled around permcath site and arm  Dialysis catheter working/worked well  No complaints     OBJECTIVE:     Patient Vitals for the past 24 hrs:   BP Temp Temp src Pulse Resp SpO2   11/19/22 1001 (!) 105/52 -- -- 90 -- --   11/19/22 0814 -- -- -- 90 -- --   11/19/22 0701 118/67 98.6 °F (37 °C) -- 93 17 97 %   11/19/22 0210 -- -- -- -- 16 --   11/19/22 0043 128/80 98.1 °F (36.7 °C) Temporal 78 17 98 %   11/18/22 2045 -- -- -- -- 16 --   11/18/22 1933 114/69 98 °F (36.7 °C) Oral 64 16 100 %   11/18/22 1409 (!) 126/58 -- -- 62 16 100 %   11/18/22 1400 (!) 115/51 -- -- 62 14 92 %   11/18/22 1355 113/60 97.5 °F (36.4 °C) -- 62 15 95 %   11/18/22 1350 113/60 -- -- 62 12 94 %   11/18/22 1345 -- -- -- 62 17 94 %   11/18/22 1340 (!) 98/40 -- -- 62 15 93 %   11/18/22 1335 (!) 103/49 -- -- 61 11 90 %   11/18/22 1334 -- 97.4 °F (36.3 °C) -- -- -- --   11/18/22 1330 (!) 98/46 97.4 °F (36.3 °C) Temporal 64 12 92 %   11/18/22 1050 -- -- -- 62 19 98 %   11/18/22 1035 (!) 169/75 -- -- 63 13 97 %         Intake/Output Summary (Last 24 hours) at 11/19/2022 1024  Last data filed at 11/19/2022 0949  Gross per 24 hour   Intake 460 ml   Output --   Net 460 ml       In: 460 [P.O.:210]  Out: -     I/O last 3 completed shifts: In: 250 [IV Piggyback:250]  Out: -      Date 11/19/22 0000 - 11/19/22 2359   Shift 1920-9003 8795-3730 9335-3324 24 Hour Total   INTAKE   P.O.(mL/kg/hr)  210  210   Shift Total(mL/kg)  210(2.5)  210(2.5)   OUTPUT   Shift Total(mL/kg)       Weight (kg) 83.9 83.9 83.9 83.9       Wt Readings from Last 3 Encounters:   11/18/22 185 lb (83.9 kg)   10/01/22 176 lb 3.2 oz (79.9 kg)   09/20/22 174 lb 12.8 oz (79.3 kg)        Body mass index is 25.8 kg/m². Diet: ADULT DIET;  Regular; Low Sodium (2 gm); Low Potassium (Less than 3000 mg/day); Low Phosphorus (Less than 1000 mg)        MEDS:     Scheduled Meds:   sodium chloride flush  5-40 mL IntraVENous 2 times per day    heparin (porcine)  5,000 Units SubCUTAneous 3 times per day    aspirin  81 mg Oral Daily    atorvastatin  80 mg Oral Nightly    vitamin B and C  1 tablet Oral Daily    metoclopramide  5 mg Oral 4x Daily    metoprolol succinate  50 mg Oral Daily    sevelamer  800 mg Oral Dinner    sodium chloride flush  5-40 mL IntraVENous 2 times per day    insulin lispro  0-4 Units SubCUTAneous TID WC    insulin lispro  0-4 Units SubCUTAneous Nightly     Continuous Infusions:   sodium chloride      dextrose       PRN Meds:sodium chloride flush, 5-40 mL, PRN  polyethylene glycol, 17 g, Daily PRN  acetaminophen, 650 mg, Q6H PRN   Or  acetaminophen, 650 mg, Q6H PRN  diphenhydrAMINE, 50 mg, Q6H PRN  melatonin, 10 mg, Nightly PRN  sodium chloride flush, 5-40 mL, PRN  sodium chloride, , PRN  metoprolol tartrate, 25 mg, Q12H PRN  cloNIDine, 0.1 mg, Q2H PRN  oxyCODONE, 5 mg, Q6H PRN  HYDROmorphone, 0.25 mg, Q3H PRN   Or  HYDROmorphone, 0.5 mg, Q3H PRN  ondansetron, 4 mg, Q8H PRN   Or  ondansetron, 4 mg, Q6H PRN  hydrALAZINE, 10 mg, Q1H PRN  glucose, 4 tablet, PRN  dextrose bolus, 125 mL, PRN   Or  dextrose bolus, 250 mL, PRN  glucagon (rDNA), 1 mg, PRN  dextrose, , Continuous PRN          PHYSICAL EXAM:     CONSTITUTIONAL: Alert and oriented times 3, no acute distress and cooperative to examination. NECK: No hematoma around tunneled dialysis catheter, No significant drainage,   LUNGS: Chest expands equally bilaterally upon respiration, no accessory muscle used. Ausculation reveals no wheezes, rales or rhonchi.   WOUND/INCISION:  Chest exit site healing well, no drainage, no erythema, left axilla wound dressing has some clotted blood, no hematoma, thrill in left upper arm access, hand well perfused, good , no edema in arm       LABS:       CBC: Recent Labs     11/18/22  0809 11/19/22  0324   WBC 11.0* 15.3*   RBC 4.77 4.73   HGB 13.4* 13.5*   HCT 44.8 44.4   MCV 93.9 93.9   MCH 28.1 28.5   MCHC 29.9* 30.4*   RDW 16.9* 16.9*    153   MPV 9.6 10.0      Last 3 CMP:   Recent Labs     11/18/22  0809 11/19/22  0324    137   K 4.9 4.0  4.0   CL 99 92*   CO2 28 28   BUN 82* 38*   CREATININE 8.2* 5.7*   GLUCOSE 140* 212*   CALCIUM 9.1 9.4      Troponin: No results for input(s): TROPONINI in the last 72 hours. Calcium:   Lab Results   Component Value Date/Time    CALCIUM 9.4 11/19/2022 03:24 AM    CALCIUM 9.1 11/18/2022 08:09 AM    CALCIUM 9.1 10/01/2022 01:37 AM      Ionized Calcium: No results found for: IONCA   Lipids: No results for input(s): CHOL, HDL in the last 72 hours. Invalid input(s): LDLCALCU  INR:   Recent Labs     11/18/22  0806   INR 1.02     Lactic Acid:   Lab Results   Component Value Date/Time    LACTA 1.7 09/30/2022 04:22 PM                    DVT prophylaxis: [] Lovenox                                 [] SCDs                                 [] SQ Heparin                                 [] Encourage ambulation, low risk for DVT, no chemical or                                      mechanical prophylaxis necessary              [] Already on Anticoagulation      RADIOLOGY:                ASSESSMENT:     POD # 1 s/p right internal jugular vein tunneled dialysis catheter placement and revision of left upper extremity av fistula with interposition artegraft.   Patient Active Problem List   Diagnosis    Dialysis AV fistula malfunction, subsequent encounter    ESRD on hemodialysis ([facility] [facility]    Diabetes ([facility] [address] [facility]    Benign essential HTN    Hyperlipidemia    Near syncope    Arrhythmia    Dialysis AV fistula malfunction, initial encounter ([facility] [address]               PLAN:     OK to discharge from my standpoint after lunch if axillary wound remains dry  F/U with me in office 2 weeks to remove sutures and check on left arm access          JOSE PEREZ, SUZANNE BAUER.    Electronically signed 11/19/2022 at 10:24 AM

## 2022-11-19 NOTE — PROGRESS NOTES
11/18/22 2020   Encounter Summary   Encounter Overview/Reason  Advance Care Planning   Service Provided For: Patient   Referral/Consult From: Nurse   Support System Children   Complexity of Encounter Moderate   Begin Time 1900   End Time  1930   Total Time Calculated 30 min   Encounter    Type Initial Screen/Assessment   Spiritual/Emotional needs   Type Spiritual Support   Assessment/Intervention/Outcome   Assessment Coping; Hopeful   Intervention Active listening;Discussed belief system/[employer] practices/amalia;Prayer (assurance of)/[place]   Outcome Expressed feelings, needs, and concerns;Expressed Gratitude     Had a ACP discussion with Mr. Solorio. He named her daughter Sofia Whitmore to be MD. Stated he wants to be DNR and DNI. Pt's nurse present during conversation. LW paper work provided (pt said he is medically blind). Hope family will help with LW. ACP Note completed at this time.    Electronically signed by Alexandra Aquino on 11/18/2022 at 8:26 PM

## 2022-11-19 NOTE — ACP (ADVANCE CARE PLANNING)
Advance Care Planning     Advance Care Planning Activator (Inpatient)  Conversation Note      Date of ACP Conversation: 11/18/2022     Conversation Conducted with: Patient with Decision Making Capacity    ACP Activator: [address] Decision Maker:     Current Designated Health Care Decision Maker:     Primary Decision Maker: Iwona Churchill - Jimbo - [phone]       Care Preferences    Ventilation: \"If you were in your present state of health and suddenly became very ill and were unable to breathe on your own, what would your preference be about the use of a ventilator (breathing machine) if it were available to you? \"      Would the patient desire the use of ventilator (breathing machine)?:   no    \"If your health worsens       Resuscitation  \"CPR works best to restart the heart when there is a sudden event, like a heart attack, in someone who is otherwise healthy. Unfortunately, CPR does not typically restart the heart for people who have serious health conditions or who are very sick. \"    \"In the event your heart stopped as a result of an underlying serious health condition, would you want attempts to be made to restart your heart (answer \"yes\" for attempt to resuscitate) or would you prefer a natural death (answer \"no\" for do not attempt to resuscitate)? \"   no          Electronically signed by Eula Nair on 11/18/2022 at 8:28 PM

## 2022-12-26 ENCOUNTER — HOSPITAL ENCOUNTER (OUTPATIENT)
Facility: HOSPITAL | Age: 67
Setting detail: OBSERVATION
Discharge: HOME HEALTH | End: 2022-12-29
Attending: INTERNAL MEDICINE | Admitting: INTERNAL MEDICINE
Payer: MEDICARE

## 2022-12-26 ENCOUNTER — APPOINTMENT (OUTPATIENT)
Dept: GENERAL RADIOLOGY | Facility: HOSPITAL | Age: 67
End: 2022-12-26
Payer: MEDICARE

## 2022-12-26 DIAGNOSIS — Z78.9 IMPAIRED MOBILITY AND ADLS: ICD-10-CM

## 2022-12-26 DIAGNOSIS — Z74.09 IMPAIRED MOBILITY AND ADLS: ICD-10-CM

## 2022-12-26 DIAGNOSIS — Z74.09 IMPAIRED FUNCTIONAL MOBILITY, BALANCE, GAIT, AND ENDURANCE: ICD-10-CM

## 2022-12-26 PROBLEM — R41.0 CONFUSION AND DISORIENTATION: Status: ACTIVE | Noted: 2022-12-26

## 2022-12-26 LAB
ALBUMIN SERPL-MCNC: 3.9 G/DL (ref 3.5–5.2)
ALBUMIN/GLOB SERPL: 1 G/DL
ALP SERPL-CCNC: 138 U/L (ref 39–117)
ALT SERPL W P-5'-P-CCNC: 20 U/L (ref 1–41)
ANION GAP SERPL CALCULATED.3IONS-SCNC: 15 MMOL/L (ref 5–15)
AST SERPL-CCNC: 19 U/L (ref 1–40)
BASOPHILS # BLD AUTO: 0.05 10*3/MM3 (ref 0–0.2)
BASOPHILS NFR BLD AUTO: 0.4 % (ref 0–1.5)
BILIRUB SERPL-MCNC: 0.4 MG/DL (ref 0–1.2)
BUN SERPL-MCNC: 44 MG/DL (ref 8–23)
BUN/CREAT SERPL: 7.7 (ref 7–25)
CALCIUM SPEC-SCNC: 9.5 MG/DL (ref 8.6–10.5)
CHLORIDE SERPL-SCNC: 94 MMOL/L (ref 98–107)
CO2 SERPL-SCNC: 29 MMOL/L (ref 22–29)
CREAT SERPL-MCNC: 5.72 MG/DL (ref 0.76–1.27)
D-LACTATE SERPL-SCNC: 1.3 MMOL/L (ref 0.5–2)
DEPRECATED RDW RBC AUTO: 51.4 FL (ref 37–54)
EGFRCR SERPLBLD CKD-EPI 2021: 10.2 ML/MIN/1.73
EOSINOPHIL # BLD AUTO: 0.07 10*3/MM3 (ref 0–0.4)
EOSINOPHIL NFR BLD AUTO: 0.6 % (ref 0.3–6.2)
ERYTHROCYTE [DISTWIDTH] IN BLOOD BY AUTOMATED COUNT: 15.9 % (ref 12.3–15.4)
GLOBULIN UR ELPH-MCNC: 3.9 GM/DL
GLUCOSE SERPL-MCNC: 230 MG/DL (ref 65–99)
HCT VFR BLD AUTO: 34.2 % (ref 37.5–51)
HGB BLD-MCNC: 10.8 G/DL (ref 13–17.7)
IMM GRANULOCYTES # BLD AUTO: 0.05 10*3/MM3 (ref 0–0.05)
IMM GRANULOCYTES NFR BLD AUTO: 0.4 % (ref 0–0.5)
LYMPHOCYTES # BLD AUTO: 2 10*3/MM3 (ref 0.7–3.1)
LYMPHOCYTES NFR BLD AUTO: 17.1 % (ref 19.6–45.3)
MCH RBC QN AUTO: 27.5 PG (ref 26.6–33)
MCHC RBC AUTO-ENTMCNC: 31.6 G/DL (ref 31.5–35.7)
MCV RBC AUTO: 87 FL (ref 79–97)
MONOCYTES # BLD AUTO: 1.17 10*3/MM3 (ref 0.1–0.9)
MONOCYTES NFR BLD AUTO: 10 % (ref 5–12)
NEUTROPHILS NFR BLD AUTO: 71.5 % (ref 42.7–76)
NEUTROPHILS NFR BLD AUTO: 8.35 10*3/MM3 (ref 1.7–7)
NRBC BLD AUTO-RTO: 0 /100 WBC (ref 0–0.2)
PLATELET # BLD AUTO: 143 10*3/MM3 (ref 140–450)
PMV BLD AUTO: 9.6 FL (ref 6–12)
POTASSIUM SERPL-SCNC: 4.6 MMOL/L (ref 3.5–5.2)
PROCALCITONIN SERPL-MCNC: 0.73 NG/ML (ref 0–0.25)
PROT SERPL-MCNC: 7.8 G/DL (ref 6–8.5)
RBC # BLD AUTO: 3.93 10*6/MM3 (ref 4.14–5.8)
SODIUM SERPL-SCNC: 138 MMOL/L (ref 136–145)
WBC NRBC COR # BLD: 11.69 10*3/MM3 (ref 3.4–10.8)

## 2022-12-26 PROCEDURE — 96372 THER/PROPH/DIAG INJ SC/IM: CPT

## 2022-12-26 PROCEDURE — 80053 COMPREHEN METABOLIC PANEL: CPT | Performed by: INTERNAL MEDICINE

## 2022-12-26 PROCEDURE — G0379 DIRECT REFER HOSPITAL OBSERV: HCPCS

## 2022-12-26 PROCEDURE — 25010000002 HEPARIN (PORCINE) PER 1000 UNITS: Performed by: INTERNAL MEDICINE

## 2022-12-26 PROCEDURE — G0378 HOSPITAL OBSERVATION PER HR: HCPCS

## 2022-12-26 PROCEDURE — 82962 GLUCOSE BLOOD TEST: CPT

## 2022-12-26 PROCEDURE — 87040 BLOOD CULTURE FOR BACTERIA: CPT | Performed by: INTERNAL MEDICINE

## 2022-12-26 PROCEDURE — 85025 COMPLETE CBC W/AUTO DIFF WBC: CPT | Performed by: INTERNAL MEDICINE

## 2022-12-26 PROCEDURE — 71045 X-RAY EXAM CHEST 1 VIEW: CPT

## 2022-12-26 PROCEDURE — 84145 PROCALCITONIN (PCT): CPT | Performed by: INTERNAL MEDICINE

## 2022-12-26 PROCEDURE — 83605 ASSAY OF LACTIC ACID: CPT | Performed by: INTERNAL MEDICINE

## 2022-12-26 RX ORDER — SODIUM CHLORIDE 0.9 % (FLUSH) 0.9 %
10 SYRINGE (ML) INJECTION EVERY 12 HOURS SCHEDULED
Status: DISCONTINUED | OUTPATIENT
Start: 2022-12-26 | End: 2022-12-29 | Stop reason: HOSPADM

## 2022-12-26 RX ORDER — HEPARIN SODIUM 5000 [USP'U]/ML
5000 INJECTION, SOLUTION INTRAVENOUS; SUBCUTANEOUS EVERY 12 HOURS SCHEDULED
Status: DISCONTINUED | OUTPATIENT
Start: 2022-12-26 | End: 2022-12-29 | Stop reason: HOSPADM

## 2022-12-26 RX ORDER — METOCLOPRAMIDE 5 MG/1
5 TABLET ORAL 4 TIMES DAILY
COMMUNITY

## 2022-12-26 RX ORDER — METOPROLOL SUCCINATE 50 MG/1
50 TABLET, EXTENDED RELEASE ORAL DAILY
COMMUNITY

## 2022-12-26 RX ORDER — ACETAMINOPHEN 325 MG/1
650 TABLET ORAL EVERY 4 HOURS PRN
Status: DISCONTINUED | OUTPATIENT
Start: 2022-12-26 | End: 2022-12-29 | Stop reason: HOSPADM

## 2022-12-26 RX ORDER — DEXTROSE MONOHYDRATE 25 G/50ML
25 INJECTION, SOLUTION INTRAVENOUS
Status: DISCONTINUED | OUTPATIENT
Start: 2022-12-26 | End: 2022-12-29 | Stop reason: HOSPADM

## 2022-12-26 RX ORDER — ERGOCALCIFEROL 1.25 MG/1
50000 CAPSULE ORAL
COMMUNITY
Start: 2022-12-28

## 2022-12-26 RX ORDER — INSULIN ASPART 100 [IU]/ML
0-7 INJECTION, SOLUTION INTRAVENOUS; SUBCUTANEOUS
Status: DISCONTINUED | OUTPATIENT
Start: 2022-12-27 | End: 2022-12-29 | Stop reason: HOSPADM

## 2022-12-26 RX ORDER — MELATONIN 10 MG
30 CAPSULE ORAL NIGHTLY
COMMUNITY

## 2022-12-26 RX ORDER — ASPIRIN 81 MG/1
81 TABLET, CHEWABLE ORAL DAILY
COMMUNITY

## 2022-12-26 RX ORDER — ATORVASTATIN CALCIUM 40 MG/1
40 TABLET, FILM COATED ORAL NIGHTLY
Status: DISCONTINUED | OUTPATIENT
Start: 2022-12-26 | End: 2022-12-29 | Stop reason: HOSPADM

## 2022-12-26 RX ORDER — SIMVASTATIN 80 MG
80 TABLET ORAL DAILY
COMMUNITY
End: 2022-12-29 | Stop reason: HOSPADM

## 2022-12-26 RX ORDER — MIDODRINE HYDROCHLORIDE 5 MG/1
10 TABLET ORAL
Status: DISCONTINUED | OUTPATIENT
Start: 2022-12-27 | End: 2022-12-26

## 2022-12-26 RX ORDER — ACETAMINOPHEN 500 MG
1000 TABLET ORAL DAILY
COMMUNITY
End: 2022-12-29 | Stop reason: HOSPADM

## 2022-12-26 RX ORDER — METOPROLOL SUCCINATE 50 MG/1
50 TABLET, EXTENDED RELEASE ORAL DAILY
Status: DISCONTINUED | OUTPATIENT
Start: 2022-12-27 | End: 2022-12-29 | Stop reason: HOSPADM

## 2022-12-26 RX ORDER — METOCLOPRAMIDE 5 MG/1
5 TABLET ORAL 4 TIMES DAILY
Status: DISCONTINUED | OUTPATIENT
Start: 2022-12-26 | End: 2022-12-29 | Stop reason: HOSPADM

## 2022-12-26 RX ORDER — SODIUM CHLORIDE 9 MG/ML
40 INJECTION, SOLUTION INTRAVENOUS AS NEEDED
Status: DISCONTINUED | OUTPATIENT
Start: 2022-12-26 | End: 2022-12-29 | Stop reason: HOSPADM

## 2022-12-26 RX ORDER — SODIUM CHLORIDE 0.9 % (FLUSH) 0.9 %
10 SYRINGE (ML) INJECTION AS NEEDED
Status: DISCONTINUED | OUTPATIENT
Start: 2022-12-26 | End: 2022-12-29 | Stop reason: HOSPADM

## 2022-12-26 RX ORDER — ACETAMINOPHEN 650 MG/1
650 SUPPOSITORY RECTAL EVERY 4 HOURS PRN
Status: DISCONTINUED | OUTPATIENT
Start: 2022-12-26 | End: 2022-12-29 | Stop reason: HOSPADM

## 2022-12-26 RX ORDER — ASPIRIN 81 MG/1
81 TABLET, CHEWABLE ORAL DAILY
Status: DISCONTINUED | OUTPATIENT
Start: 2022-12-26 | End: 2022-12-29 | Stop reason: HOSPADM

## 2022-12-26 RX ORDER — TRAZODONE HYDROCHLORIDE 50 MG/1
50 TABLET ORAL NIGHTLY
Status: ON HOLD | COMMUNITY
End: 2022-12-26

## 2022-12-26 RX ORDER — INSULIN ASPART 100 [IU]/ML
15 INJECTION, SUSPENSION SUBCUTANEOUS 2 TIMES DAILY WITH MEALS
COMMUNITY

## 2022-12-26 RX ORDER — MIDODRINE HYDROCHLORIDE 5 MG/1
10 TABLET ORAL DAILY PRN
Status: DISCONTINUED | OUTPATIENT
Start: 2022-12-26 | End: 2022-12-29 | Stop reason: HOSPADM

## 2022-12-26 RX ORDER — ATORVASTATIN CALCIUM 80 MG/1
80 TABLET, FILM COATED ORAL DAILY
COMMUNITY
End: 2022-12-29 | Stop reason: HOSPADM

## 2022-12-26 RX ORDER — MULTIPLE VITAMINS W/ MINERALS TAB 9MG-400MCG
1 TAB ORAL DAILY
COMMUNITY
End: 2023-01-05 | Stop reason: HOSPADM

## 2022-12-26 RX ORDER — MIDODRINE HYDROCHLORIDE 10 MG/1
10 TABLET ORAL 3 TIMES WEEKLY
COMMUNITY

## 2022-12-26 RX ORDER — ONDANSETRON 2 MG/ML
4 INJECTION INTRAMUSCULAR; INTRAVENOUS EVERY 6 HOURS PRN
Status: DISCONTINUED | OUTPATIENT
Start: 2022-12-26 | End: 2022-12-29 | Stop reason: HOSPADM

## 2022-12-26 RX ORDER — NICOTINE POLACRILEX 4 MG
15 LOZENGE BUCCAL
Status: DISCONTINUED | OUTPATIENT
Start: 2022-12-26 | End: 2022-12-29 | Stop reason: HOSPADM

## 2022-12-26 RX ORDER — ACETAMINOPHEN 160 MG/5ML
650 SOLUTION ORAL EVERY 4 HOURS PRN
Status: DISCONTINUED | OUTPATIENT
Start: 2022-12-26 | End: 2022-12-29 | Stop reason: HOSPADM

## 2022-12-26 RX ORDER — LANOLIN ALCOHOL/MO/W.PET/CERES
6 CREAM (GRAM) TOPICAL NIGHTLY PRN
Status: DISCONTINUED | OUTPATIENT
Start: 2022-12-26 | End: 2022-12-28

## 2022-12-26 RX ADMIN — HEPARIN SODIUM 5000 UNITS: 5000 INJECTION INTRAVENOUS; SUBCUTANEOUS at 21:28

## 2022-12-26 RX ADMIN — ASPIRIN 81 MG: 81 TABLET, CHEWABLE ORAL at 21:29

## 2022-12-26 RX ADMIN — METOCLOPRAMIDE 5 MG: 5 TABLET ORAL at 21:29

## 2022-12-26 RX ADMIN — Medication 10 ML: at 21:29

## 2022-12-26 RX ADMIN — ATORVASTATIN CALCIUM 40 MG: 40 TABLET, FILM COATED ORAL at 21:29

## 2022-12-27 LAB
ANION GAP SERPL CALCULATED.3IONS-SCNC: 13 MMOL/L (ref 5–15)
BASOPHILS # BLD AUTO: 0.05 10*3/MM3 (ref 0–0.2)
BASOPHILS NFR BLD AUTO: 0.6 % (ref 0–1.5)
BUN SERPL-MCNC: 49 MG/DL (ref 8–23)
BUN/CREAT SERPL: 8.3 (ref 7–25)
CALCIUM SPEC-SCNC: 9.5 MG/DL (ref 8.6–10.5)
CHLORIDE SERPL-SCNC: 96 MMOL/L (ref 98–107)
CO2 SERPL-SCNC: 30 MMOL/L (ref 22–29)
CREAT SERPL-MCNC: 5.9 MG/DL (ref 0.76–1.27)
DEPRECATED RDW RBC AUTO: 53.4 FL (ref 37–54)
EGFRCR SERPLBLD CKD-EPI 2021: 9.8 ML/MIN/1.73
EOSINOPHIL # BLD AUTO: 0.12 10*3/MM3 (ref 0–0.4)
EOSINOPHIL NFR BLD AUTO: 1.4 % (ref 0.3–6.2)
ERYTHROCYTE [DISTWIDTH] IN BLOOD BY AUTOMATED COUNT: 16.2 % (ref 12.3–15.4)
GLUCOSE BLDC GLUCOMTR-MCNC: 195 MG/DL (ref 70–130)
GLUCOSE BLDC GLUCOMTR-MCNC: 216 MG/DL (ref 70–130)
GLUCOSE BLDC GLUCOMTR-MCNC: 227 MG/DL (ref 70–130)
GLUCOSE BLDC GLUCOMTR-MCNC: 251 MG/DL (ref 70–130)
GLUCOSE BLDC GLUCOMTR-MCNC: 316 MG/DL (ref 70–130)
GLUCOSE SERPL-MCNC: 198 MG/DL (ref 65–99)
HBV SURFACE AG SERPL QL IA: NORMAL
HCT VFR BLD AUTO: 33.9 % (ref 37.5–51)
HGB BLD-MCNC: 10.5 G/DL (ref 13–17.7)
IMM GRANULOCYTES # BLD AUTO: 0.04 10*3/MM3 (ref 0–0.05)
IMM GRANULOCYTES NFR BLD AUTO: 0.5 % (ref 0–0.5)
LYMPHOCYTES # BLD AUTO: 2.05 10*3/MM3 (ref 0.7–3.1)
LYMPHOCYTES NFR BLD AUTO: 24.4 % (ref 19.6–45.3)
MAGNESIUM SERPL-MCNC: 1.9 MG/DL (ref 1.6–2.4)
MCH RBC QN AUTO: 27.6 PG (ref 26.6–33)
MCHC RBC AUTO-ENTMCNC: 31 G/DL (ref 31.5–35.7)
MCV RBC AUTO: 89 FL (ref 79–97)
MONOCYTES # BLD AUTO: 1.07 10*3/MM3 (ref 0.1–0.9)
MONOCYTES NFR BLD AUTO: 12.7 % (ref 5–12)
NEUTROPHILS NFR BLD AUTO: 5.08 10*3/MM3 (ref 1.7–7)
NEUTROPHILS NFR BLD AUTO: 60.4 % (ref 42.7–76)
NRBC BLD AUTO-RTO: 0 /100 WBC (ref 0–0.2)
PLATELET # BLD AUTO: 152 10*3/MM3 (ref 140–450)
PMV BLD AUTO: 9.5 FL (ref 6–12)
POTASSIUM SERPL-SCNC: 4.7 MMOL/L (ref 3.5–5.2)
RBC # BLD AUTO: 3.81 10*6/MM3 (ref 4.14–5.8)
SODIUM SERPL-SCNC: 139 MMOL/L (ref 136–145)
WBC NRBC COR # BLD: 8.41 10*3/MM3 (ref 3.4–10.8)

## 2022-12-27 PROCEDURE — 25010000002 HEPARIN (PORCINE) PER 1000 UNITS: Performed by: INTERNAL MEDICINE

## 2022-12-27 PROCEDURE — G0378 HOSPITAL OBSERVATION PER HR: HCPCS

## 2022-12-27 PROCEDURE — 97165 OT EVAL LOW COMPLEX 30 MIN: CPT

## 2022-12-27 PROCEDURE — 87340 HEPATITIS B SURFACE AG IA: CPT | Performed by: INTERNAL MEDICINE

## 2022-12-27 PROCEDURE — 63710000001 INSULIN DETEMIR PER 5 UNITS

## 2022-12-27 PROCEDURE — 63710000001 INSULIN ASPART PER 5 UNITS: Performed by: INTERNAL MEDICINE

## 2022-12-27 PROCEDURE — 96372 THER/PROPH/DIAG INJ SC/IM: CPT

## 2022-12-27 PROCEDURE — 83735 ASSAY OF MAGNESIUM: CPT | Performed by: INTERNAL MEDICINE

## 2022-12-27 PROCEDURE — 97162 PT EVAL MOD COMPLEX 30 MIN: CPT

## 2022-12-27 PROCEDURE — 80048 BASIC METABOLIC PNL TOTAL CA: CPT | Performed by: INTERNAL MEDICINE

## 2022-12-27 PROCEDURE — 82962 GLUCOSE BLOOD TEST: CPT

## 2022-12-27 PROCEDURE — 85025 COMPLETE CBC W/AUTO DIFF WBC: CPT | Performed by: INTERNAL MEDICINE

## 2022-12-27 RX ADMIN — METOCLOPRAMIDE 5 MG: 5 TABLET ORAL at 17:46

## 2022-12-27 RX ADMIN — Medication 10 ML: at 08:04

## 2022-12-27 RX ADMIN — ATORVASTATIN CALCIUM 40 MG: 40 TABLET, FILM COATED ORAL at 20:47

## 2022-12-27 RX ADMIN — HEPARIN SODIUM 5000 UNITS: 5000 INJECTION INTRAVENOUS; SUBCUTANEOUS at 08:04

## 2022-12-27 RX ADMIN — ASPIRIN 81 MG: 81 TABLET, CHEWABLE ORAL at 08:04

## 2022-12-27 RX ADMIN — METOCLOPRAMIDE 5 MG: 5 TABLET ORAL at 11:08

## 2022-12-27 RX ADMIN — INSULIN DETEMIR 5 UNITS: 100 INJECTION, SOLUTION SUBCUTANEOUS at 21:18

## 2022-12-27 RX ADMIN — INSULIN ASPART 4 UNITS: 100 INJECTION, SOLUTION INTRAVENOUS; SUBCUTANEOUS at 11:08

## 2022-12-27 RX ADMIN — Medication 10 ML: at 21:19

## 2022-12-27 RX ADMIN — INSULIN ASPART 2 UNITS: 100 INJECTION, SOLUTION INTRAVENOUS; SUBCUTANEOUS at 08:04

## 2022-12-27 RX ADMIN — METOCLOPRAMIDE 5 MG: 5 TABLET ORAL at 08:04

## 2022-12-27 RX ADMIN — METOPROLOL SUCCINATE 50 MG: 50 TABLET, EXTENDED RELEASE ORAL at 08:04

## 2022-12-27 RX ADMIN — HEPARIN SODIUM 5000 UNITS: 5000 INJECTION INTRAVENOUS; SUBCUTANEOUS at 20:47

## 2022-12-27 RX ADMIN — METOCLOPRAMIDE 5 MG: 5 TABLET ORAL at 20:47

## 2022-12-27 RX ADMIN — INSULIN ASPART 3 UNITS: 100 INJECTION, SOLUTION INTRAVENOUS; SUBCUTANEOUS at 17:46

## 2022-12-27 NOTE — H&P
[facility] Medicine Admission      Date of Admission: 12/26/2022      Primary Care Physician: Provider, No Known      Chief Complaint: Syncope    HPI:Patient male with history of ESRD on HD, missed his dialysis last Friday due to weather issues, had a hemodialysis today and after taking 2.5 L out, he had a single episode of syncope. EMS was called and patient taken to [facility] for further management  Daughter states that when they take fluid out, he normally become kind out of it but this is the worst  The AVF on the left arm was redone a month ago    Concurrent Medical History:  has a past medical history of Atrial fibrillation (HCC), Blindness, Diabetes mellitus (HCC), Elevated cholesterol, Hypertension, Kidney disease, and Renal failure.he gets midodrine when he gets dialysis    Past Surgical History:  has no past surgical history on file.   AVFs    Family History: family history includes Cancer in his mother; Diabetes in an other family member; Hyperlipidemia in an other family member; Hypertension in an other family member; Kidney disease in an other family member.     Social History:  reports that he has never smoked. He has never used smokeless tobacco. He reports that he does not drink alcohol and does not use drugs.  POA is his daughter   Patient lives with his daughter   He is full code    Allergies: No Known Allergies    Medications:   Prior to Admission medications    Medication Sig Start Date End Date Taking? Authorizing Provider   acetaminophen (TYLENOL) 500 MG tablet Take 1,000 mg by mouth Daily.   Yes Dwaine Valentin MD   aspirin 81 MG chewable tablet Chew 81 mg Daily.   Yes Dwaine Valentin MD   atorvastatin (LIPITOR) 80 MG tablet Take 80 mg by mouth Daily.   Yes Dwaine Valentin MD   Ferric Citrate (AURYXIA PO) Take 210 mg by mouth 3 (Three) Times a Day.   Yes Dwaine Valentin MD   insulin aspart protamine-insulin aspart  (novoLOG 70/30) injection Inject 15 Units under the skin into the appropriate area as directed 2 (Two) Times a Day With Meals.   Yes Dwaine Valentin MD   Melatonin 10 MG capsule Take 30 mg by mouth Every Night.   Yes Dwaine Valentin MD   metoclopramide (REGLAN) 5 MG tablet Take 5 mg by mouth 4 (Four) Times a Day.   Yes Dwaine Valentin MD   metoprolol succinate XL (TOPROL-XL) 50 MG 24 hr tablet Take 50 mg by mouth Daily.   Yes Dwaine Valentin MD   midodrine (PROAMATINE) 10 MG tablet Take 10 mg by mouth 3 (Three) Times a Week. 30 minutes before dialysis   Yes Dwaine Valentin MD   SEVELAMER HCL PO Take 800 mg by mouth Daily With Dinner.   Yes Dwaine Valentin MD   vitamin D (ERGOCALCIFEROL) 1.25 MG (62576 UT) capsule capsule Take 50,000 Units by mouth Every 7 (Seven) Days. 12/28/22  Yes Dwaine Valentin MD   multivitamin with minerals tablet tablet Take 1 tablet by mouth Daily.    Dwaine Valentin MD   simvastatin (ZOCOR) 80 MG tablet Take 80 mg by mouth Daily.    Dwaine Valentin MD   traZODone (DESYREL) 50 MG tablet Take 50 mg by mouth Every Night.  12/26/22  Dwaine Valentin MD         No current facility-administered medications for this encounter.      Review of Systems:  Review of Systems   All other systems reviewed and are negative.     Otherwise complete ROS is negative except as mentioned above.    Physical Exam:   Temp:  [97.9 °F (36.6 °C)] 97.9 °F (36.6 °C)  Heart Rate:  [83] 83  Resp:  [20] 20  BP: (176)/(85) 176/85  Physical Exam  Vitals reviewed.   Constitutional:       Comments: Shaking bilateral upper extremities    HENT:      Head: Normocephalic.      Nose: Nose normal.      Mouth/Throat:      Mouth: Mucous membranes are moist.   Eyes:      Extraocular Movements: Extraocular movements intact.   Cardiovascular:      Rate and Rhythm: Normal rate and regular rhythm.      Pulses: Normal pulses.      Heart sounds: Normal heart sounds.   Pulmonary:       Effort: Pulmonary effort is normal.      Breath sounds: Normal breath sounds.   Abdominal:      Palpations: Abdomen is soft.   Musculoskeletal:         General: Normal range of motion.      Cervical back: Normal range of motion and neck supple.      Right lower leg: No edema.      Left lower leg: No edema.   Skin:     General: Skin is warm.   Neurological:      General: No focal deficit present.      Mental Status: He is alert. Mental status is at baseline.   Psychiatric:         Mood and Affect: Mood normal.           Results Reviewed:  I have personally reviewed current lab, radiology, and data and agree with results.  Lab Results (last 24 hours)     ** No results found for the last 24 hours. **        Imaging Results (Last 24 Hours)     ** No results found for the last 24 hours. **        Assessment and Plan    Syncope/confusion     This is not the first time that has happened a similar episode after dialysis; monitor overnight     He is close to his normal self     PT and OT tomorrow am     ESRD     Continue with hemodialysis as nephrologist will schedule it     Chronic medical problems     Type II DM     Essential hypertension      Clifton Davis MD

## 2022-12-27 NOTE — THERAPY EVALUATION
Patient Name: Austin Egan  : 1955    MRN: [id number]                              Today's Date: 2022       Admit Date: 2022    Visit Dx:     ICD-10-CM ICD-9-CM   1. Impaired functional mobility, balance, gait, and endurance  Z74.09 V49.89     Patient Active Problem List   Diagnosis   • Confusion and disorientation     Past Medical History:   Diagnosis Date   • Atrial fibrillation (HCC)    • Blindness    • Diabetes mellitus (HCC)    • Elevated cholesterol    • Hypertension    • Kidney disease    • Renal failure      History reviewed. No pertinent surgical history.   General Information     Row Name 22          Physical Therapy Time and Intention    Document Type evaluation  -CZ     Mode of Treatment physical therapy;occupational therapy  -CZ     Row Name 22          General Information    Patient Profile Reviewed yes  -CZ     Prior Level of Function independent:;all household mobility  -CZ     Existing Precautions/Restrictions fall  -CZ     Barriers to Rehab visual deficit  -CZ     Row Name 22          Living Environment    People in Home child(niko), adult  -CZ     Row Name 22 [facility]          Home Main Entrance    Number of Stairs, Main Entrance other (see comments)  -CZ     Row Name 22          Stairs Within Home, Primary    Stairs, Within Home, Primary Walks with a quad cane, Tub/shower shower, with a tub bench, Raised toilet at home with rails. Has a ramp to enter home. SW at home, does not use. Reports he is active with home health PT x 1 visit. Usually takes PACS to dialysis MWF.  -CZ     Number of Stairs, Within Home, Primary none  -CZ     Row Name 22          Cognition    Orientation Status (Cognition) oriented to;person;place;verbal cues/prompts needed for orientation;time  -CZ     Row Name 22          Safety Issues, Functional Mobility    Impairments Affecting Function (Mobility) strength;endurance/activity  tolerance;balance  -CZ           User Key  (r) = Recorded By, (t) = Taken By, (c) = Cosigned By    Initials Name Provider Type    CZ John Paul Thakur, PT Physical Therapist               Mobility     Row Name 12/27/22 0816          Bed Mobility    Bed Mobility supine-sit;sit-supine  -CZ     Supine-Sit [place] (Bed Mobility) supervision  -CZ     Sit-Supine [place] (Bed Mobility) supervision  -CZ     Assistive Device (Bed Mobility) head of bed elevated  -CZ     Row Name 12/27/22 0816          Sit-Stand Transfer    Sit-Stand [place] (Transfers) contact guard  -CZ     Row Name 12/27/22 0816          Gait/Stairs (Locomotion)    [place] Level (Gait) contact guard  -CZ     Assistive Device (Gait) walker, front-wheeled  -CZ     Distance in Feet (Gait) 10'x2 without AD, 35' x 1 with FWW.  -CZ     Comment, (Gait/Stairs) Stooped posture, decreased step length; distance limited by nausea/vomiting.  -CZ           User Key  (r) = Recorded By, (t) = Taken By, (c) = Cosigned By    Initials Name Provider Type    CZ John Paul Thakur, PT Physical Therapist               Obj/Interventions     Row Name 12/27/22 0816          Range of Motion Comprehensive    General Range of Motion bilateral lower extremity ROM WFL  -CZ     Row Name 12/27/22 0816          Strength Comprehensive (MMT)    Comment, General Manual Muscle Testing (MMT) Assessment BLEs: 4/5 grossly.  -CZ     Row Name 12/27/22 0816          Sensory Assessment (Somatosensory)    Sensory Assessment (Somatosensory) LE sensation intact  -CZ           User Key  (r) = Recorded By, (t) = Taken By, (c) = Cosigned By    Initials Name Provider Type    CZ John Paul Thakur, PT Physical Therapist               Goals/Plan     Row Name 12/27/22 0816          Bed Mobility Goal 1 (PT)    Activity/Assistive Device (Bed Mobility Goal 1, PT) sit to supine/supine to sit  -CZ     [place] Level/Cues Needed (Bed Mobility Goal 1, PT) independent  -CZ     Time Frame (Bed  Mobility Goal 1, PT) by discharge  -CZ     Strategies/Barriers (Bed Mobility Goal 1, PT) HOB flat, no bed rails.  -CZ     Progress/Outcomes (Bed Mobility Goal 1, PT) goal not met  -CZ     Row Name 12/27/22 0816          Transfer Goal 1 (PT)    Activity/Assistive Device (Transfer Goal 1, PT) sit-to-stand/stand-to-sit;bed-to-chair/chair-to-bed  -CZ     [place] Level/Cues Needed (Transfer Goal 1, PT) independent  -CZ     Strategies/Barriers (Transfers Goal 1, PT) Blind.  -CZ     Progress/Outcome (Transfer Goal 1, PT) goal not met  -CZ     Row Name 12/27/22 0816          Gait Training Goal 1 (PT)    Activity/Assistive Device (Gait Training Goal 1, PT) gait (walking locomotion)  -CZ     Harris Level (Gait Training Goal 1, PT) independent  -CZ     Distance (Gait Training Goal 1, PT) 150'x1.  -CZ     Strategies/Barriers (Gait Training Goal 1, PT) Blind.  -CZ     Progress/Outcome (Gait Training Goal 1, PT) goal not met  -CZ     Row Name 12/27/22 0816          Problem Specific Goal 1 (PT)    Problem Specific Goal 1 (PT) Score 27/28 on Tinetti fall risk assessment.  -CZ     Time Frame (Problem Specific Goal 1, PT) by discharge  -CZ     Strategies/Barriers (Problem Specific Goal 1, PT) Blind.  -CZ     Progress/Outcome (Problem Specific Goal 1, PT) goal not met  -CZ     Row Name 12/27/22 0816          Therapy Assessment/Plan (PT)    Planned Therapy Interventions (PT) balance training;bed mobility training;gait training;patient/family education;transfer training;stretching;strengthening  -CZ           User Key  (r) = Recorded By, (t) = Taken By, (c) = Cosigned By    Initials Name Provider Type    CZ John Paul Thakur, PT Physical Therapist               Clinical Impression     Row Name 12/27/22 0816          Pain    Pretreatment Pain Rating 0/10 - no pain  -CZ     Posttreatment Pain Rating 0/10 - no pain  -CZ     Row Name 12/27/22 0816          Plan of Care Review    Plan of Care Reviewed With patient  -CZ     Outcome  Evaluation Initial PT evaluation complete, co-evaluation with OT.  Patient is alert, cooperative, flat affect.  He requires SPV with bed mobility, CGA with transfers and gait, ambulating 10'x2 without an AD, but reaches for UE support.  Then ambulates 35'x1 with FWW, stooped posture, decreased step length; distance limited by nausea and vomiting.  Patient returned safely to room via rolling recliner, RN notified.  Patient has assitance from daughter at home, has walker available.  He was active with HHPT prior to admission, would benefit from continuing with HHPT upon discharge.  Goals established, continue skilled I/P PT.  -     Row Name 12/27/22 0816          Therapy Assessment/Plan (PT)    Rehab Potential (PT) good, to achieve stated therapy goals  -     Criteria for Skilled Interventions Met (PT) yes;skilled treatment is necessary  -     Therapy Frequency (PT) 5 times/wk  -CZ     Row Name 12/27/22 0816          Vital Signs    Pre Systolic BP Rehab 144  -CZ     Pre Treatment Diastolic BP 67  -CZ     Post Systolic BP Rehab 154  -CZ     Post Treatment Diastolic BP 68  -CZ     Pretreatment Heart Rate (beats/min) 92  -CZ     Posttreatment Heart Rate (beats/min) 92  -CZ     Pre SpO2 (%) 93  -CZ     O2 Delivery Pre Treatment room air  -CZ     Post SpO2 (%) 93  -CZ     O2 Delivery Post Treatment room air  -CZ     Pre Patient Position Supine  -CZ     Post Patient Position Supine  -CZ     Row Name 12/27/22 0816          Positioning and Restraints    Pre-Treatment Position in bed  -CZ     Post Treatment Position bed  -CZ     In Bed supine;call light within reach;encouraged to call for assist;exit alarm on  -CZ           User Key  (r) = Recorded By, (t) = Taken By, (c) = Cosigned By    Initials Name Provider Type    CZ John Paul Thakur, PT Physical Therapist               Outcome Measures     Row Name 12/27/22 0816 12/26/22 0705       How much help from another person do you currently need...    Turning from your back  to your side while in flat bed without using bedrails? 3  -CZ 3  -SB    Moving from lying on back to sitting on the side of a flat bed without bedrails? 3  -CZ 2  -SB    Moving to and from a bed to a chair (including a wheelchair)? 3  -CZ 2  -SB    Standing up from a chair using your arms (e.g., wheelchair, bedside chair)? 3  -CZ 2  -SB    Climbing 3-5 steps with a railing? 3  -CZ 1  -SB    To walk in hospital room? 3  -CZ 1  -SB    AM-PAC 6 Clicks Score (PT) 18  -CZ 11  -SB    Highest level of mobility 6 --> Walked 10 steps or more  -CZ 4 --> Transferred to chair/commode  -SB    Row Name 12/27/22 0816 12/27/22 0815       Functional Assessment    Outcome Measure Options AM-PAC 6 Clicks Basic Mobility (PT)  -CZ AM-PAC 6 Clicks Daily Activity (OT)  -          User Key  (r) = Recorded By, (t) = Taken By, (c) = Cosigned By    Initials Name Provider Type    CZ John Paul Thakur, PT Physical Therapist    SJ Maycol Wells, OT Occupational Therapist    SB Angela Figueroa, RN Registered Nurse                             Physical Therapy Education     Title: PT OT SLP Therapies (In Progress)     Topic: Physical Therapy (In Progress)     Point: Mobility training (Done)     Learning Progress Summary           Patient Acceptance, E, VU,NR by  at 12/27/2022 0901    Comment: PT POC, hand placement with transfers, proper use of walker.                   Point: Home exercise program (Not Started)     Learner Progress:  Not documented in this visit.          Point: Body mechanics (Not Started)     Learner Progress:  Not documented in this visit.          Point: Precautions (Not Started)     Learner Progress:  Not documented in this visit.                      User Key     Initials Effective Dates Name Provider Type Discipline     09/18/22 -  John Paul Thakur, PT Physical Therapist PT              PT Recommendation and Plan  Planned Therapy Interventions (PT): balance training, bed mobility training, gait training,  patient/family education, transfer training, stretching, strengthening  Plan of Care Reviewed With: patient  Outcome Evaluation: Initial PT evaluation complete, co-evaluation with OT.  Patient is alert, cooperative, flat affect.  He requires SPV with bed mobility, CGA with transfers and gait, ambulating 10'x2 without an AD, but reaches for UE support.  Then ambulates 35'x1 with FWW, stooped posture, decreased step length; distance limited by nausea and vomiting.  Patient returned safely to room via rolling recliner, RN notified.  Patient has assitance from daughter at home, has walker available.  He was active with HHPT prior to admission, would benefit from continuing with HHPT upon discharge.  Goals established, continue skilled I/P PT.     Time Calculation:    PT Charges     Row Name 12/27/22 0908             Time Calculation    Start Time 0815  -CZ      Stop Time 0908  -CZ      Time Calculation (min) 53 min  -CZ      PT Received On 12/27/22  -CZ      PT Goal Re-Cert Due Date 01/09/23  -CZ         Untimed Charges    PT Eval/Re-eval Minutes 53  -CZ         Total Minutes    Untimed Charges Total Minutes 53  -CZ       Total Minutes 53  -CZ            User Key  (r) = Recorded By, (t) = Taken By, (c) = Cosigned By    Initials Name Provider Type    CZ John Paul Thakur, PT Physical Therapist              Therapy Charges for Today     Code Description Service Date Service Provider Modifiers Qty    71717901059 HC PT EVAL MOD COMPLEXITY 4 12/27/2022 John Paul Thakur, PT GP 1          PT G-Codes  Outcome Measure Options: AM-PAC 6 Clicks Basic Mobility (PT)  AM-PAC 6 Clicks Score (PT): 18  AM-PAC 6 Clicks Score (OT): 20       John Paul Thakur PT  12/27/2022

## 2022-12-27 NOTE — CONSULTS
[facility]  [address] [place] [address]. [id number]   - [phone]    [phone]     Consultation         PATIENT  DEMOGRAPHICS   PATIENT NAME: Austin Egan                      PHYSICIAN: Dewey Jessica MD  : 1955  MRN: [id number]    Subjective   SUBJECTIVE   Referring Provider: DR Davis   Reason for Consultation: esrd on hd   History of present illness:       Patient has a past medical history of Atrial fibrillation (HCC), Blindness, Diabetes mellitus (HCC), Elevated cholesterol, Hypertension, Kidney disease, and Renal failure.he gets midodrine when he gets dialysis   He is here because he missed dialysis         Past Medical History:   Diagnosis Date   • Atrial fibrillation (HCC)    • Blindness    • Diabetes mellitus (HCC)    • Elevated cholesterol    • Hypertension    • Kidney disease    • Renal failure      History reviewed. No pertinent surgical history.  Family History   Problem Relation Age of Onset   • Cancer Mother    • Hyperlipidemia Other    • Kidney disease Other    • Hypertension Other    • Diabetes Other      Social History     Tobacco Use   • Smoking status: Never   • Smokeless tobacco: Never   Substance Use Topics   • Alcohol use: Never   • Drug use: Never     Allergies:  Patient has no known allergies.     REVIEW OF SYSTEMS    ROS -negative for all systems   Objective   OBJECTIVE   Vital Signs  Temp:  [97.2 °F (36.2 °C)-97.9 °F (36.6 °C)] 97.2 °F (36.2 °C)  Heart Rate:  [83-91] 83  Resp:  [18-20] 18  BP: (109-176)/(57-85) 152/72         I/O last 3 completed shifts:  In: -   Out: 200 [Urine:200]     PHYSICAL EXAM    Physical Exam  Vitals reviewed.   Constitutional:       Appearance: Normal appearance.   HENT:      Head: Normocephalic.      Right Ear: Tympanic membrane normal.      Nose: Nose normal.      Mouth/Throat:      Mouth: Mucous membranes are moist.   Eyes:      Conjunctiva/sclera: Conjunctivae normal.      Pupils: Pupils are equal, round, and  reactive to light.   Neck:      Vascular: Carotid bruit:      Cardiovascular:      Rate and Rhythm: Normal rate.      Pulses: Normal pulses.   Pulmonary:      Effort: Pulmonary effort is normal.   Abdominal:      General: Abdomen is flat.   Musculoskeletal:         General: Normal range of motion.      Cervical back: Normal range of motion.   Skin:     General: Skin is warm.   Neurological:      General: No focal deficit present.      Mental Status: He is alert.   Psychiatric:         Mood and Affect: Mood normal.         RESULTS   Results Review:    Results from last 7 days   Lab Units 12/27/22  0607 12/26/22  1855   SODIUM mmol/L 139 138   POTASSIUM mmol/L 4.7 4.6   CHLORIDE mmol/L 96* 94*   CO2 mmol/L 30.0* 29.0   BUN mg/dL 49* 44*   CREATININE mg/dL 5.90* 5.72*   CALCIUM mg/dL 9.5 9.5   BILIRUBIN mg/dL  --  0.4   ALK PHOS U/L  --  138*   ALT (SGPT) U/L  --  20   AST (SGOT) U/L  --  19   GLUCOSE mg/dL 198* 230*       CrCl cannot be calculated (Unknown ideal weight.).    Results from last 7 days   Lab Units 12/27/22  0607   MAGNESIUM mg/dL 1.9             Results from last 7 days   Lab Units 12/27/22  0607 12/26/22  1855   WBC 10*3/mm3 8.41 11.69*   HEMOGLOBIN g/dL 10.5* 10.8*   PLATELETS 10*3/mm3 152 143              MEDICATIONS    aspirin, 81 mg, Oral, Daily  atorvastatin, 40 mg, Oral, Nightly  heparin (porcine), 5,000 Units, Subcutaneous, Q12H  Insulin Aspart, 0-7 Units, Subcutaneous, TID AC  metoclopramide, 5 mg, Oral, 4x Daily  metoprolol succinate XL, 50 mg, Oral, Daily  sodium chloride, 10 mL, Intravenous, Q12H         Medications Prior to Admission   Medication Sig Dispense Refill Last Dose   • acetaminophen (TYLENOL) 500 MG tablet Take 1,000 mg by mouth Daily.   12/26/2022   • aspirin 81 MG chewable tablet Chew 81 mg Daily.   12/26/2022   • atorvastatin (LIPITOR) 80 MG tablet Take 80 mg by mouth Daily.   12/25/2022   • Ferric Citrate (AURYXIA PO) Take 210 mg by mouth 3 (Three) Times a Day.   12/25/2022    • insulin aspart protamine-insulin aspart (novoLOG 70/30) injection Inject 15 Units under the skin into the appropriate area as directed 2 (Two) Times a Day With Meals.   12/25/2022   • Melatonin 10 MG capsule Take 30 mg by mouth Every Night.   12/25/2022   • metoclopramide (REGLAN) 5 MG tablet Take 5 mg by mouth 4 (Four) Times a Day.   12/26/2022   • metoprolol succinate XL (TOPROL-XL) 50 MG 24 hr tablet Take 50 mg by mouth Daily.   12/26/2022   • midodrine (PROAMATINE) 10 MG tablet Take 10 mg by mouth 3 (Three) Times a Week. 30 minutes before dialysis   12/26/2022   • SEVELAMER HCL PO Take 800 mg by mouth Daily With Dinner.   12/25/2022   • [START ON 12/28/2022] vitamin D (ERGOCALCIFEROL) 1.25 MG (87015 UT) capsule capsule Take 50,000 Units by mouth Every 7 (Seven) Days.   12/25/2022   • multivitamin with minerals tablet tablet Take 1 tablet by mouth Daily.   Unknown   • simvastatin (ZOCOR) 80 MG tablet Take 80 mg by mouth Daily.   Unknown     Assessment & Plan   ASSESSMENT / PLAN      Confusion and disorientation    1. ESRD on hd   HD MWF  Plan for HD 12/28/22 3.5 hours 3 k bath 2 liter UF     2. HTN  Modulate through hd   continue home medications    3. Anemia     4. HLD     5. Hyperkalemia   Modulate through hd          I discussed the patients findings and my recommendations with patient    This document has been electronically signed by Dewey Jessica MD on December 27, 2022 17:10 CST

## 2022-12-27 NOTE — PROGRESS NOTES
[facility]  INPATIENT PROGRESS NOTE    Length of Stay: 1  Date of Admission: 12/26/2022  Primary Care Physician: ProviderCarissa Known    Subjective   (S) more alert; no nausea, no vomiting, no fever    Review of Systems   All other systems reviewed and are negative.       All pertinent negatives and positives are as above. All other systems have been reviewed and are negative unless otherwise stated.     Prior to Admission medications    Medication Sig Start Date End Date Taking? Authorizing Provider   acetaminophen (TYLENOL) 500 MG tablet Take 1,000 mg by mouth Daily.   Yes Dwaine Valentin MD   aspirin 81 MG chewable tablet Chew 81 mg Daily.   Yes Dwaine Valentin MD   atorvastatin (LIPITOR) 80 MG tablet Take 80 mg by mouth Daily.   Yes Dwaine Valentin MD   Ferric Citrate (AURYXIA PO) Take 210 mg by mouth 3 (Three) Times a Day.   Yes Dwaine Valentin MD   insulin aspart protamine-insulin aspart (novoLOG 70/30) injection Inject 15 Units under the skin into the appropriate area as directed 2 (Two) Times a Day With Meals.   Yes Dwaine Valentin MD   Melatonin 10 MG capsule Take 30 mg by mouth Every Night.   Yes Dwaine Valentin MD   metoclopramide (REGLAN) 5 MG tablet Take 5 mg by mouth 4 (Four) Times a Day.   Yes Dwaine Valentin MD   metoprolol succinate XL (TOPROL-XL) 50 MG 24 hr tablet Take 50 mg by mouth Daily.   Yes Dwaine Valentin MD   midodrine (PROAMATINE) 10 MG tablet Take 10 mg by mouth 3 (Three) Times a Week. 30 minutes before dialysis   Yes Dwaine Valentin MD   SEVELAMER HCL PO Take 800 mg by mouth Daily With Dinner.   Yes Dwaine Valentin MD   vitamin D (ERGOCALCIFEROL) 1.25 MG (44815 UT) capsule capsule Take 50,000 Units by mouth Every 7 (Seven) Days. 12/28/22  Yes Dwaine Valentin MD   multivitamin with minerals tablet tablet Take 1 tablet by mouth Daily.    Dwaine Valentin MD    simvastatin (ZOCOR) 80 MG tablet Take 80 mg by mouth Daily.    Provider, MD Dwaine       aspirin, 81 mg, Oral, Daily  atorvastatin, 40 mg, Oral, Nightly  heparin (porcine), 5,000 Units, Subcutaneous, Q12H  Insulin Aspart, 0-7 Units, Subcutaneous, TID AC  metoclopramide, 5 mg, Oral, 4x Daily  metoprolol succinate XL, 50 mg, Oral, Daily  sodium chloride, 10 mL, Intravenous, Q12H           Objective    Temp:  [97.2 °F (36.2 °C)-97.9 °F (36.6 °C)] 97.2 °F (36.2 °C)  Heart Rate:  [83-91] 86  Resp:  [18-20] 18  BP: (109-176)/(57-85) 150/70    Physical Exam  HENT:      Head: Normocephalic.      Nose: Nose normal.   Eyes:      Extraocular Movements: Extraocular movements intact.   Cardiovascular:      Rate and Rhythm: Regular rhythm.      Heart sounds: Normal heart sounds.   Pulmonary:      Effort: Pulmonary effort is normal.      Breath sounds: Normal breath sounds.   Abdominal:      General: Bowel sounds are normal.   Musculoskeletal:         General: Normal range of motion.      Cervical back: Neck supple.   Skin:     General: Skin is warm.   Neurological:      General: No focal deficit present.      Mental Status: He is alert. Mental status is at baseline.      Comments: He has resting tremor; walked with small steps; and stooped posture   Psychiatric:         Behavior: Behavior normal.         Results Review:  I have reviewed the labs, radiology results, and diagnostic studies.    Laboratory Data:   Results from last 7 days   Lab Units 12/27/22  0607 12/26/22  1855   SODIUM mmol/L 139 138   POTASSIUM mmol/L 4.7 4.6   CHLORIDE mmol/L 96* 94*   CO2 mmol/L 30.0* 29.0   BUN mg/dL 49* 44*   CREATININE mg/dL 5.90* 5.72*   GLUCOSE mg/dL 198* 230*   CALCIUM mg/dL 9.5 9.5   BILIRUBIN mg/dL  --  0.4   ALK PHOS U/L  --  138*   ALT (SGPT) U/L  --  20   AST (SGOT) U/L  --  19   ANION GAP mmol/L 13.0 15.0     CrCl cannot be calculated (Unknown ideal weight.).  Results from last 7 days   Lab Units 12/27/22  0607   MAGNESIUM  mg/dL 1.9         Results from last 7 days   Lab Units 12/27/22  0607 12/26/22  1855   WBC 10*3/mm3 8.41 11.69*   HEMOGLOBIN g/dL 10.5* 10.8*   HEMATOCRIT % 33.9* 34.2*   PLATELETS 10*3/mm3 152 143           Culture Data:   No results found for: BLOODCX  No results found for: URINECX  No results found for: RESPCX  No results found for: WOUNDCX  No results found for: STOOLCX  No components found for: BODYFLD    Radiology Data:   Imaging Results (Last 24 Hours)     Procedure Component Value Units Date/Time    XR Chest 1 View [[id number]] Collected: 12/26/22 1943     Updated: 12/26/22 2325    Narrative:      XR CHEST 1 VIEW    COMPARISONS: September 30, 2022    ADDITIONAL PERTINENT HISTORY: Syncope    FINDINGS:    Life-support:  Right internal jugular dual lumen catheter with  its tips at the cavoatrial junction.    Cardiomediastinal silhouette:  Moderate cardiomegaly.    Pulmonary vasculature:  Findings of mild pulmonary edema appear    Lung fields:  Negative.    Pleural spaces: Negative.    Osseous structures:  Mild osteoarthritic changes involving the  left acromioclavicular joint.    Surrounding soft tissues:  Negative.        Impression:      1. Findings concerning for mild congestive heart failure    Electronically signed by:  Aníbal Amin MD  12/26/2022 11:23 PM CST  Workstation: [id number]          I have reviewed the patient's current medications.     Assessment/Plan     Syncope/confusion     resolved; he is back to his baseline; he participated with PT and OT     PT and OT working with him     ESRD     Continue with hemodialysis as nephrologist will schedule it      According to his daughter, he does not get confused when the AVF gets use but when he uses the perm cath he does not get confused; he has a history of non fixable of neck stenosis, from [place]      Chronic medical problems     Type II DM     Essential hypertension    It looks like he has Parkinson disease; discussed with his  daughter    Disposition with HH  PT after dialysis    Clifton Davis MD

## 2022-12-27 NOTE — PLAN OF CARE
Goal Outcome Evaluation:  Plan of Care Reviewed With: patient           Outcome Evaluation: Initial PT evaluation complete, co-evaluation with OT.  Patient is alert, cooperative, flat affect.  He requires SPV with bed mobility, CGA with transfers and gait, ambulating 10'x2 without an AD, but reaches for UE support.  Then ambulates 35'x1 with FWW, stooped posture, decreased step length; distance limited by nausea and vomiting.  Patient returned safely to room via rolling recliner, RN notified.  Patient has assitance from daughter at home, has walker available.  He was active with HHPT prior to admission, would benefit from continuing with HHPT upon discharge.  Goals established, continue skilled I/P PT.

## 2022-12-27 NOTE — PLAN OF CARE
Goal Outcome Evaluation:  Plan of Care Reviewed With: patient           Outcome Evaluation: OT eval complete, co-eval with PT, supine in bed, alert to self and place, cues needed for date and situation. Supine<>sit with supervision. Sit to stand, toilet t/f, and functional mobility with CGA and RW. LE dressing with SBA. LE dressing, toileting, and grooming standing at sink with CGA. With functional mobility, in hallway, patient with x 2 episode of emesis, returned to bed. Patient did report feeling better post emesis episode. All needs in reach. Patient with decreased balance, shuffling gait, low vision, decreased independence in ADLs, and decreased safety in transfers. Cont inpatient OT. Recommend home with home health OT.

## 2022-12-27 NOTE — THERAPY EVALUATION
Patient Name: Austin Egan  : 1955    MRN: [id number]                              Today's Date: 2022       Admit Date: 2022    Visit Dx:     ICD-10-CM ICD-9-CM   1. Impaired functional mobility, balance, gait, and endurance  Z74.09 V49.89   2. Impaired mobility and ADLs  Z74.09 V49.89    Z78.9      Patient Active Problem List   Diagnosis   • Confusion and disorientation     Past Medical History:   Diagnosis Date   • Atrial fibrillation (HCC)    • Blindness    • Diabetes mellitus (HCC)    • Elevated cholesterol    • Hypertension    • Kidney disease    • Renal failure      History reviewed. No pertinent surgical history.   General Information     Row Name 22          OT Time and Intention    Document Type evaluation  -     Mode of Treatment physical therapy;occupational therapy  -[facility] Name 22          General Information    Patient Profile Reviewed yes  -     Prior Level of Function independent:;gait;transfer;bed mobility;ADL's;feeding;grooming;dressing;bathing;dependent:;home management;cooking;cleaning;driving;shopping  -     Existing Precautions/Restrictions fall;other (see comments)  L UE restricted (fistula)  -     Row Name 22          Living Environment    People in Home child(niko), adult  daughter  -[facility] Name 22          Stairs Within Home, Primary    Stairs, Within Home, Primary Walks with a quad cane, Tub/shower shower, with a tub bench, Raised toilet at home with rails. Has a ramp to enter home. Has a  walker at home, does not use. Reports he is active with home health PT. Usually takes PACS to dialysis MWF.  -     Stairs Comment, Within Home, Primary Has a basement, does not use.  -     Row Name 22          Cognition    Orientation Status (Cognition) oriented to;person;place;verbal cues/prompts needed for orientation;time  -[facility] Name 22          Safety Issues, Functional Mobility     Safety Issues Affecting Function (Mobility) safety precaution awareness  -     Impairments Affecting Function (Mobility) balance;endurance/activity tolerance  -           User Key  (r) = Recorded By, (t) = Taken By, (c) = Cosigned By    Initials Name Provider Type     Maycol Wells OT Occupational Therapist                 Mobility/ADL's     Row Name 12/27/22 0815          Bed Mobility    Bed Mobility supine-sit;sit-supine  -SJ     Supine-Sit [place] (Bed Mobility) supervision  -     Sit-Supine [place] (Bed Mobility) supervision  -     Assistive Device (Bed Mobility) bed rails;head of bed elevated  -     Row Name 12/27/22 0815          Transfers    Transfers sit-stand transfer;toilet transfer  -     Row Name 12/27/22 0815          Sit-Stand Transfer    Sit-Stand [place] (Transfers) contact guard  -     Assistive Device (Sit-Stand Transfers) walker, front-wheeled  -     Row Name 12/27/22 0815          Toilet Transfer    Type (Toilet Transfer) sit-stand;stand-sit  -     [place] Level (Toilet Transfer) contact guard  -     Assistive Device (Toilet Transfer) walker, front-wheeled  -     Comment, (Toilet Transfer) commode over toilet  -     Row Name 12/27/22 0815          Activities of Daily Living    BADL Assessment/Intervention lower body dressing;grooming;toileting  -     Row Name 12/27/22 0815          Lower Body Dressing Assessment/Training    [place] Level (Lower Body Dressing) doff;don;socks;standby assist  -     Position (Lower Body Dressing) edge of bed sitting  -     Row Name 12/27/22 0815          Grooming Assessment/Training    [place] Level (Grooming) contact guard assist;oral care regimen;wash face, hands  -     Position (Grooming) sink side  -     Row Name 12/27/22 0815          Toileting Assessment/Training    [place] Level (Toileting) contact guard assist  -     Position (Toileting) supported standing  -           User Key   (r) = Recorded By, (t) = Taken By, (c) = Cosigned By    Initials Name Provider Type    Maycol Blum OT Occupational Therapist               Obj/Interventions     Row Name 12/27/22 0815          Sensory Assessment (Somatosensory)    Sensory Assessment (Somatosensory) UE sensation intact  -     Sensory Assessment L forearm with numbness at fistula site (some swelling noted)  -     Row Name 12/27/22 0815          Range of Motion Comprehensive    General Range of Motion bilateral upper extremity ROM WFL  -     Row Name 12/27/22 0815          Strength Comprehensive (MMT)    General Manual Muscle Testing (MMT) Assessment other (see comments)  -     Comment, General Manual Muscle Testing (MMT) Assessment bue 4/5 grossly  -           User Key  (r) = Recorded By, (t) = Taken By, (c) = Cosigned By    Initials Name Provider Type    Maycol Blum OT Occupational Therapist               Goals/Plan     Row Name 12/27/22 0815          Transfer Goal 1 (OT)    Activity/Assistive Device (Transfer Goal 1, OT) toilet  -     [place] Level/Cues Needed (Transfer Goal 1, OT) modified independence  -SJ     Time Frame (Transfer Goal 1, OT) long term goal (LTG);by discharge  -SJ     Progress/Outcome (Transfer Goal 1, OT) goal not met  -[facility] Name 12/27/22 0815          Bathing Goal 1 (OT)    Activity/Device (Bathing Goal 1, OT) lower body bathing  -     [place] Level/Cues Needed (Bathing Goal 1, OT) modified independence  -SJ     Time Frame (Bathing Goal 1, OT) long term goal (LTG);by discharge  -     Progress/Outcomes (Bathing Goal 1, OT) goal not met  -[facility] Name 12/27/22 0815          Toileting Goal 1 (OT)    Activity/Device (Toileting Goal 1, OT) toileting skills, all  -SJ     [place] Level/Cues Needed (Toileting Goal 1, OT) independent  -SJ     Time Frame (Toileting Goal 1, OT) long term goal (LTG);by discharge  -SJ     Progress/Outcome (Toileting Goal 1, OT) goal not met  -      Row Name 12/27/22 0815          Therapy Assessment/Plan (OT)    Planned Therapy Interventions (OT) adaptive equipment training;BADL retraining;edema control/reduction;functional balance retraining;activity tolerance training;cognitive/visual perception retraining;occupation/activity based interventions;neuromuscular control/coordination retraining;IADL retraining;manual therapy/joint mobilization;passive ROM/stretching;patient/caregiver education/training;ROM/therapeutic exercise;transfer/mobility retraining;strengthening exercise  -           User Key  (r) = Recorded By, (t) = Taken By, (c) = Cosigned By    Initials Name Provider Type     Maycol Wells, OT Occupational Therapist               Clinical Impression     Row Name 12/27/22 0815          Pain Assessment    Pretreatment Pain Rating 0/10 - no pain  -SJ     Posttreatment Pain Rating 0/10 - no pain  -     Row Name 12/27/22 0815          Plan of Care Review    Plan of Care Reviewed With patient  -     Outcome Evaluation OT eval complete, co-eval with PT, supine in bed, alert to self and place, cues needed for date and situation. Supine<>sit with supervision. Sit to stand, toilet t/f, and functional mobility with CGA and RW. LE dressing with SBA. LE dressing, toileting, and grooming standing at sink with CGA. With functional mobility, in hallway, patient with x 2 episode of emesis, returned to bed. Patient did report feeling better post emesis episode. All needs in reach. Patient with decreased balance, shuffling gait, low vision, decreased independence in ADLs, and decreased safety in transfers. Cont inpatient OT. Recommend home with home health OT.  -     Row Name 12/27/22 0815          Therapy Assessment/Plan (OT)    Patient/Family Therapy Goal Statement (OT) return home  -     Rehab Potential (OT) good, to achieve stated therapy goals  -     Criteria for Skilled Therapeutic Interventions Met (OT) yes;skilled treatment is necessary  -      Therapy Frequency (OT) other (see comments)  3-7 d/wk (pending MWF dialysis)  -     Predicted Duration of Therapy Intervention (OT) until d/c or all goals met  -     Row Name 12/27/22 0815          Therapy Plan Review/Discharge Plan (OT)    Anticipated Discharge Disposition (OT) home with 24/7 care;home with home health  -     Row Name 12/27/22 0815          Vital Signs    Pre Systolic BP Rehab 144  -SJ     Pre Treatment Diastolic BP 67  -SJ     Post Systolic BP Rehab 154  -SJ     Post Treatment Diastolic BP 68  -SJ     Pretreatment Heart Rate (beats/min) 92  -SJ     Posttreatment Heart Rate (beats/min) 2  -SJ     Pre SpO2 (%) 93  -SJ     O2 Delivery Pre Treatment room air  -SJ     Post SpO2 (%) 93  -SJ     O2 Delivery Post Treatment room air  -SJ     Pre Patient Position Supine  -SJ     Post Patient Position Supine  -     Row Name 12/27/22 0815          Positioning and Restraints    Pre-Treatment Position in bed  -SJ     Post Treatment Position bed  -SJ     In Bed notified nsg;supine;call light within reach;encouraged to call for assist;exit alarm on;side rails up x2  -SJ           User Key  (r) = Recorded By, (t) = Taken By, (c) = Cosigned By    Initials Name Provider Type     Maycol Wells, OT Occupational Therapist               Outcome Measures     Row Name 12/27/22 0815          How much help from another is currently needed...    Putting on and taking off regular lower body clothing? 3  -SJ     Bathing (including washing, rinsing, and drying) 3  -SJ     Toileting (which includes using toilet bed pan or urinal) 3  -SJ     Putting on and taking off regular upper body clothing 4  -SJ     Taking care of personal grooming (such as brushing teeth) 3  -SJ     Eating meals 4  -SJ     AM-PAC 6 Clicks Score (OT) 20  -SJ     Row Name 12/27/22 0816 12/26/22 9143       How much help from another person do you currently need...    Turning from your back to your side while in flat bed without using bedrails? 3   -CZ 3  -SB    Moving from lying on back to sitting on the side of a flat bed without bedrails? 3  -CZ 2  -SB    Moving to and from a bed to a chair (including a wheelchair)? 3  -CZ 2  -SB    Standing up from a chair using your arms (e.g., wheelchair, bedside chair)? 3  -CZ 2  -SB    Climbing 3-5 steps with a railing? 3  -CZ 1  -SB    To walk in hospital room? 3  -CZ 1  -SB    AM-PAC 6 Clicks Score (PT) 18  -CZ 11  -SB    Highest level of mobility 6 --> Walked 10 steps or more  -CZ 4 --> Transferred to chair/commode  -SB    Row Name 12/27/22 0816 12/27/22 0815       Functional Assessment    Outcome Measure Options AM-PAC 6 Clicks Basic Mobility (PT)  -CZ AM-PAC 6 Clicks Daily Activity (OT)  -          User Key  (r) = Recorded By, (t) = Taken By, (c) = Cosigned By    Initials Name Provider Type    CZ John Paul Thakur, PT Physical Therapist     Maycol Wells, OT Occupational Therapist    Angela Galvez, RN Registered Nurse                Occupational Therapy Education     Title: PT OT SLP Therapies (In Progress)     Topic: Occupational Therapy (In Progress)     Point: ADL training (Done)     Description:   Instruct learner(s) on proper safety adaptation and remediation techniques during self care or transfers.   Instruct in proper use of assistive devices.              Learning Progress Summary           Patient Acceptance, E,TB, VU,NR by  at 12/27/2022 0908    Comment: POC, role of OT                   Point: Home exercise program (Not Started)     Description:   Instruct learner(s) on appropriate technique for monitoring, assisting and/or progressing therapeutic exercises/activities.              Learner Progress:  Not documented in this visit.          Point: Precautions (Not Started)     Description:   Instruct learner(s) on prescribed precautions during self-care and functional transfers.              Learner Progress:  Not documented in this visit.          Point: Body mechanics (Not Started)      Description:   Instruct learner(s) on proper positioning and spine alignment during self-care, functional mobility activities and/or exercises.              Learner Progress:  Not documented in this visit.                      User Key     Initials Effective Dates Name Provider Type Discipline     06/14/21 -  Maycol Wells, OT Occupational Therapist OT              OT Recommendation and Plan  Planned Therapy Interventions (OT): adaptive equipment training, BADL retraining, edema control/reduction, functional balance retraining, activity tolerance training, cognitive/visual perception retraining, occupation/activity based interventions, neuromuscular control/coordination retraining, IADL retraining, manual therapy/joint mobilization, passive ROM/stretching, patient/caregiver education/training, ROM/therapeutic exercise, transfer/mobility retraining, strengthening exercise  Therapy Frequency (OT): other (see comments) (3-7 d/wk (pending MWF dialysis))  Plan of Care Review  Plan of Care Reviewed With: patient  Outcome Evaluation: OT eval complete, co-eval with PT, supine in bed, alert to self and place, cues needed for date and situation. Supine<>sit with supervision. Sit to stand, toilet t/f, and functional mobility with CGA and RW. LE dressing with SBA. LE dressing, toileting, and grooming standing at sink with CGA. With functional mobility, in hallway, patient with x 2 episode of emesis, returned to bed. Patient did report feeling better post emesis episode. All needs in reach. Patient with decreased balance, shuffling gait, low vision, decreased independence in ADLs, and decreased safety in transfers. Cont inpatient OT. Recommend home with home health OT.     Time Calculation:    Time Calculation- OT     Row Name 12/27/22 0910             Time Calculation- OT    OT Start Time 0815  -      OT Stop Time 0910  -      OT Time Calculation (min) 55 min  -      OT Received On 12/27/22  -      OT Goal Re-Cert  Due Date 01/09/23  -         Untimed Charges    OT Eval/Re-eval Minutes 55  -SJ         Total Minutes    Untimed Charges Total Minutes 55  -SJ       Total Minutes 55  -SJ            User Key  (r) = Recorded By, (t) = Taken By, (c) = Cosigned By    Initials Name Provider Type    Maycol Blum OT Occupational Therapist              Therapy Charges for Today     Code Description Service Date Service Provider Modifiers Qty    88512166666 HC OT EVAL LOW COMPLEXITY 4 12/27/2022 Maycol Wells OT GO 1               Maycol Wells OT  12/27/2022

## 2022-12-28 LAB
ANION GAP SERPL CALCULATED.3IONS-SCNC: 14 MMOL/L (ref 5–15)
BASOPHILS # BLD AUTO: 0.04 10*3/MM3 (ref 0–0.2)
BASOPHILS NFR BLD AUTO: 0.3 % (ref 0–1.5)
BUN SERPL-MCNC: 60 MG/DL (ref 8–23)
BUN/CREAT SERPL: 7.8 (ref 7–25)
CALCIUM SPEC-SCNC: 9.8 MG/DL (ref 8.6–10.5)
CHLORIDE SERPL-SCNC: 96 MMOL/L (ref 98–107)
CO2 SERPL-SCNC: 28 MMOL/L (ref 22–29)
CREAT SERPL-MCNC: 7.71 MG/DL (ref 0.76–1.27)
DEPRECATED RDW RBC AUTO: 52.6 FL (ref 37–54)
EGFRCR SERPLBLD CKD-EPI 2021: 7.1 ML/MIN/1.73
EOSINOPHIL # BLD AUTO: 0.15 10*3/MM3 (ref 0–0.4)
EOSINOPHIL NFR BLD AUTO: 1 % (ref 0.3–6.2)
ERYTHROCYTE [DISTWIDTH] IN BLOOD BY AUTOMATED COUNT: 16.1 % (ref 12.3–15.4)
GLUCOSE BLDC GLUCOMTR-MCNC: 173 MG/DL (ref 70–130)
GLUCOSE BLDC GLUCOMTR-MCNC: 190 MG/DL (ref 70–130)
GLUCOSE BLDC GLUCOMTR-MCNC: 277 MG/DL (ref 70–130)
GLUCOSE BLDC GLUCOMTR-MCNC: 285 MG/DL (ref 70–130)
GLUCOSE SERPL-MCNC: 202 MG/DL (ref 65–99)
HCT VFR BLD AUTO: 34.3 % (ref 37.5–51)
HGB BLD-MCNC: 10.9 G/DL (ref 13–17.7)
IMM GRANULOCYTES # BLD AUTO: 0.06 10*3/MM3 (ref 0–0.05)
IMM GRANULOCYTES NFR BLD AUTO: 0.4 % (ref 0–0.5)
LYMPHOCYTES # BLD AUTO: 2.35 10*3/MM3 (ref 0.7–3.1)
LYMPHOCYTES NFR BLD AUTO: 15.7 % (ref 19.6–45.3)
MAGNESIUM SERPL-MCNC: 2 MG/DL (ref 1.6–2.4)
MCH RBC QN AUTO: 28.3 PG (ref 26.6–33)
MCHC RBC AUTO-ENTMCNC: 31.8 G/DL (ref 31.5–35.7)
MCV RBC AUTO: 89.1 FL (ref 79–97)
MONOCYTES # BLD AUTO: 1.76 10*3/MM3 (ref 0.1–0.9)
MONOCYTES NFR BLD AUTO: 11.7 % (ref 5–12)
NEUTROPHILS NFR BLD AUTO: 10.64 10*3/MM3 (ref 1.7–7)
NEUTROPHILS NFR BLD AUTO: 70.9 % (ref 42.7–76)
NRBC BLD AUTO-RTO: 0 /100 WBC (ref 0–0.2)
PLATELET # BLD AUTO: 171 10*3/MM3 (ref 140–450)
PMV BLD AUTO: 9.6 FL (ref 6–12)
POTASSIUM SERPL-SCNC: 4.5 MMOL/L (ref 3.5–5.2)
RBC # BLD AUTO: 3.85 10*6/MM3 (ref 4.14–5.8)
SODIUM SERPL-SCNC: 138 MMOL/L (ref 136–145)
WBC NRBC COR # BLD: 15 10*3/MM3 (ref 3.4–10.8)

## 2022-12-28 PROCEDURE — 25010000002 HEPARIN (PORCINE) PER 1000 UNITS: Performed by: INTERNAL MEDICINE

## 2022-12-28 PROCEDURE — 63710000001 INSULIN ASPART PER 5 UNITS: Performed by: INTERNAL MEDICINE

## 2022-12-28 PROCEDURE — 85025 COMPLETE CBC W/AUTO DIFF WBC: CPT | Performed by: INTERNAL MEDICINE

## 2022-12-28 PROCEDURE — G0378 HOSPITAL OBSERVATION PER HR: HCPCS

## 2022-12-28 PROCEDURE — 83735 ASSAY OF MAGNESIUM: CPT | Performed by: INTERNAL MEDICINE

## 2022-12-28 PROCEDURE — 96372 THER/PROPH/DIAG INJ SC/IM: CPT

## 2022-12-28 PROCEDURE — 82962 GLUCOSE BLOOD TEST: CPT

## 2022-12-28 PROCEDURE — G0257 UNSCHED DIALYSIS ESRD PT HOS: HCPCS

## 2022-12-28 PROCEDURE — 97530 THERAPEUTIC ACTIVITIES: CPT

## 2022-12-28 PROCEDURE — 63710000001 INSULIN DETEMIR PER 5 UNITS

## 2022-12-28 PROCEDURE — 80048 BASIC METABOLIC PNL TOTAL CA: CPT | Performed by: INTERNAL MEDICINE

## 2022-12-28 RX ORDER — LANOLIN ALCOHOL/MO/W.PET/CERES
6 CREAM (GRAM) TOPICAL NIGHTLY
Status: DISCONTINUED | OUTPATIENT
Start: 2022-12-28 | End: 2022-12-29 | Stop reason: HOSPADM

## 2022-12-28 RX ADMIN — ASPIRIN 81 MG: 81 TABLET, CHEWABLE ORAL at 08:15

## 2022-12-28 RX ADMIN — Medication 10 ML: at 08:16

## 2022-12-28 RX ADMIN — Medication 6 MG: at 21:03

## 2022-12-28 RX ADMIN — HEPARIN SODIUM 5000 UNITS: 5000 INJECTION INTRAVENOUS; SUBCUTANEOUS at 08:15

## 2022-12-28 RX ADMIN — METOCLOPRAMIDE 5 MG: 5 TABLET ORAL at 19:25

## 2022-12-28 RX ADMIN — HEPARIN SODIUM 5000 UNITS: 5000 INJECTION INTRAVENOUS; SUBCUTANEOUS at 21:04

## 2022-12-28 RX ADMIN — METOCLOPRAMIDE 5 MG: 5 TABLET ORAL at 08:15

## 2022-12-28 RX ADMIN — INSULIN ASPART 4 UNITS: 100 INJECTION, SOLUTION INTRAVENOUS; SUBCUTANEOUS at 11:39

## 2022-12-28 RX ADMIN — INSULIN DETEMIR 5 UNITS: 100 INJECTION, SOLUTION SUBCUTANEOUS at 21:03

## 2022-12-28 RX ADMIN — METOPROLOL SUCCINATE 50 MG: 50 TABLET, EXTENDED RELEASE ORAL at 08:15

## 2022-12-28 RX ADMIN — METOCLOPRAMIDE 5 MG: 5 TABLET ORAL at 11:39

## 2022-12-28 RX ADMIN — INSULIN ASPART 2 UNITS: 100 INJECTION, SOLUTION INTRAVENOUS; SUBCUTANEOUS at 08:16

## 2022-12-28 RX ADMIN — INSULIN ASPART 2 UNITS: 100 INJECTION, SOLUTION INTRAVENOUS; SUBCUTANEOUS at 19:25

## 2022-12-28 NOTE — PLAN OF CARE
Goal Outcome Evaluation:  Plan of Care Reviewed With: patient        Progress: improving  Outcome Evaluation: Patient worked well with therapy today, walked out in hallway, he did get up to toilet and had a bm today. He got up in chair for a while, he has been eating good and feeling good. He is currently down in dialysis at this time.

## 2022-12-28 NOTE — PROGRESS NOTES
[facility] Medicine Services  INPATIENT PROGRESS NOTE    Length of Stay: 1  Date of Admission: 12/26/2022  Primary Care Physician: Provider, Carissa Known    Subjective   (S) mild leukocytosis today; talked to dialysis center and will have HD this afternoon; asking for his melatonin later at night     Review of Systems   All other systems reviewed and are negative.       All pertinent negatives and positives are as above. All other systems have been reviewed and are negative unless otherwise stated.     Prior to Admission medications    Medication Sig Start Date End Date Taking? Authorizing Provider   acetaminophen (TYLENOL) 500 MG tablet Take 1,000 mg by mouth Daily.   Yes Dwaine Valentin MD   aspirin 81 MG chewable tablet Chew 81 mg Daily.   Yes Dwaine Valentin MD   atorvastatin (LIPITOR) 80 MG tablet Take 80 mg by mouth Daily.   Yes Dwaine Valentin MD   Ferric Citrate (AURYXIA PO) Take 210 mg by mouth 3 (Three) Times a Day.   Yes Dwaine Valentin MD   insulin aspart protamine-insulin aspart (novoLOG 70/30) injection Inject 15 Units under the skin into the appropriate area as directed 2 (Two) Times a Day With Meals.   Yes Dwaine Valentin MD   Melatonin 10 MG capsule Take 30 mg by mouth Every Night.   Yes Dwaine Valentin MD   metoclopramide (REGLAN) 5 MG tablet Take 5 mg by mouth 4 (Four) Times a Day.   Yes Dwaine Valentin MD   metoprolol succinate XL (TOPROL-XL) 50 MG 24 hr tablet Take 50 mg by mouth Daily.   Yes Dwaine Valentin MD   midodrine (PROAMATINE) 10 MG tablet Take 10 mg by mouth 3 (Three) Times a Week. 30 minutes before dialysis   Yes Dwaine Valentin MD   SEVELAMER HCL PO Take 800 mg by mouth Daily With Dinner.   Yes Dwaine Valentin MD   vitamin D (ERGOCALCIFEROL) 1.25 MG (92036 UT) capsule capsule Take 50,000 Units by mouth Every 7 (Seven) Days. 12/28/22  Yes Dwaine Valentin MD   multivitamin with  minerals tablet tablet Take 1 tablet by mouth Daily.    Provider, Dwaine, MD   simvastatin (ZOCOR) 80 MG tablet Take 80 mg by mouth Daily.    Provider, Dwaine, MD       aspirin, 81 mg, Oral, Daily  atorvastatin, 40 mg, Oral, Nightly  heparin (porcine), 5,000 Units, Subcutaneous, Q12H  Insulin Aspart, 0-7 Units, Subcutaneous, TID AC  insulin detemir, 5 Units, Subcutaneous, Nightly  metoclopramide, 5 mg, Oral, 4x Daily  metoprolol succinate XL, 50 mg, Oral, Daily  sodium chloride, 10 mL, Intravenous, Q12H           Objective    Temp:  [97.2 °F (36.2 °C)-98 °F (36.7 °C)] 97.4 °F (36.3 °C)  Heart Rate:  [] 109  Resp:  [18-20] 20  BP: (148-170)/(68-72) 170/68    Physical Exam  Constitutional:       Comments: As today 12/28/22   HENT:      Head: Normocephalic.      Nose: Nose normal.   Eyes:      Extraocular Movements: Extraocular movements intact.   Cardiovascular:      Rate and Rhythm: Regular rhythm.      Heart sounds: Normal heart sounds.   Pulmonary:      Effort: Pulmonary effort is normal.      Breath sounds: Normal breath sounds.   Abdominal:      General: Bowel sounds are normal.   Musculoskeletal:         General: Normal range of motion.      Cervical back: Neck supple.   Skin:     General: Skin is warm.   Neurological:      General: No focal deficit present.      Mental Status: He is alert. Mental status is at baseline.      Comments: He has resting tremor; walked with small steps; and stooped posture   Psychiatric:         Behavior: Behavior normal.         Results Review:  I have reviewed the labs, radiology results, and diagnostic studies.    Laboratory Data:   Results from last 7 days   Lab Units 12/28/22  0529 12/27/22  0607 12/26/22  1855   SODIUM mmol/L 138 139 138   POTASSIUM mmol/L 4.5 4.7 4.6   CHLORIDE mmol/L 96* 96* 94*   CO2 mmol/L 28.0 30.0* 29.0   BUN mg/dL 60* 49* 44*   CREATININE mg/dL 7.71* 5.90* 5.72*   GLUCOSE mg/dL 202* 198* 230*   CALCIUM mg/dL 9.8 9.5 9.5   BILIRUBIN mg/dL   --   --  0.4   ALK PHOS U/L  --   --  138*   ALT (SGPT) U/L  --   --  20   AST (SGOT) U/L  --   --  19   ANION GAP mmol/L 14.0 13.0 15.0     CrCl cannot be calculated (Unknown ideal weight.).  Results from last 7 days   Lab Units 12/28/22  0529 12/27/22  0607   MAGNESIUM mg/dL 2.0 1.9         Results from last 7 days   Lab Units 12/28/22  0529 12/27/22  0607 12/26/22  1855   WBC 10*3/mm3 15.00* 8.41 11.69*   HEMOGLOBIN g/dL 10.9* 10.5* 10.8*   HEMATOCRIT % 34.3* 33.9* 34.2*   PLATELETS 10*3/mm3 171 152 143           Culture Data:   Blood Culture   Date Value Ref Range Status   12/26/2022 No growth at 24 hours  Preliminary   12/26/2022 No growth at 24 hours  Preliminary     No results found for: URINECX  No results found for: RESPCX  No results found for: WOUNDCX  No results found for: STOOLCX  No components found for: BODYFLD    Radiology Data:   Imaging Results (Last 24 Hours)     ** No results found for the last 24 hours. **          I have reviewed the patient's current medications.     Assessment/Plan     Syncope/confusion     resolved; he is back to his baseline; he participated with PT and OT     PT and OT working with him     ESRD     Continue with hemodialysis as nephrologist will schedule it      According to his daughter, he does not get confused when the AVF gets use but when he uses the perm cath he does not get confused; he has a history of non fixable of neck stenosis, from [place]     Leukocytosis     Uncertain etiology; no fever, no nausea, no vomit;      Chronic medical problems     Type II DM     Essential hypertension    It looks like he has Parkinson disease; discussed with his daughter; he would need a follow up with a neurologist    Disposition with   PT after dialysis    Clifton Davis MD

## 2022-12-28 NOTE — PROGRESS NOTES
[facility]  [address] [place] [address]. [address]   - [phone]  F - [phone]     Progress Note          PATIENT  DEMOGRAPHICS   PATIENT NAME: Austin Egan                      PHYSICIAN: Dewey Jessica MD  : 1955  MRN: [id number]   LOS: 1 day    Patient Care Team:  Provider, No Known as PCP - General  Subjective   SUBJECTIVE   No acute events          Objective   OBJECTIVE   Vital Signs  Temp:  [97.2 °F (36.2 °C)-98 °F (36.7 °C)] 97.4 °F (36.3 °C)  Heart Rate:  [] 109  Resp:  [18-20] 20  BP: (148-170)/(68-72) 170/68         I/O last 3 completed shifts:  In: 840 [P.O.:840]  Out: 200 [Urine:200]    PHYSICAL EXAM    Physical Exam  Vitals reviewed.   Constitutional:       Appearance: Normal appearance.   HENT:      Head: Normocephalic.      Right Ear: Tympanic membrane normal.      Nose: Nose normal.      Mouth/Throat:      Mouth: Mucous membranes are moist.   Eyes:      Conjunctiva/sclera: Conjunctivae normal.      Pupils: Pupils are equal, round, and reactive to light.   Cardiovascular:      Rate and Rhythm: Normal rate.   Pulmonary:      Effort: Pulmonary effort is normal.   Abdominal:      General: Abdomen is flat. Bowel sounds are normal.   Musculoskeletal:         General: Normal range of motion.      Cervical back: Normal range of motion.   Skin:     General: Skin is warm.   Neurological:      General: No focal deficit present.      Mental Status: He is alert.   Psychiatric:         Mood and Affect: Mood normal.         RESULTS   Results Review:    Results from last 7 days   Lab Units 22  0529 22  0607 22  1855   SODIUM mmol/L 138 139 138   POTASSIUM mmol/L 4.5 4.7 4.6   CHLORIDE mmol/L 96* 96* 94*   CO2 mmol/L 28.0 30.0* 29.0   BUN mg/dL 60* 49* 44*   CREATININE mg/dL 7.71* 5.90* 5.72*   CALCIUM mg/dL 9.8 9.5 9.5   BILIRUBIN mg/dL  --   --  0.4   ALK PHOS U/L  --   --  138*   ALT (SGPT) U/L  --   --  20   AST (SGOT) U/L  --   --  19    GLUCOSE mg/dL 202* 198* 230*       CrCl cannot be calculated (Unknown ideal weight.).    Results from last 7 days   Lab Units 12/28/22  0529 12/27/22  0607   MAGNESIUM mg/dL 2.0 1.9             Results from last 7 days   Lab Units 12/28/22  0529 12/27/22  0607 12/26/22  1855   WBC 10*3/mm3 15.00* 8.41 11.69*   HEMOGLOBIN g/dL 10.9* 10.5* 10.8*   PLATELETS 10*3/mm3 171 152 143               Imaging Results (Last 24 Hours)     ** No results found for the last 24 hours. **           MEDICATIONS    aspirin, 81 mg, Oral, Daily  atorvastatin, 40 mg, Oral, Nightly  heparin (porcine), 5,000 Units, Subcutaneous, Q12H  Insulin Aspart, 0-7 Units, Subcutaneous, TID AC  insulin detemir, 5 Units, Subcutaneous, Nightly  metoclopramide, 5 mg, Oral, 4x Daily  metoprolol succinate XL, 50 mg, Oral, Daily  sodium chloride, 10 mL, Intravenous, Q12H           Assessment & Plan   ASSESSMENT / PLAN      Confusion and disorientation    1. ESRD on hd   HD MWF  Plan for HD 12/28/22 3.5 hours 3 k bath 2 liter UF     2. HTN  Modulate through hd   continue home medications    3. Anemia     4. HLD     5. Hyperkalemia   Modulate through hd              This document has been electronically signed by Dewey Jessica MD on December 28, 2022 11:55 CST

## 2022-12-28 NOTE — PLAN OF CARE
Goal Outcome Evaluation:  Plan of Care Reviewed With: patient        Progress: improving  Outcome Evaluation: Pt was supine in bed when [facility] arrived. Pt has flat affect. Pt completed sup > sit EOB w/ Supervision, sit > stand t/f w/ CGA and perf fxnl mob ~80 ft using RW w/ CGA for safety. Pt completed LBD; donning pants w/ Min A while sitting/standing unsupported. Pt perf t/f to recliner w/ CGA using RW. Pt gave good effort with R UE ther ex using 2 lb weight in all planes, 20 x 1 set while seated supported. Pts L UE restricted. Pt seated in recliner w/ all needs met, awaiting transport to dialysis. Cont OT tx

## 2022-12-28 NOTE — THERAPY TREATMENT NOTE
Patient Name: Austin Egan  : 1955    MRN: [id number]                              Today's Date: 2022       Admit Date: 2022    Visit Dx:     ICD-10-CM ICD-9-CM   1. Impaired functional mobility, balance, gait, and endurance  Z74.09 V49.89   2. Impaired mobility and ADLs  Z74.09 V49.89    Z78.9      Patient Active Problem List   Diagnosis   • Confusion and disorientation     Past Medical History:   Diagnosis Date   • Atrial fibrillation (HCC)    • Blindness    • Diabetes mellitus (HCC)    • Elevated cholesterol    • Hypertension    • Kidney disease    • Renal failure      History reviewed. No pertinent surgical history.   General Information     Row Name 22 1121          OT Time and Intention    Document Type therapy note (daily note)  -CS     Mode of Treatment occupational therapy;individual therapy  -CS     Row Name 22 112          General Information    Patient Profile Reviewed yes  -CS     Existing Precautions/Restrictions fall  -CS     Row Name 22 112          Cognition    Orientation Status (Cognition) oriented to;person;place;verbal cues/prompts needed for orientation;time  -CS     Row Name 22 112          Safety Issues, Functional Mobility    Impairments Affecting Function (Mobility) strength;endurance/activity tolerance;balance  -CS           User Key  (r) = Recorded By, (t) = Taken By, (c) = Cosigned By    Initials Name Provider Type    CS Meena Mendosa COTA Occupational Therapist Assistant                 Mobility/ADL's     Row Name 22 112          Bed Mobility    Bed Mobility supine-sit;sit-supine  -CS     Supine-Sit [place] (Bed Mobility) supervision  -CS     Assistive Device (Bed Mobility) head of bed elevated  -CS     Row Name 22 112          Transfers    Transfers sit-stand transfer;bed-chair transfer  -CS     Row Name 22 112          Bed-Chair Transfer    Bed-Chair [place] (Transfers) contact guard  -CS      Assistive Device (Bed-Chair Transfers) walker, front-wheeled  -CS     Row Name 12/28/22 1121          Sit-Stand Transfer    Sit-Stand [place] (Transfers) contact guard  -     Assistive Device (Sit-Stand Transfers) walker, front-wheeled  -CS     Row Name 12/28/22 1121          Functional Mobility    Functional Mobility- Ind. Level contact guard assist  -CS     Functional Mobility- Device walker, front-wheeled  -CS     Functional Mobility-Distance (Feet) 80  -     Row Name 12/28/22 1121          Lower Body Dressing Assessment/Training    [place] Level (Lower Body Dressing) don;pants/bottoms;minimum assist (75% patient effort)  -     Position (Lower Body Dressing) edge of bed sitting;unsupported standing  -           User Key  (r) = Recorded By, (t) = Taken By, (c) = Cosigned By    Initials Name Provider Type    Meena Butts COTA Occupational Therapist Assistant               Obj/Interventions     Row Name 12/28/22 1121          Shoulder (Therapeutic Exercise)    Shoulder (Therapeutic Exercise) strengthening exercise  -     Shoulder Strengthening (Therapeutic Exercise) right;flexion;aBduction;aDduction;resisted diagonal exercise;2 lb free weight;sitting;20 repititions  -     Row Name 12/28/22 1121          Elbow/Forearm (Therapeutic Exercise)    Elbow/Forearm (Therapeutic Exercise) strengthening exercise  -     Elbow/Forearm Strengthening (Therapeutic Exercise) right;flexion;extension;supination;pronation;sitting;2 lb free weight;20 repititions  -     Row Name 12/28/22 1121          Motor Skills    Therapeutic Exercise shoulder;elbow/forearm  -           User Key  (r) = Recorded By, (t) = Taken By, (c) = Cosigned By    Initials Name Provider Type    Meena Butts COTA Occupational Therapist Assistant               Goals/Plan     Row Name 12/28/22 1121          Transfer Goal 1 (OT)    Activity/Assistive Device (Transfer Goal 1, OT) toilet  -     [place] Level/Cues Needed  (Transfer Goal 1, OT) modified independence  -CS     Time Frame (Transfer Goal 1, OT) long term goal (LTG);by discharge  -CS     Progress/Outcome (Transfer Goal 1, OT) goal not met  -CS     Row Name 12/28/22 1121          Bathing Goal 1 (OT)    Activity/Device (Bathing Goal 1, OT) lower body bathing  -CS     [place] Level/Cues Needed (Bathing Goal 1, OT) modified independence  -CS     Time Frame (Bathing Goal 1, OT) long term goal (LTG);by discharge  -CS     Progress/Outcomes (Bathing Goal 1, OT) goal not met  -CS     Row Name 12/28/22 1121          Toileting Goal 1 (OT)    Activity/Device (Toileting Goal 1, OT) toileting skills, all  -CS     [place] Level/Cues Needed (Toileting Goal 1, OT) independent  -CS     Time Frame (Toileting Goal 1, OT) long term goal (LTG);by discharge  -CS     Progress/Outcome (Toileting Goal 1, OT) goal not met  -CS           User Key  (r) = Recorded By, (t) = Taken By, (c) = Cosigned By    Initials Name Provider Type    CS Meena Mendosa COTA Occupational Therapist Assistant               Clinical Impression     Row Name 12/28/22 1121          Pain Assessment    Pretreatment Pain Rating 0/10 - no pain  -CS     Posttreatment Pain Rating 0/10 - no pain  -CS     Row Name 12/28/22 1121          Plan of Care Review    Plan of Care Reviewed With patient  -CS     Progress improving  -CS     Outcome Evaluation Pt was supine in bed when [facility] arrived. Pt has flat affect. Pt completed sup > sit EOB w/ Supervision, sit > stand t/f w/ CGA and perf fxnl mob ~80 ft using RW w/ CGA for safety. Pt completed LBD; donning pants w/ Min A while sitting/standing unsupported. Pt perf t/f to recliner w/ CGA using RW. Pt gave good effort with R UE ther ex using 2 lb weight in all planes, 20 x 1 set while seated supported. Pts L UE restricted. Pt seated in recliner w/ all needs met, awaiting transport to dialysis. Cont OT tx  -CS     Row Name 12/28/22 1121          Therapy Assessment/Plan (OT)     Rehab Potential (OT) good, to achieve stated therapy goals  -CS     Criteria for Skilled Therapeutic Interventions Met (OT) yes;skilled treatment is necessary  -CS     Therapy Frequency (OT) other (see comments)  3-7 days a week  -CS     Row Name 12/28/22 1121          Therapy Plan Review/Discharge Plan (OT)    Anticipated Discharge Disposition (OT) home with 24/7 care;home with home health  -CS     Row Name 12/28/22 1121          Vital Signs    Post Systolic BP Rehab 156  -CS     Post Treatment Diastolic BP 74  -CS     Intratreatment Heart Rate (beats/min) 101  -CS     Posttreatment Heart Rate (beats/min) 92  -CS     Post SpO2 (%) 97  -CS     O2 Delivery Post Treatment room air  -CS     Pre Patient Position Supine  -CS     Intra Patient Position Standing  -CS     Post Patient Position Sitting  -CS     Row Name 12/28/22 1121          Positioning and Restraints    Pre-Treatment Position in bed  -CS     Post Treatment Position chair  -CS     In Chair notified nsg;sitting;call light within reach;encouraged to call for assist;exit alarm on  -CS           User Key  (r) = Recorded By, (t) = Taken By, (c) = Cosigned By    Initials Name Provider Type    CS Meena Mendosa COTA Occupational Therapist Assistant               Outcome Measures     Row Name 12/28/22 1121          How much help from another is currently needed...    Putting on and taking off regular lower body clothing? 3  -CS     Bathing (including washing, rinsing, and drying) 3  -CS     Toileting (which includes using toilet bed pan or urinal) 3  -CS     Putting on and taking off regular upper body clothing 4  -CS     Taking care of personal grooming (such as brushing teeth) 3  -CS     Eating meals 4  -CS     AM-PAC 6 Clicks Score (OT) 20  -CS     Row Name 12/28/22 0915          How much help from another person do you currently need...    Turning from your back to your side while in flat bed without using bedrails? 3  -MC     Moving from lying on back to  sitting on the side of a flat bed without bedrails? 3  -MC     Moving to and from a bed to a chair (including a wheelchair)? 3  -MC     Standing up from a chair using your arms (e.g., wheelchair, bedside chair)? 3  -MC     Climbing 3-5 steps with a railing? 3  -MC     To walk in hospital room? 3  -MC     AM-PAC 6 Clicks Score (PT) 18  -MC     Highest level of mobility 6 --> Walked 10 steps or more  -           User Key  (r) = Recorded By, (t) = Taken By, (c) = Cosigned By    Initials Name Provider Type    MC Jessica Posey RN Registered Nurse    Meena Butts COTA Occupational Therapist Assistant                Occupational Therapy Education     Title: PT OT SLP Therapies (In Progress)     Topic: Occupational Therapy (In Progress)     Point: ADL training (Done)     Description:   Instruct learner(s) on proper safety adaptation and remediation techniques during self care or transfers.   Instruct in proper use of assistive devices.              Learning Progress Summary           Patient Acceptance, E,TB, VU,NR by  at 12/27/2022 0908    Comment: POC, role of OT                   Point: Home exercise program (Not Started)     Description:   Instruct learner(s) on appropriate technique for monitoring, assisting and/or progressing therapeutic exercises/activities.              Learner Progress:  Not documented in this visit.          Point: Precautions (Not Started)     Description:   Instruct learner(s) on prescribed precautions during self-care and functional transfers.              Learner Progress:  Not documented in this visit.          Point: Body mechanics (Not Started)     Description:   Instruct learner(s) on proper positioning and spine alignment during self-care, functional mobility activities and/or exercises.              Learner Progress:  Not documented in this visit.                      User Key     Initials Effective Dates Name Provider Type Discipline     06/14/21 -  Maycol Wells,  OT Occupational Therapist OT              OT Recommendation and Plan  Therapy Frequency (OT): other (see comments) (3-7 days a week)  Plan of Care Review  Plan of Care Reviewed With: patient  Progress: improving  Outcome Evaluation: Pt was supine in bed when [facility] arrived. Pt has flat affect. Pt completed sup > sit EOB w/ Supervision, sit > stand t/f w/ CGA and perf fxnl mob ~80 ft using RW w/ CGA for safety. Pt completed LBD; donning pants w/ Min A while sitting/standing unsupported. Pt perf t/f to recliner w/ CGA using RW. Pt gave good effort with R UE ther ex using 2 lb weight in all planes, 20 x 1 set while seated supported. Pts L UE restricted. Pt seated in recliner w/ all needs met, awaiting transport to dialysis. Cont OT tx     Time Calculation:    Time Calculation- OT     Row Name 12/28/22 1121             Time Calculation- OT    OT Start Time 1121  -CS      OT Stop Time 1159  -CS      OT Time Calculation (min) 38 min  -CS      Total Timed Code Minutes- OT 38 minute(s)  -CS         Timed Charges    00004 - OT Therapeutic Activity Minutes 38  -CS         Total Minutes    Timed Charges Total Minutes 38  -CS       Total Minutes 38  -CS            User Key  (r) = Recorded By, (t) = Taken By, (c) = Cosigned By    Initials Name Provider Type    CS Meena Mendosa COTA Occupational Therapist Assistant              Therapy Charges for Today     Code Description Service Date Service Provider Modifiers Qty    [id number] HC OT THERAPEUTIC ACT EA 15 MIN 12/28/2022 Meena Mendosa COTA GO 3               ETTA Calvert  12/28/2022

## 2022-12-29 VITALS
WEIGHT: 182 LBS | HEART RATE: 100 BPM | SYSTOLIC BLOOD PRESSURE: 146 MMHG | DIASTOLIC BLOOD PRESSURE: 69 MMHG | OXYGEN SATURATION: 94 % | TEMPERATURE: 97.2 F | RESPIRATION RATE: 20 BRPM

## 2022-12-29 LAB
ANION GAP SERPL CALCULATED.3IONS-SCNC: 14 MMOL/L (ref 5–15)
BASOPHILS # BLD AUTO: 0.06 10*3/MM3 (ref 0–0.2)
BASOPHILS NFR BLD AUTO: 0.4 % (ref 0–1.5)
BUN SERPL-MCNC: 39 MG/DL (ref 8–23)
BUN/CREAT SERPL: 6.8 (ref 7–25)
CALCIUM SPEC-SCNC: 9.8 MG/DL (ref 8.6–10.5)
CHLORIDE SERPL-SCNC: 91 MMOL/L (ref 98–107)
CO2 SERPL-SCNC: 30 MMOL/L (ref 22–29)
CREAT SERPL-MCNC: 5.74 MG/DL (ref 0.76–1.27)
DEPRECATED RDW RBC AUTO: 50.2 FL (ref 37–54)
EGFRCR SERPLBLD CKD-EPI 2021: 10.1 ML/MIN/1.73
EOSINOPHIL # BLD AUTO: 0.09 10*3/MM3 (ref 0–0.4)
EOSINOPHIL NFR BLD AUTO: 0.6 % (ref 0.3–6.2)
ERYTHROCYTE [DISTWIDTH] IN BLOOD BY AUTOMATED COUNT: 15.9 % (ref 12.3–15.4)
GLUCOSE BLDC GLUCOMTR-MCNC: 254 MG/DL (ref 70–130)
GLUCOSE SERPL-MCNC: 260 MG/DL (ref 65–99)
HCT VFR BLD AUTO: 34.1 % (ref 37.5–51)
HGB BLD-MCNC: 10.7 G/DL (ref 13–17.7)
IMM GRANULOCYTES # BLD AUTO: 0.1 10*3/MM3 (ref 0–0.05)
IMM GRANULOCYTES NFR BLD AUTO: 0.7 % (ref 0–0.5)
LYMPHOCYTES # BLD AUTO: 2.15 10*3/MM3 (ref 0.7–3.1)
LYMPHOCYTES NFR BLD AUTO: 14.2 % (ref 19.6–45.3)
MAGNESIUM SERPL-MCNC: 1.7 MG/DL (ref 1.6–2.4)
MCH RBC QN AUTO: 27.4 PG (ref 26.6–33)
MCHC RBC AUTO-ENTMCNC: 31.4 G/DL (ref 31.5–35.7)
MCV RBC AUTO: 87.4 FL (ref 79–97)
MONOCYTES # BLD AUTO: 1.9 10*3/MM3 (ref 0.1–0.9)
MONOCYTES NFR BLD AUTO: 12.6 % (ref 5–12)
NEUTROPHILS NFR BLD AUTO: 10.83 10*3/MM3 (ref 1.7–7)
NEUTROPHILS NFR BLD AUTO: 71.5 % (ref 42.7–76)
NRBC BLD AUTO-RTO: 0 /100 WBC (ref 0–0.2)
PLATELET # BLD AUTO: 187 10*3/MM3 (ref 140–450)
PMV BLD AUTO: 10 FL (ref 6–12)
POTASSIUM SERPL-SCNC: 4 MMOL/L (ref 3.5–5.2)
RBC # BLD AUTO: 3.9 10*6/MM3 (ref 4.14–5.8)
SODIUM SERPL-SCNC: 135 MMOL/L (ref 136–145)
WBC NRBC COR # BLD: 15.13 10*3/MM3 (ref 3.4–10.8)

## 2022-12-29 PROCEDURE — 25010000002 HEPARIN (PORCINE) PER 1000 UNITS: Performed by: INTERNAL MEDICINE

## 2022-12-29 PROCEDURE — G0378 HOSPITAL OBSERVATION PER HR: HCPCS

## 2022-12-29 PROCEDURE — 85025 COMPLETE CBC W/AUTO DIFF WBC: CPT | Performed by: INTERNAL MEDICINE

## 2022-12-29 PROCEDURE — 63710000001 INSULIN ASPART PER 5 UNITS: Performed by: INTERNAL MEDICINE

## 2022-12-29 PROCEDURE — 83735 ASSAY OF MAGNESIUM: CPT | Performed by: INTERNAL MEDICINE

## 2022-12-29 PROCEDURE — 80048 BASIC METABOLIC PNL TOTAL CA: CPT | Performed by: INTERNAL MEDICINE

## 2022-12-29 PROCEDURE — 82962 GLUCOSE BLOOD TEST: CPT

## 2022-12-29 PROCEDURE — 96372 THER/PROPH/DIAG INJ SC/IM: CPT

## 2022-12-29 RX ORDER — ATORVASTATIN CALCIUM 40 MG/1
40 TABLET, FILM COATED ORAL DAILY
Qty: 30 TABLET | Refills: 0 | Status: SHIPPED | OUTPATIENT
Start: 2022-12-29 | End: 2023-01-28

## 2022-12-29 RX ADMIN — ASPIRIN 81 MG: 81 TABLET, CHEWABLE ORAL at 08:27

## 2022-12-29 RX ADMIN — METOPROLOL SUCCINATE 50 MG: 50 TABLET, EXTENDED RELEASE ORAL at 08:27

## 2022-12-29 RX ADMIN — Medication 10 ML: at 08:27

## 2022-12-29 RX ADMIN — METOCLOPRAMIDE 5 MG: 5 TABLET ORAL at 08:27

## 2022-12-29 RX ADMIN — INSULIN ASPART 4 UNITS: 100 INJECTION, SOLUTION INTRAVENOUS; SUBCUTANEOUS at 08:27

## 2022-12-29 RX ADMIN — HEPARIN SODIUM 5000 UNITS: 5000 INJECTION INTRAVENOUS; SUBCUTANEOUS at 08:27

## 2022-12-29 NOTE — DISCHARGE SUMMARY
[facility]  DISCHARGE SUMMARY       Date of Admission: 12/26/2022  Date of Discharge:  12/29/2022  Primary Care Physician: Provider, No Known    Presenting Problem/History of Present Illness:  Confusion and disorientation [R41.0]       Final Discharge Diagnoses:  Syncope/confusion     resolved; he is back to his baseline; he participated with PT and OT     PT and OT working with him and it seems that he is back to his baseline     ESRD     Continue with hemodialysis as nephrologist will schedule it      According to his daughter, he does not get confused when the AVF gets use but when he uses the perm cath he does not get confused; he has a history of non fixable of neck stenosis, from [place]      Leukocytosis     Uncertain etiology; no fever, no nausea, no vomit; discussed with her daughter to monitor     Chronic medical problems     Type II DM     Essential hypertension     It looks like he has Parkinson disease; discussed with his daughter; he would need a follow up with a neurologist    Consults:   Consults     Date and Time Order Name Status Description    12/26/2022  6:32 PM Inpatient Nephrology Consult Completed           Procedures Performed:                 Pertinent Test Results:   Lab Results (most recent)     Procedure Component Value Units Date/Time    Basic Metabolic Panel [[id number]]  (Abnormal) Collected: 12/29/22 0611    Specimen: Blood Updated: 12/29/22 0707     Glucose 260 mg/dL      BUN 39 mg/dL      Creatinine 5.74 mg/dL      Sodium 135 mmol/L      Potassium 4.0 mmol/L      Chloride 91 mmol/L      CO2 30.0 mmol/L      Calcium 9.8 mg/dL      BUN/Creatinine Ratio 6.8     Anion Gap 14.0 mmol/L      eGFR 10.1 mL/min/1.73      Comment: <15 Indicative of kidney failure       Narrative:      GFR Normal >60  Chronic Kidney Disease <60  Kidney Failure <15      Magnesium [[id number]]  (Normal) Collected: 12/29/22 0611    Specimen: Blood  Updated: 12/29/22 0707     Magnesium 1.7 mg/dL     CBC & Differential [[id number]]  (Abnormal) Collected: 12/29/22 0611    Specimen: Blood Updated: 12/29/22 0701    Narrative:      The following orders were created for panel order CBC & Differential.  Procedure                               Abnormality         Status                     ---------                               -----------         ------                     CBC Auto Differential[[id number]]        Abnormal            Final result                 Please view results for these tests on the individual orders.    CBC Auto Differential [[id number]]  (Abnormal) Collected: 12/29/22 0611    Specimen: Blood Updated: 12/29/22 0701     WBC 15.13 10*3/mm3      RBC 3.90 10*6/mm3      Hemoglobin 10.7 g/dL      Hematocrit 34.1 %      MCV 87.4 fL      MCH 27.4 pg      MCHC 31.4 g/dL      RDW 15.9 %      RDW-SD 50.2 fl      MPV 10.0 fL      Platelets 187 10*3/mm3      Neutrophil % 71.5 %      Lymphocyte % 14.2 %      Monocyte % 12.6 %      Eosinophil % 0.6 %      Basophil % 0.4 %      Immature Grans % 0.7 %      Neutrophils, Absolute 10.83 10*3/mm3      Lymphocytes, Absolute 2.15 10*3/mm3      Monocytes, Absolute 1.90 10*3/mm3      Eosinophils, Absolute 0.09 10*3/mm3      Basophils, Absolute 0.06 10*3/mm3      Immature Grans, Absolute 0.10 10*3/mm3      nRBC 0.0 /100 WBC     POC Glucose Once [[id number]]  (Abnormal) Collected: 12/28/22 2022    Specimen: Blood Updated: 12/28/22 2041     Glucose 277 mg/dL      Comment: RN NotifiedOperator: [id number] WHITNEY AMANDAMeter ID: [id number]       POC Glucose Once [[id number]]  (Abnormal) Collected: 12/28/22 1923    Specimen: Blood Updated: 12/28/22 1934     Glucose 190 mg/dL      Comment: RN NotifiedOperator: [id number] MANDY MEGANMeter ID: [id number]       Blood Culture - Blood, Arm, Right [[id number]]  (Normal) Collected: 12/26/22 1855    Specimen: Blood from Arm, Right Updated: 12/28/22 1930     Blood Culture No growth at 2  days    Blood Culture - Blood, Arm, Left [[id number]]  (Normal) Collected: 12/26/22 1855    Specimen: Blood from Arm, Left Updated: 12/28/22 1930     Blood Culture No growth at 2 days    Basic Metabolic Panel [[id number]]  (Abnormal) Collected: 12/28/22 0529    Specimen: Blood Updated: 12/28/22 0619     Glucose 202 mg/dL      BUN 60 mg/dL      Creatinine 7.71 mg/dL      Sodium 138 mmol/L      Potassium 4.5 mmol/L      Chloride 96 mmol/L      CO2 28.0 mmol/L      Calcium 9.8 mg/dL      BUN/Creatinine Ratio 7.8     Anion Gap 14.0 mmol/L      eGFR 7.1 mL/min/1.73      Comment: <15 Indicative of kidney failure       Narrative:      GFR Normal >60  Chronic Kidney Disease <60  Kidney Failure <15      Magnesium [[id number]]  (Normal) Collected: 12/28/22 0529    Specimen: Blood Updated: 12/28/22 0619     Magnesium 2.0 mg/dL     CBC & Differential [[id number]]  (Abnormal) Collected: 12/28/22 0529    Specimen: Blood Updated: 12/28/22 0608    Narrative:      The following orders were created for panel order CBC & Differential.  Procedure                               Abnormality         Status                     ---------                               -----------         ------                     CBC Auto Differential[[id number]]        Abnormal            Final result                 Please view results for these tests on the individual orders.    CBC Auto Differential [[id number]]  (Abnormal) Collected: 12/28/22 0529    Specimen: Blood Updated: 12/28/22 0608     WBC 15.00 10*3/mm3      RBC 3.85 10*6/mm3      Hemoglobin 10.9 g/dL      Hematocrit 34.3 %      MCV 89.1 fL      MCH 28.3 pg      MCHC 31.8 g/dL      RDW 16.1 %      RDW-SD 52.6 fl      MPV 9.6 fL      Platelets 171 10*3/mm3      Neutrophil % 70.9 %      Lymphocyte % 15.7 %      Monocyte % 11.7 %      Eosinophil % 1.0 %      Basophil % 0.3 %      Immature Grans % 0.4 %      Neutrophils, Absolute 10.64 10*3/mm3      Lymphocytes, Absolute 2.35 10*3/mm3      Monocytes,  Absolute 1.76 10*3/mm3      Eosinophils, Absolute 0.15 10*3/mm3      Basophils, Absolute 0.04 10*3/mm3      Immature Grans, Absolute 0.06 10*3/mm3      nRBC 0.0 /100 WBC     Hepatitis B Surface Antigen [[id number]]  (Normal) Collected: 12/27/22 1523    Specimen: Blood Updated: 12/27/22 1602     Hepatitis B Surface Ag Non-Reactive    Comprehensive Metabolic Panel [[id number]]  (Abnormal) Collected: 12/26/22 1855    Specimen: Blood Updated: 12/26/22 1956     Glucose 230 mg/dL      BUN 44 mg/dL      Creatinine 5.72 mg/dL      Sodium 138 mmol/L      Potassium 4.6 mmol/L      Comment: Slight hemolysis detected by analyzer. Results may be affected.        Chloride 94 mmol/L      CO2 29.0 mmol/L      Calcium 9.5 mg/dL      Total Protein 7.8 g/dL      Albumin 3.9 g/dL      ALT (SGPT) 20 U/L      AST (SGOT) 19 U/L      Comment: Slight hemolysis detected by analyzer. Results may be affected.        Alkaline Phosphatase 138 U/L      Total Bilirubin 0.4 mg/dL      Globulin 3.9 gm/dL      A/G Ratio 1.0 g/dL      BUN/Creatinine Ratio 7.7     Anion Gap 15.0 mmol/L      eGFR 10.2 mL/min/1.73      Comment: <15 Indicative of kidney failure       Narrative:      GFR Normal >60  Chronic Kidney Disease <60  Kidney Failure <15      Procalcitonin [[id number]]  (Abnormal) Collected: 12/26/22 1855    Specimen: Blood Updated: 12/26/22 1953     Procalcitonin 0.73 ng/mL     Narrative:      As a Marker for Sepsis (Non-Neonates):    1. <0.5 ng/mL represents a low risk of severe sepsis and/or septic shock.  2. >2 ng/mL represents a high risk of severe sepsis and/or septic shock.    As a Marker for Lower Respiratory Tract Infections that require antibiotic therapy:    PCT on Admission    Antibiotic Therapy       6-12 Hrs later    >0.5                Strongly Recommended  >0.25 - <0.5        Recommended   0.1 - 0.25          Discouraged              Remeasure/reassess PCT  <0.1                Strongly Discouraged     Remeasure/reassess PCT    As 28  day mortality risk marker: \"Change in Procalcitonin Result\" (>80% or <=80%) if Day 0 (or Day 1) and Day 4 values are available. Refer to http://www.[facility]-pct-calculator.com    Change in PCT <=80%  A decrease of PCT levels below or equal to 80% defines a positive change in PCT test result representing a higher risk for 28-day all-cause mortality of patients diagnosed with severe sepsis for septic shock.    Change in PCT >80%  A decrease of PCT levels of more than 80% defines a negative change in PCT result representing a lower risk for 28-day all-cause mortality of patients diagnosed with severe sepsis or septic shock.       Lactic Acid, Plasma [[id number]]  (Normal) Collected: 12/26/22 1855    Specimen: Blood Updated: 12/26/22 1932     Lactate 1.3 mmol/L         Imaging Results (Most Recent)     Procedure Component Value Units Date/Time    XR Chest 1 View [[id number]] Collected: 12/26/22 1943     Updated: 12/26/22 2325    Narrative:      XR CHEST 1 VIEW    COMPARISONS: September 30, 2022    ADDITIONAL PERTINENT HISTORY: Syncope    FINDINGS:    Life-support:  Right internal jugular dual lumen catheter with  its tips at the cavoatrial junction.    Cardiomediastinal silhouette:  Moderate cardiomegaly.    Pulmonary vasculature:  Findings of mild pulmonary edema appear    Lung fields:  Negative.    Pleural spaces: Negative.    Osseous structures:  Mild osteoarthritic changes involving the  left acromioclavicular joint.    Surrounding soft tissues:  Negative.        Impression:      1. Findings concerning for mild congestive heart failure    Electronically signed by:  Aníbal Amin MD  12/26/2022 11:23 PM [facility]  Workstation: [id number]          Chief Complaint on Day of Discharge: None    Hospital Course:  The patient is a 67 y.o. male who presented to [facility] with syncope like episode after dialysis; episode that has been repeating for a while according to his daughter Bhargavi. Patient was back to his  baseline in the next 2 days; uncertain etiology what really happened; no events on telemetry; no infectious process has been discovered since admission      Condition on Discharge:  Stable    Physical Exam on Discharge:  /69 (BP Location: Right arm, Patient Position: Sitting)   Pulse 100   Temp 97.2 °F (36.2 °C) (Temporal)   Resp 20   Wt 82.6 kg (182 lb)   SpO2 94%   Physical Exam  Constitutional:       Comments: Chronically ill appearing   HENT:      Head: Normocephalic.      Nose: Nose normal.      Mouth/Throat:      Mouth: Mucous membranes are moist.   Eyes:      Extraocular Movements: Extraocular movements intact.   Cardiovascular:      Rate and Rhythm: Normal rate.      Pulses: Normal pulses.      Heart sounds: Normal heart sounds.   Pulmonary:      Breath sounds: Normal breath sounds.   Abdominal:      General: Bowel sounds are normal.      Palpations: Abdomen is soft.   Musculoskeletal:         General: Normal range of motion.      Cervical back: Neck supple.   Skin:     General: Skin is warm.   Neurological:      General: No focal deficit present.      Mental Status: He is alert. Mental status is at baseline.   Psychiatric:         Behavior: Behavior normal.           Discharge Disposition:  Home-Health Care [facility]    Discharge Medications:     Your medication list      START taking these medications      Instructions Last Dose Given Next Dose Due   atorvastatin 40 MG tablet  Commonly known as: LIPITOR  Replaces: simvastatin 80 MG tablet  Notes to patient: 12/30/2022      Take 1 tablet by mouth Daily for 30 days.          CONTINUE taking these medications      Instructions Last Dose Given Next Dose Due   aspirin 81 MG chewable tablet  Notes to patient: 12/30/2022      Chew 81 mg Daily.       insulin aspart prot-insulin aspart (70-30) 100 UNIT/ML injection  Commonly known as: novoLOG 70/30  Notes to patient: 12/29/2022      Inject 15 Units under the skin into the appropriate area as directed 2 (Two)  Times a Day With Meals.       Melatonin 10 MG capsule  Notes to patient: 12/29/2022      Take 30 mg by mouth Every Night.       metoclopramide 5 MG tablet  Commonly known as: REGLAN  Notes to patient: 12/29/2022      Take 5 mg by mouth 4 (Four) Times a Day.       metoprolol succinate XL 50 MG 24 hr tablet  Commonly known as: TOPROL-XL  Notes to patient: 12/30/2022      Take 50 mg by mouth Daily.       midodrine 10 MG tablet  Commonly known as: PROAMATINE  Notes to patient: As directed      Take 10 mg by mouth 3 (Three) Times a Week. 30 minutes before dialysis       multivitamin with minerals tablet tablet  Notes to patient: 12/30/2022      Take 1 tablet by mouth Daily.       SEVELAMER HCL PO  Notes to patient: 12/29/2022      Take 800 mg by mouth Daily With Dinner.       vitamin D 1.25 MG (22455 UT) capsule capsule  Commonly known as: ERGOCALCIFEROL  Notes to patient: As directed      Take 50,000 Units by mouth Every 7 (Seven) Days.          STOP taking these medications    acetaminophen 500 MG tablet  Commonly known as: TYLENOL        atorvastatin 80 MG tablet  Commonly known as: LIPITOR        AURYXIA PO        simvastatin 80 MG tablet  Commonly known as: ZOCOR  Replaced by: atorvastatin 40 MG tablet              Where to Get Your Medications      These medications were sent to [facility] [address] [phone]  - [phone] [address] [address]    Phone: [phone]   · atorvastatin 40 MG tablet          Discharge Diet:   Diet Instructions     Diet: Renal; Thin      Discharge Diet: Renal    Fluid Consistency: Thin          Activity at Discharge: as tolerated    Discharge Care Plan/Instructions: see chart    Follow-up Appointments:   No future appointments.    Test Results Pending at Discharge:   Pending Labs     Order Current Status    Blood Culture - Blood, Arm, Left Preliminary result    Blood Culture - Blood, Arm, Right Preliminary result          Clifton Davis,  MD    Time: 26 min

## 2022-12-29 NOTE — DISCHARGE INSTR - APPOINTMENTS
Follow up 1 week  with PCP Magy HUFF  [phone]  Thursday January 5th at 4:00 pm.    Follow your dialysis schedule of Monday, Wednesday and Friday.

## 2022-12-31 LAB
BACTERIA SPEC AEROBE CULT: NORMAL
BACTERIA SPEC AEROBE CULT: NORMAL

## 2023-01-02 ENCOUNTER — HOSPITAL ENCOUNTER (OUTPATIENT)
Facility: HOSPITAL | Age: 68
Setting detail: OBSERVATION
Discharge: HOME HEALTH | End: 2023-01-05
Attending: INTERNAL MEDICINE | Admitting: INTERNAL MEDICINE
Payer: MEDICARE

## 2023-01-02 DIAGNOSIS — Z74.09 IMPAIRED FUNCTIONAL MOBILITY, BALANCE, GAIT, AND ENDURANCE: ICD-10-CM

## 2023-01-02 DIAGNOSIS — Z78.9 IMPAIRED MOBILITY AND ADLS: ICD-10-CM

## 2023-01-02 DIAGNOSIS — Z74.09 IMPAIRED MOBILITY AND ADLS: ICD-10-CM

## 2023-01-02 PROBLEM — G93.41 ACUTE METABOLIC ENCEPHALOPATHY: Status: ACTIVE | Noted: 2023-01-02

## 2023-01-02 LAB
ALBUMIN SERPL-MCNC: 3.4 G/DL (ref 3.5–5.2)
ALBUMIN/GLOB SERPL: 0.9 G/DL
ALP SERPL-CCNC: 104 U/L (ref 39–117)
ALT SERPL W P-5'-P-CCNC: 16 U/L (ref 1–41)
ANION GAP SERPL CALCULATED.3IONS-SCNC: 16 MMOL/L (ref 5–15)
AST SERPL-CCNC: 20 U/L (ref 1–40)
BASOPHILS # BLD AUTO: 0.04 10*3/MM3 (ref 0–0.2)
BASOPHILS NFR BLD AUTO: 0.3 % (ref 0–1.5)
BILIRUB SERPL-MCNC: 0.3 MG/DL (ref 0–1.2)
BUN SERPL-MCNC: 40 MG/DL (ref 8–23)
BUN/CREAT SERPL: 6.8 (ref 7–25)
CALCIUM SPEC-SCNC: 9.5 MG/DL (ref 8.6–10.5)
CHLORIDE SERPL-SCNC: 95 MMOL/L (ref 98–107)
CO2 SERPL-SCNC: 27 MMOL/L (ref 22–29)
CREAT SERPL-MCNC: 5.9 MG/DL (ref 0.76–1.27)
DEPRECATED RDW RBC AUTO: 52.4 FL (ref 37–54)
EGFRCR SERPLBLD CKD-EPI 2021: 9.8 ML/MIN/1.73
EOSINOPHIL # BLD AUTO: 0.05 10*3/MM3 (ref 0–0.4)
EOSINOPHIL NFR BLD AUTO: 0.4 % (ref 0.3–6.2)
ERYTHROCYTE [DISTWIDTH] IN BLOOD BY AUTOMATED COUNT: 16.6 % (ref 12.3–15.4)
GLOBULIN UR ELPH-MCNC: 3.7 GM/DL
GLUCOSE BLDC GLUCOMTR-MCNC: 142 MG/DL (ref 70–130)
GLUCOSE BLDC GLUCOMTR-MCNC: 284 MG/DL (ref 70–130)
GLUCOSE SERPL-MCNC: 144 MG/DL (ref 65–99)
HCT VFR BLD AUTO: 30.1 % (ref 37.5–51)
HGB BLD-MCNC: 9.6 G/DL (ref 13–17.7)
IMM GRANULOCYTES # BLD AUTO: 0.15 10*3/MM3 (ref 0–0.05)
IMM GRANULOCYTES NFR BLD AUTO: 1.2 % (ref 0–0.5)
LYMPHOCYTES # BLD AUTO: 1.25 10*3/MM3 (ref 0.7–3.1)
LYMPHOCYTES NFR BLD AUTO: 9.7 % (ref 19.6–45.3)
MCH RBC QN AUTO: 28.1 PG (ref 26.6–33)
MCHC RBC AUTO-ENTMCNC: 31.9 G/DL (ref 31.5–35.7)
MCV RBC AUTO: 88 FL (ref 79–97)
MONOCYTES # BLD AUTO: 2.15 10*3/MM3 (ref 0.1–0.9)
MONOCYTES NFR BLD AUTO: 16.7 % (ref 5–12)
NEUTROPHILS NFR BLD AUTO: 71.7 % (ref 42.7–76)
NEUTROPHILS NFR BLD AUTO: 9.22 10*3/MM3 (ref 1.7–7)
NRBC BLD AUTO-RTO: 0 /100 WBC (ref 0–0.2)
PLATELET # BLD AUTO: 195 10*3/MM3 (ref 140–450)
PMV BLD AUTO: 9.5 FL (ref 6–12)
POTASSIUM SERPL-SCNC: 4.1 MMOL/L (ref 3.5–5.2)
PROT SERPL-MCNC: 7.1 G/DL (ref 6–8.5)
RBC # BLD AUTO: 3.42 10*6/MM3 (ref 4.14–5.8)
SODIUM SERPL-SCNC: 138 MMOL/L (ref 136–145)
WBC NRBC COR # BLD: 12.86 10*3/MM3 (ref 3.4–10.8)

## 2023-01-02 PROCEDURE — 63710000001 INSULIN ASPART PER 5 UNITS: Performed by: INTERNAL MEDICINE

## 2023-01-02 PROCEDURE — 96361 HYDRATE IV INFUSION ADD-ON: CPT

## 2023-01-02 PROCEDURE — 87040 BLOOD CULTURE FOR BACTERIA: CPT | Performed by: INTERNAL MEDICINE

## 2023-01-02 PROCEDURE — 82962 GLUCOSE BLOOD TEST: CPT

## 2023-01-02 PROCEDURE — 25010000002 CEFTRIAXONE PER 250 MG: Performed by: INTERNAL MEDICINE

## 2023-01-02 PROCEDURE — 25010000002 HEPARIN (PORCINE) PER 1000 UNITS: Performed by: INTERNAL MEDICINE

## 2023-01-02 PROCEDURE — 85025 COMPLETE CBC W/AUTO DIFF WBC: CPT | Performed by: INTERNAL MEDICINE

## 2023-01-02 PROCEDURE — 96365 THER/PROPH/DIAG IV INF INIT: CPT

## 2023-01-02 PROCEDURE — 96372 THER/PROPH/DIAG INJ SC/IM: CPT

## 2023-01-02 PROCEDURE — G0378 HOSPITAL OBSERVATION PER HR: HCPCS

## 2023-01-02 PROCEDURE — G0379 DIRECT REFER HOSPITAL OBSERV: HCPCS

## 2023-01-02 PROCEDURE — 80053 COMPREHEN METABOLIC PANEL: CPT | Performed by: INTERNAL MEDICINE

## 2023-01-02 RX ORDER — SODIUM CHLORIDE 0.9 % (FLUSH) 0.9 %
10 SYRINGE (ML) INJECTION AS NEEDED
Status: DISCONTINUED | OUTPATIENT
Start: 2023-01-02 | End: 2023-01-05 | Stop reason: HOSPADM

## 2023-01-02 RX ORDER — ACETAMINOPHEN 500 MG
500 TABLET ORAL EVERY 6 HOURS PRN
COMMUNITY

## 2023-01-02 RX ORDER — METOPROLOL SUCCINATE 50 MG/1
50 TABLET, EXTENDED RELEASE ORAL DAILY
Status: DISCONTINUED | OUTPATIENT
Start: 2023-01-02 | End: 2023-01-05 | Stop reason: HOSPADM

## 2023-01-02 RX ORDER — INSULIN ASPART 100 [IU]/ML
0-7 INJECTION, SOLUTION INTRAVENOUS; SUBCUTANEOUS
Status: DISCONTINUED | OUTPATIENT
Start: 2023-01-02 | End: 2023-01-05 | Stop reason: HOSPADM

## 2023-01-02 RX ORDER — ACETAMINOPHEN 325 MG/1
650 TABLET ORAL EVERY 4 HOURS PRN
Status: DISCONTINUED | OUTPATIENT
Start: 2023-01-02 | End: 2023-01-05 | Stop reason: HOSPADM

## 2023-01-02 RX ORDER — MIDODRINE HYDROCHLORIDE 5 MG/1
10 TABLET ORAL
Status: DISCONTINUED | OUTPATIENT
Start: 2023-01-04 | End: 2023-01-05 | Stop reason: HOSPADM

## 2023-01-02 RX ORDER — ONDANSETRON 4 MG/1
4 TABLET, FILM COATED ORAL EVERY 6 HOURS PRN
Status: DISCONTINUED | OUTPATIENT
Start: 2023-01-02 | End: 2023-01-05 | Stop reason: HOSPADM

## 2023-01-02 RX ORDER — METOCLOPRAMIDE 5 MG/1
5 TABLET ORAL 4 TIMES DAILY
Status: DISCONTINUED | OUTPATIENT
Start: 2023-01-02 | End: 2023-01-05 | Stop reason: HOSPADM

## 2023-01-02 RX ORDER — FERRIC CITRATE 210 MG/1
210 TABLET, COATED ORAL
COMMUNITY
End: 2023-01-05 | Stop reason: HOSPADM

## 2023-01-02 RX ORDER — MIDODRINE HYDROCHLORIDE 5 MG/1
10 TABLET ORAL
Status: DISCONTINUED | OUTPATIENT
Start: 2023-01-02 | End: 2023-01-02

## 2023-01-02 RX ORDER — CALCIUM CARBONATE 200(500)MG
1 TABLET,CHEWABLE ORAL 3 TIMES DAILY PRN
Status: DISCONTINUED | OUTPATIENT
Start: 2023-01-02 | End: 2023-01-05 | Stop reason: HOSPADM

## 2023-01-02 RX ORDER — ONDANSETRON 2 MG/ML
4 INJECTION INTRAMUSCULAR; INTRAVENOUS EVERY 6 HOURS PRN
Status: DISCONTINUED | OUTPATIENT
Start: 2023-01-02 | End: 2023-01-05 | Stop reason: HOSPADM

## 2023-01-02 RX ORDER — SODIUM CHLORIDE 9 MG/ML
50 INJECTION, SOLUTION INTRAVENOUS CONTINUOUS
Status: DISCONTINUED | OUTPATIENT
Start: 2023-01-02 | End: 2023-01-05 | Stop reason: HOSPADM

## 2023-01-02 RX ORDER — SIMVASTATIN 80 MG
80 TABLET ORAL NIGHTLY
COMMUNITY
End: 2023-01-05 | Stop reason: HOSPADM

## 2023-01-02 RX ORDER — DEXTROSE MONOHYDRATE 25 G/50ML
25 INJECTION, SOLUTION INTRAVENOUS
Status: DISCONTINUED | OUTPATIENT
Start: 2023-01-02 | End: 2023-01-05 | Stop reason: HOSPADM

## 2023-01-02 RX ORDER — TRAZODONE HYDROCHLORIDE 50 MG/1
50 TABLET ORAL NIGHTLY
Status: DISCONTINUED | OUTPATIENT
Start: 2023-01-02 | End: 2023-01-05 | Stop reason: HOSPADM

## 2023-01-02 RX ORDER — ATORVASTATIN CALCIUM 40 MG/1
40 TABLET, FILM COATED ORAL DAILY
Status: DISCONTINUED | OUTPATIENT
Start: 2023-01-02 | End: 2023-01-05 | Stop reason: HOSPADM

## 2023-01-02 RX ORDER — ASPIRIN 81 MG/1
81 TABLET, CHEWABLE ORAL DAILY
Status: DISCONTINUED | OUTPATIENT
Start: 2023-01-02 | End: 2023-01-05 | Stop reason: HOSPADM

## 2023-01-02 RX ORDER — HEPARIN SODIUM 5000 [USP'U]/ML
5000 INJECTION, SOLUTION INTRAVENOUS; SUBCUTANEOUS EVERY 12 HOURS SCHEDULED
Status: DISCONTINUED | OUTPATIENT
Start: 2023-01-02 | End: 2023-01-05 | Stop reason: HOSPADM

## 2023-01-02 RX ORDER — SODIUM CHLORIDE 9 MG/ML
40 INJECTION, SOLUTION INTRAVENOUS AS NEEDED
Status: DISCONTINUED | OUTPATIENT
Start: 2023-01-02 | End: 2023-01-05 | Stop reason: HOSPADM

## 2023-01-02 RX ORDER — TRAZODONE HYDROCHLORIDE 50 MG/1
50 TABLET ORAL NIGHTLY
Status: ON HOLD | COMMUNITY
End: 2023-01-02

## 2023-01-02 RX ORDER — INSULIN ASPART 100 [IU]/ML
15 INJECTION, SUSPENSION SUBCUTANEOUS 2 TIMES DAILY WITH MEALS
Status: DISCONTINUED | OUTPATIENT
Start: 2023-01-02 | End: 2023-01-05 | Stop reason: HOSPADM

## 2023-01-02 RX ORDER — ACETAMINOPHEN 650 MG/1
650 SUPPOSITORY RECTAL EVERY 4 HOURS PRN
Status: DISCONTINUED | OUTPATIENT
Start: 2023-01-02 | End: 2023-01-05 | Stop reason: HOSPADM

## 2023-01-02 RX ORDER — ACETAMINOPHEN 160 MG/5ML
650 SOLUTION ORAL EVERY 4 HOURS PRN
Status: DISCONTINUED | OUTPATIENT
Start: 2023-01-02 | End: 2023-01-05 | Stop reason: HOSPADM

## 2023-01-02 RX ORDER — SODIUM CHLORIDE 0.9 % (FLUSH) 0.9 %
10 SYRINGE (ML) INJECTION EVERY 12 HOURS SCHEDULED
Status: DISCONTINUED | OUTPATIENT
Start: 2023-01-02 | End: 2023-01-05 | Stop reason: HOSPADM

## 2023-01-02 RX ORDER — NICOTINE POLACRILEX 4 MG
15 LOZENGE BUCCAL
Status: DISCONTINUED | OUTPATIENT
Start: 2023-01-02 | End: 2023-01-05 | Stop reason: HOSPADM

## 2023-01-02 RX ADMIN — CEFTRIAXONE SODIUM 1 G: 1 INJECTION, POWDER, FOR SOLUTION INTRAMUSCULAR; INTRAVENOUS at 17:18

## 2023-01-02 RX ADMIN — SODIUM CHLORIDE 50 ML/HR: 9 INJECTION, SOLUTION INTRAVENOUS at 17:18

## 2023-01-02 RX ADMIN — METOCLOPRAMIDE 5 MG: 5 TABLET ORAL at 20:38

## 2023-01-02 RX ADMIN — Medication 10 ML: at 20:39

## 2023-01-02 RX ADMIN — TRAZODONE HYDROCHLORIDE 50 MG: 50 TABLET ORAL at 20:38

## 2023-01-02 RX ADMIN — HEPARIN SODIUM 5000 UNITS: 5000 INJECTION INTRAVENOUS; SUBCUTANEOUS at 20:38

## 2023-01-02 RX ADMIN — SODIUM CHLORIDE 250 ML: 9 INJECTION, SOLUTION INTRAVENOUS at 17:17

## 2023-01-02 RX ADMIN — INSULIN ASPART 15 UNITS: 100 INJECTION, SUSPENSION SUBCUTANEOUS at 17:30

## 2023-01-02 NOTE — H&P
[facility] Medicine Services  INPATIENT HISTORY AND PHYSICAL       Patient Care Team:  Provider, No Known as PCP - General    Date of Admission: 1/2/2023    Primary Care Physician: Provider, No Known    Chief complaint: Decreased level of consciousness.    Subjective     Patient is a 67 y.o. male with history of end-stage renal disease (on hemodialysis Mondays, Wednesdays and Fridays), diabetes mellitus, hypertension, dyslipidemia who was transferred as a direct admission from [facility] emergency department to [facility] secondary to mental status change arising from sepsis.   Patient was reportedly undergoing hemodialysis today when he developed mental status change with temperature of 101.   There was no reported chest pain, shortness of air, nausea, vomiting, headaches, blurry vision, dysuria or hematuria.    At [facility] emergency department, he had noncontrast CT scan of the head which did not show any acute changes.  His chest x-ray was clear, COVID-19 PCR was negative, lactic acid was 2.6, WBC 18,000 and urinalysis showed evidence of urinary tract infection.  Patient had reportedly had urine culture done a few days prior which showed gram-negative rods.    Patient was discharged from this hospital on 12/29/2022 following a 3-day admission for for mental status change/confusion.      Review of Systems   Constitutional: Positive for activity change, appetite change, fatigue and fever. Negative for chills and diaphoresis.   HENT: Negative for trouble swallowing and voice change.    Eyes: Negative for photophobia and visual disturbance.   Respiratory: Negative for cough, choking, chest tightness, shortness of breath, wheezing and stridor.    Cardiovascular: Negative for chest pain, palpitations and leg swelling.   Gastrointestinal: Negative for abdominal distention, abdominal pain, blood in stool, constipation, diarrhea, nausea and vomiting.   Endocrine:  Negative for cold intolerance, heat intolerance, polydipsia, polyphagia and polyuria.   Genitourinary: Positive for decreased urine volume. Negative for difficulty urinating, dysuria, enuresis, flank pain, frequency, hematuria and urgency.   Musculoskeletal: Negative for arthralgias, gait problem, myalgias, neck pain and neck stiffness.   Skin: Negative for pallor, rash and wound.   Neurological: Negative for dizziness, tremors, seizures, syncope, facial asymmetry, speech difficulty, weakness, light-headedness, numbness and headaches.   Hematological: Does not bruise/bleed easily.   Psychiatric/Behavioral: Positive for confusion. Negative for agitation and behavioral problems.         History  Past Medical History:   Diagnosis Date   • Atrial fibrillation (HCC)    • Blindness    • Diabetes mellitus (HCC)    • Elevated cholesterol    • Hypertension    • Kidney disease    • Renal failure      No past surgical history on file.  Family History   Problem Relation Age of Onset   • Cancer Mother    • Hyperlipidemia Other    • Kidney disease Other    • Hypertension Other    • Diabetes Other      Social History     Tobacco Use   • Smoking status: Never   • Smokeless tobacco: Never   Substance Use Topics   • Alcohol use: Never   • Drug use: Never     Medications Prior to Admission   Medication Sig Dispense Refill Last Dose   • acetaminophen (TYLENOL) 500 MG tablet Take 500 mg by mouth Every 6 (Six) Hours As Needed for Mild Pain (2 capsule daily).      • aspirin 81 MG chewable tablet Chew 81 mg Daily.      • atorvastatin (LIPITOR) 40 MG tablet Take 1 tablet by mouth Daily for 30 days. 30 tablet 0    • Ferric Citrate (Auryxia) 1  MG(Fe) tablet Take 210 mg by mouth 3 (Three) Times a Day. 2 tablets tid      • insulin aspart protamine-insulin aspart (novoLOG 70/30) injection Inject 15 Units under the skin into the appropriate area as directed 2 (Two) Times a Day With Meals.      • Melatonin 10 MG capsule Take 30 mg by mouth  Every Night.      • metoclopramide (REGLAN) 5 MG tablet Take 5 mg by mouth 4 (Four) Times a Day.      • metoprolol succinate XL (TOPROL-XL) 50 MG 24 hr tablet Take 50 mg by mouth Daily.      • midodrine (PROAMATINE) 10 MG tablet Take 10 mg by mouth 3 (Three) Times a Week. 30 minutes before dialysis      • Multiple Vitamins-Minerals (PRESERVISION AREDS 2 PO) Take 2 tablet/day by mouth Daily.      • multivitamin with minerals tablet tablet Take 1 tablet by mouth Daily.      • SEVELAMER HCL PO Take 800 mg by mouth Daily With Dinner.      • simvastatin (ZOCOR) 80 MG tablet Take 80 mg by mouth Every Night.      • traZODone (DESYREL) 50 MG tablet Take 50 mg by mouth Every Night.      • vitamin D (ERGOCALCIFEROL) 1.25 MG (77051 UT) capsule capsule Take 50,000 Units by mouth Every 7 (Seven) Days.        Allergies:  Patient has no known allergies.  Prior to Admission medications    Medication Sig Start Date End Date Taking? Authorizing Provider   acetaminophen (TYLENOL) 500 MG tablet Take 500 mg by mouth Every 6 (Six) Hours As Needed for Mild Pain (2 capsule daily).    Dwaine Valentin MD   aspirin 81 MG chewable tablet Chew 81 mg Daily.    Dwaine Valentin MD   atorvastatin (LIPITOR) 40 MG tablet Take 1 tablet by mouth Daily for 30 days. 12/29/22 1/28/23  Clifton Davis MD   Ferric Citrate (Auryxia) 1  MG(Fe) tablet Take 210 mg by mouth 3 (Three) Times a Day. 2 tablets tid    Dwaine Valentin MD   insulin aspart protamine-insulin aspart (novoLOG 70/30) injection Inject 15 Units under the skin into the appropriate area as directed 2 (Two) Times a Day With Meals.    Dwaine Valentin MD   Melatonin 10 MG capsule Take 30 mg by mouth Every Night.    Dwaine Valentin MD   metoclopramide (REGLAN) 5 MG tablet Take 5 mg by mouth 4 (Four) Times a Day.    Dwaine Valentin MD   metoprolol succinate XL (TOPROL-XL) 50 MG 24 hr tablet Take 50 mg by mouth Daily.    Dwaine Valentin MD    midodrine (PROAMATINE) 10 MG tablet Take 10 mg by mouth 3 (Three) Times a Week. 30 minutes before dialysis    Dwaine Valentin MD   Multiple Vitamins-Minerals (PRESERVISION AREDS 2 PO) Take 2 tablet/day by mouth Daily.    Dwaine Valentin MD   multivitamin with minerals tablet tablet Take 1 tablet by mouth Daily.    Dwaine Valentin MD   SEVELAMER HCL PO Take 800 mg by mouth Daily With Dinner.    Dwaine Valentin MD   simvastatin (ZOCOR) 80 MG tablet Take 80 mg by mouth Every Night.    Dwaine Valentin MD   traZODone (DESYREL) 50 MG tablet Take 50 mg by mouth Every Night.    Dwaine Valentin MD   vitamin D (ERGOCALCIFEROL) 1.25 MG (26998 UT) capsule capsule Take 50,000 Units by mouth Every 7 (Seven) Days. 12/28/22   Dwaine Valentin MD       Objective        Vital Signs  Temp:  [100.4 °F (38 °C)] 100.4 °F (38 °C)  Heart Rate:  [87] 87  Resp:  [20] 20  BP: (138)/(63) 138/63      Physical Exam  Vitals and nursing note reviewed.   Constitutional:       General: He is not in acute distress.     Appearance: He is well-developed. He is ill-appearing. He is not diaphoretic.      Comments: Patient is chronically ill looking.   HENT:      Head: Normocephalic and atraumatic.   Eyes:      General: No scleral icterus.        Right eye: No discharge.         Left eye: No discharge.      Extraocular Movements: Extraocular movements intact.      Pupils: Pupils are equal, round, and reactive to light.   Neck:      Thyroid: No thyromegaly.      Vascular: No carotid bruit or JVD.   Cardiovascular:      Rate and Rhythm: Normal rate and regular rhythm.      Heart sounds: Normal heart sounds. No murmur heard.    No friction rub. No gallop.   Pulmonary:      Effort: Pulmonary effort is normal. No respiratory distress.      Breath sounds: Normal breath sounds. No stridor. No wheezing or rales.   Chest:      Chest wall: No tenderness.   Abdominal:      General: Bowel sounds are normal. There is no  distension.      Palpations: Abdomen is soft. There is no mass.      Tenderness: There is no abdominal tenderness. There is no right CVA tenderness, left CVA tenderness, guarding or rebound.      Hernia: No hernia is present.   Genitourinary:     Comments: Indwelling Suero catheter is in place.  Musculoskeletal:         General: No swelling, tenderness or deformity.      Cervical back: Normal range of motion and neck supple.      Right lower leg: No edema.      Left lower leg: No edema.   Skin:     General: Skin is warm and dry.      Coloration: Skin is not jaundiced or pale.      Findings: No bruising, erythema, lesion or rash.   Neurological:      General: No focal deficit present.      Mental Status: He is alert and oriented to person, place, and time.      Cranial Nerves: No cranial nerve deficit.      Sensory: No sensory deficit.      Motor: No weakness or abnormal muscle tone.      Coordination: Coordination normal.      Gait: Gait normal.      Deep Tendon Reflexes: Reflexes normal.   Psychiatric:         Mood and Affect: Mood normal.         Behavior: Behavior normal.         Thought Content: Thought content normal.         Judgment: Judgment normal.           Results Review:     Results from last 7 days   Lab Units 12/29/22  0611 12/28/22 0529 12/27/22 0607 12/26/22  1855   SODIUM mmol/L 135* 138 139 138   POTASSIUM mmol/L 4.0 4.5 4.7 4.6   CHLORIDE mmol/L 91* 96* 96* 94*   CO2 mmol/L 30.0* 28.0 30.0* 29.0   BUN mg/dL 39* 60* 49* 44*   CREATININE mg/dL 5.74* 7.71* 5.90* 5.72*   GLUCOSE mg/dL 260* 202* 198* 230*   CALCIUM mg/dL 9.8 9.8 9.5 9.5   BILIRUBIN mg/dL  --   --   --  0.4   ALK PHOS U/L  --   --   --  138*   ALT (SGPT) U/L  --   --   --  20   AST (SGOT) U/L  --   --   --  19       Results from last 7 days   Lab Units 12/29/22  0611 12/28/22  0529 12/27/22  0607   MAGNESIUM mg/dL 1.7 2.0 1.9       Results from last 7 days   Lab Units 12/29/22  0611 12/28/22  0529 12/27/22  0607 12/26/22  1855   WBC  10*3/mm3 15.13* 15.00* 8.41 11.69*   HEMOGLOBIN g/dL 10.7* 10.9* 10.5* 10.8*   HEMATOCRIT % 34.1* 34.3* 33.9* 34.2*   PLATELETS 10*3/mm3 187 171 152 143           Imaging Results (Last 7 Days)     ** No results found for the last 168 hours. **          Assessment / Plan       Hospital Problem List:    Acute metabolic encephalopathy    Acute metabolic encephalopathy/sepsis (likely secondary to acute cystitis): Patient's mental status was back to normal during my assessment.  Begin IV antibiotics, gentle IV hydration and check blood cultures.  Leukocytosis and lactic acidosis are reactive and will be monitored.    End-stage renal disease: Consult nephrologist for inpatient hemodialysis on his regularly scheduled days.    Diabetes mellitus: Continue basal insulin with Accu-Cheks and sliding scale insulin.    Begin GI and DVT prophylaxis.    Additional orders and treatment plan as hospital course dictates.    I confirmed that the patient's Advance Care Plan is present, code status is documented, or surrogate decision maker is listed in the patient's medical record.     I have utilized all available immediate resources to obtain, update, or review the patient's current medications    I discussed the patient's findings and my recommendations with patient.     Antwon Solis MD  01/02/23  15:04 CST      Dictated Utilizing Dragon Dictation

## 2023-01-03 LAB
ANION GAP SERPL CALCULATED.3IONS-SCNC: 14 MMOL/L (ref 5–15)
BASOPHILS # BLD AUTO: 0.04 10*3/MM3 (ref 0–0.2)
BASOPHILS NFR BLD AUTO: 0.4 % (ref 0–1.5)
BUN SERPL-MCNC: 52 MG/DL (ref 8–23)
BUN/CREAT SERPL: 7.3 (ref 7–25)
CALCIUM SPEC-SCNC: 9.6 MG/DL (ref 8.6–10.5)
CHLORIDE SERPL-SCNC: 95 MMOL/L (ref 98–107)
CO2 SERPL-SCNC: 27 MMOL/L (ref 22–29)
CREAT SERPL-MCNC: 7.11 MG/DL (ref 0.76–1.27)
D-LACTATE SERPL-SCNC: 0.7 MMOL/L (ref 0.5–2)
DEPRECATED RDW RBC AUTO: 53.6 FL (ref 37–54)
EGFRCR SERPLBLD CKD-EPI 2021: 7.8 ML/MIN/1.73
EOSINOPHIL # BLD AUTO: 0.18 10*3/MM3 (ref 0–0.4)
EOSINOPHIL NFR BLD AUTO: 1.7 % (ref 0.3–6.2)
ERYTHROCYTE [DISTWIDTH] IN BLOOD BY AUTOMATED COUNT: 16.4 % (ref 12.3–15.4)
GLUCOSE BLDC GLUCOMTR-MCNC: 199 MG/DL (ref 70–130)
GLUCOSE BLDC GLUCOMTR-MCNC: 222 MG/DL (ref 70–130)
GLUCOSE BLDC GLUCOMTR-MCNC: 235 MG/DL (ref 70–130)
GLUCOSE SERPL-MCNC: 207 MG/DL (ref 65–99)
HCT VFR BLD AUTO: 28.9 % (ref 37.5–51)
HGB BLD-MCNC: 9.1 G/DL (ref 13–17.7)
IMM GRANULOCYTES # BLD AUTO: 0.08 10*3/MM3 (ref 0–0.05)
IMM GRANULOCYTES NFR BLD AUTO: 0.8 % (ref 0–0.5)
LYMPHOCYTES # BLD AUTO: 1.93 10*3/MM3 (ref 0.7–3.1)
LYMPHOCYTES NFR BLD AUTO: 18.7 % (ref 19.6–45.3)
MCH RBC QN AUTO: 28.3 PG (ref 26.6–33)
MCHC RBC AUTO-ENTMCNC: 31.5 G/DL (ref 31.5–35.7)
MCV RBC AUTO: 89.8 FL (ref 79–97)
MONOCYTES # BLD AUTO: 2.02 10*3/MM3 (ref 0.1–0.9)
MONOCYTES NFR BLD AUTO: 19.6 % (ref 5–12)
NEUTROPHILS NFR BLD AUTO: 58.8 % (ref 42.7–76)
NEUTROPHILS NFR BLD AUTO: 6.07 10*3/MM3 (ref 1.7–7)
NRBC BLD AUTO-RTO: 0 /100 WBC (ref 0–0.2)
PLATELET # BLD AUTO: 170 10*3/MM3 (ref 140–450)
PMV BLD AUTO: 9.1 FL (ref 6–12)
POTASSIUM SERPL-SCNC: 4 MMOL/L (ref 3.5–5.2)
RBC # BLD AUTO: 3.22 10*6/MM3 (ref 4.14–5.8)
SODIUM SERPL-SCNC: 136 MMOL/L (ref 136–145)
WBC NRBC COR # BLD: 10.32 10*3/MM3 (ref 3.4–10.8)

## 2023-01-03 PROCEDURE — G0378 HOSPITAL OBSERVATION PER HR: HCPCS

## 2023-01-03 PROCEDURE — 96366 THER/PROPH/DIAG IV INF ADDON: CPT

## 2023-01-03 PROCEDURE — 82962 GLUCOSE BLOOD TEST: CPT

## 2023-01-03 PROCEDURE — 96372 THER/PROPH/DIAG INJ SC/IM: CPT

## 2023-01-03 PROCEDURE — 85025 COMPLETE CBC W/AUTO DIFF WBC: CPT | Performed by: INTERNAL MEDICINE

## 2023-01-03 PROCEDURE — 63710000001 INSULIN ASPART PER 5 UNITS: Performed by: INTERNAL MEDICINE

## 2023-01-03 PROCEDURE — 25010000002 CEFTRIAXONE PER 250 MG: Performed by: INTERNAL MEDICINE

## 2023-01-03 PROCEDURE — 97162 PT EVAL MOD COMPLEX 30 MIN: CPT

## 2023-01-03 PROCEDURE — 25010000002 HEPARIN (PORCINE) PER 1000 UNITS: Performed by: INTERNAL MEDICINE

## 2023-01-03 PROCEDURE — 97166 OT EVAL MOD COMPLEX 45 MIN: CPT

## 2023-01-03 PROCEDURE — 83605 ASSAY OF LACTIC ACID: CPT | Performed by: INTERNAL MEDICINE

## 2023-01-03 PROCEDURE — 80048 BASIC METABOLIC PNL TOTAL CA: CPT | Performed by: INTERNAL MEDICINE

## 2023-01-03 RX ADMIN — ASPIRIN 81 MG: 81 TABLET, CHEWABLE ORAL at 08:00

## 2023-01-03 RX ADMIN — INSULIN ASPART 4 UNITS: 100 INJECTION, SOLUTION INTRAVENOUS; SUBCUTANEOUS at 17:04

## 2023-01-03 RX ADMIN — METOCLOPRAMIDE 5 MG: 5 TABLET ORAL at 11:04

## 2023-01-03 RX ADMIN — ATORVASTATIN CALCIUM 40 MG: 40 TABLET, FILM COATED ORAL at 08:00

## 2023-01-03 RX ADMIN — INSULIN ASPART 15 UNITS: 100 INJECTION, SUSPENSION SUBCUTANEOUS at 07:58

## 2023-01-03 RX ADMIN — METOCLOPRAMIDE 5 MG: 5 TABLET ORAL at 08:00

## 2023-01-03 RX ADMIN — METOPROLOL SUCCINATE 50 MG: 50 TABLET, EXTENDED RELEASE ORAL at 08:00

## 2023-01-03 RX ADMIN — METOCLOPRAMIDE 5 MG: 5 TABLET ORAL at 20:09

## 2023-01-03 RX ADMIN — TRAZODONE HYDROCHLORIDE 50 MG: 50 TABLET ORAL at 20:09

## 2023-01-03 RX ADMIN — METOCLOPRAMIDE 5 MG: 5 TABLET ORAL at 17:04

## 2023-01-03 RX ADMIN — HEPARIN SODIUM 5000 UNITS: 5000 INJECTION INTRAVENOUS; SUBCUTANEOUS at 08:00

## 2023-01-03 RX ADMIN — INSULIN ASPART 15 UNITS: 100 INJECTION, SUSPENSION SUBCUTANEOUS at 17:04

## 2023-01-03 RX ADMIN — HEPARIN SODIUM 5000 UNITS: 5000 INJECTION INTRAVENOUS; SUBCUTANEOUS at 21:17

## 2023-01-03 RX ADMIN — INSULIN ASPART 3 UNITS: 100 INJECTION, SOLUTION INTRAVENOUS; SUBCUTANEOUS at 07:57

## 2023-01-03 RX ADMIN — SODIUM CHLORIDE 50 ML/HR: 9 INJECTION, SOLUTION INTRAVENOUS at 09:36

## 2023-01-03 RX ADMIN — Medication 10 ML: at 21:17

## 2023-01-03 RX ADMIN — CEFTRIAXONE SODIUM 1 G: 1 INJECTION, POWDER, FOR SOLUTION INTRAMUSCULAR; INTRAVENOUS at 15:16

## 2023-01-03 RX ADMIN — INSULIN ASPART 3 UNITS: 100 INJECTION, SOLUTION INTRAVENOUS; SUBCUTANEOUS at 11:04

## 2023-01-03 RX ADMIN — Medication 10 ML: at 08:01

## 2023-01-03 NOTE — CONSULTS
[facility]  [address] [place] [address]. [address]  T - [phone]  F  [phone]     Consultation         PATIENT  DEMOGRAPHICS   PATIENT NAME: Austin Egan                      PHYSICIAN: REFUGIO Flores  : 1955  MRN: [id number]    Subjective   SUBJECTIVE   Referring Provider: Dr. Solis  Reason for Consultation: ESRD on HD  History of present illness: This is a 67-year-old male with a past medical history significant for ESRD on hemodialysis, diabetes mellitus type 2, hypertension, hyperlipidemia who presented as a direct admission/transfer from [facility] emergency department.  He had altered mental status secondary to sepsis.  He was reportedly at his routine hemodialysis treatment and developed altered mental status as well as elevated temperature of 101.  He was referred to the emergency department subsequently transferred to Psychiatric.  He is found to have urinary tract infection.  He has chronic freeman but not changed it in a while.Today nephrology is consulted to manage his hemodialysis needs.    Past Medical History:   Diagnosis Date   • Atrial fibrillation (HCC)    • Blindness    • Diabetes mellitus (HCC)    • Elevated cholesterol    • Hypertension    • Kidney disease    • Renal failure      No past surgical history on file.  Family History   Problem Relation Age of Onset   • Cancer Mother    • Hyperlipidemia Other    • Kidney disease Other    • Hypertension Other    • Diabetes Other      Social History     Tobacco Use   • Smoking status: Never   • Smokeless tobacco: Never   Substance Use Topics   • Alcohol use: Never   • Drug use: Never     Allergies:  Patient has no known allergies.     REVIEW OF SYSTEMS    Review of Systems   All other systems reviewed and are negative.      Objective   OBJECTIVE   Vital Signs  Temp:  [98.7 °F (37.1 °C)-100.4 °F (38 °C)] 99.3 °F (37.4 °C)  Heart Rate:  [78-93] 82  Resp:  [18-20] 18  BP: (134-152)/(63-65)  142/65    Flowsheet Rows    Flowsheet Row First Filed Value   Admission Height 180.3 cm (70.98\") Documented at 01/02/2023 1410   Admission Weight 85 kg (187 lb 6.4 oz) Documented at 01/02/2023 1504           I/O last 3 completed shifts:  In: -   Out: 450 [Urine:450]    PHYSICAL EXAM    Physical Exam  Constitutional:       Appearance: He is well-developed.   HENT:      Head: Normocephalic and atraumatic.   Eyes:      Conjunctiva/sclera: Conjunctivae normal.      Pupils: Pupils are equal, round, and reactive to light.   Cardiovascular:      Rate and Rhythm: Normal rate and regular rhythm.   Pulmonary:      Effort: Pulmonary effort is normal.      Breath sounds: Normal breath sounds.   Abdominal:      Palpations: Abdomen is soft.   Musculoskeletal:      Cervical back: Neck supple.      Right lower leg: No edema.      Left lower leg: No edema.   Skin:     General: Skin is warm and dry.   Neurological:      Mental Status: He is alert and oriented to person, place, and time.   Psychiatric:         Mood and Affect: Mood normal.         Behavior: Behavior normal.         RESULTS   Results Review:    Results from last 7 days   Lab Units 01/03/23  0818 01/02/23  1538 12/29/22  0611   SODIUM mmol/L 136 138 135*   POTASSIUM mmol/L 4.0 4.1 4.0   CHLORIDE mmol/L 95* 95* 91*   CO2 mmol/L 27.0 27.0 30.0*   BUN mg/dL 52* 40* 39*   CREATININE mg/dL 7.11* 5.90* 5.74*   CALCIUM mg/dL 9.6 9.5 9.8   BILIRUBIN mg/dL  --  0.3  --    ALK PHOS U/L  --  104  --    ALT (SGPT) U/L  --  16  --    AST (SGOT) U/L  --  20  --    GLUCOSE mg/dL 207* 144* 260*       Estimated Creatinine Clearance: 12.2 mL/min (A) (by C-G formula based on SCr of 7.11 mg/dL (H)).    Results from last 7 days   Lab Units 12/29/22  0611 12/28/22  0529   MAGNESIUM mg/dL 1.7 2.0             Results from last 7 days   Lab Units 01/03/23  0818 01/02/23  1538 12/29/22  0611 12/28/22  0529   WBC 10*3/mm3 10.32 12.86* 15.13* 15.00*   HEMOGLOBIN g/dL 9.1* 9.6* 10.7* 10.9*    PLATELETS 10*3/mm3 170 195 187 171              MEDICATIONS    aspirin, 81 mg, Oral, Daily  atorvastatin, 40 mg, Oral, Daily  cefTRIAXone, 1 g, Intravenous, Q24H  heparin (porcine), 5,000 Units, Subcutaneous, Q12H  Insulin Aspart, 0-7 Units, Subcutaneous, TID AC  insulin aspart prot-insulin aspart, 15 Units, Subcutaneous, BID With Meals  metoclopramide, 5 mg, Oral, 4x Daily  metoprolol succinate XL, 50 mg, Oral, Daily  [START ON 1/4/2023] midodrine, 10 mg, Oral, Once per day on Mon Wed Fri  sodium chloride, 10 mL, Intravenous, Q12H  traZODone, 50 mg, Oral, Nightly      sodium chloride, 50 mL/hr, Last Rate: 50 mL/hr (01/03/23 0936)      Medications Prior to Admission   Medication Sig Dispense Refill Last Dose   • acetaminophen (TYLENOL) 500 MG tablet Take 500 mg by mouth Every 6 (Six) Hours As Needed for Mild Pain (2 capsule daily).   1/1/2023   • aspirin 81 MG chewable tablet Chew 81 mg Daily.   1/2/2023   • atorvastatin (LIPITOR) 40 MG tablet Take 1 tablet by mouth Daily for 30 days. 30 tablet 0 1/1/2023   • insulin aspart protamine-insulin aspart (novoLOG 70/30) injection Inject 15 Units under the skin into the appropriate area as directed 2 (Two) Times a Day With Meals.   1/2/2023   • Melatonin 10 MG capsule Take 30 mg by mouth Every Night.   1/1/2023   • metoclopramide (REGLAN) 5 MG tablet Take 5 mg by mouth 4 (Four) Times a Day.   1/2/2023   • metoprolol succinate XL (TOPROL-XL) 50 MG 24 hr tablet Take 50 mg by mouth Daily.   1/2/2023   • midodrine (PROAMATINE) 10 MG tablet Take 10 mg by mouth 3 (Three) Times a Week. 30 minutes before dialysis   1/2/2023   • SEVELAMER HCL PO Take 800 mg by mouth Daily With Dinner.   1/2/2023   • vitamin D (ERGOCALCIFEROL) 1.25 MG (07467 UT) capsule capsule Take 50,000 Units by mouth Every 7 (Seven) Days.   1/2/2023   • Ferric Citrate (Auryxia) 1  MG(Fe) tablet Take 210 mg by mouth 3 (Three) Times a Day. 2 tablets tid      • Multiple Vitamins-Minerals (PRESERVISION AREDS  2 PO) Take 2 tablet/day by mouth Daily.      • multivitamin with minerals tablet tablet Take 1 tablet by mouth Daily.      • simvastatin (ZOCOR) 80 MG tablet Take 80 mg by mouth Every Night.        Assessment & Plan   ASSESSMENT / PLAN      Acute metabolic encephalopathy    1.  ESRD on HD- normally dialyzes MWF at [place] under Dr. Gan.  Last dialysis was Monday.  We will plan to continue dialysis MWF while here.  He does have left upper extremity graft which he says we are using as well as a tunneled dialysis catheter.    2.  Altered mental status- secondary to UTI. Has chronic freeman    3.  UTI- manage per primary team    4.  Sepsis- blood culture results pending, managed per primary team    5.  DM2      Thank you for the consult, we will continue to follow the patient.         I discussed the patients findings and my recommendations with patient      This document has been electronically signed by REFUGIO Flores on January 3, 2023 12:59 CST      For this patient encounter, I have reviewed the Nurse Practitioner's documentation, medical decision making, and treatment plan and personally spent time with the patient.

## 2023-01-03 NOTE — PLAN OF CARE
Problem: Adult Inpatient Plan of Care  Goal: Plan of Care Review  Outcome: Ongoing, Progressing  Flowsheets (Taken 1/3/2023 1521)  Progress: improving  Plan of Care Reviewed With:   patient   daughter  Outcome Evaluation: Weaned off of oxygen.  Worked with PT/OT.  Assist X1 with walker.  Spoke with daughter concerning pt status.  IV abx infused as ordered.  Blood sugars treated per order.  M, W, F dialysis.   Goal Outcome Evaluation:  Plan of Care Reviewed With: patient, daughter        Progress: improving  Outcome Evaluation: Weaned off of oxygen.  Worked with PT/OT.  Assist X1 with walker.  Spoke with daughter concerning pt status.  IV abx infused as ordered.  Blood sugars treated per order.  M, W, F dialysis.

## 2023-01-03 NOTE — THERAPY EVALUATION
Patient Name: Austin Egan  : 1955    MRN: [id number]                              Today's Date: 1/3/2023       Admit Date: 2023    Visit Dx:     ICD-10-CM ICD-9-CM   1. Impaired mobility and ADLs  Z74.09 V49.89    Z78.9      Patient Active Problem List   Diagnosis   • Confusion and disorientation   • Acute metabolic encephalopathy     Past Medical History:   Diagnosis Date   • Atrial fibrillation (HCC)    • Blindness    • Diabetes mellitus (HCC)    • Elevated cholesterol    • Hypertension    • Kidney disease    • Renal failure      No past surgical history on file.   General Information     Row Name 23 1434          OT Time and Intention    Document Type evaluation  -     Mode of Treatment co-treatment;physical therapy;occupational therapy  -     Row Name 23 1434          General Information    Patient Profile Reviewed yes  -     Prior Level of Function independent:;dressing;bathing;all household mobility;dependent:;cooking;cleaning;driving  -     Existing Precautions/Restrictions fall  -     Row Name 23 1434          Living Environment    People in Home child(niko), adult  -     Row Name 23 1434          Home Main Entrance    Number of Stairs, Main Entrance other (see comments)  ramp  -     Stair Railings, Main Entrance none  -     Row Name 23 1434          Stairs Within Home, Primary    Stairs, Within Home, Primary tub shower, tub bench, raised toilet seat, standard walker, cane  -     Number of Stairs, Within Home, Primary none  -     Stair Railings, Within Home, Primary none  -     Row Name 23 1434          Cognition    Orientation Status (Cognition) oriented x 3;oriented to;place;verbal cues/prompts needed for orientation  -     Row Name 23 1434          Safety Issues, Functional Mobility    Impairments Affecting Function (Mobility) endurance/activity tolerance;strength;balance  -           User Key  (r) = Recorded By, (t) =  Taken By, (c) = Cosigned By    Initials Name Provider Type    Michelle Candelario OT Occupational Therapist                 Mobility/ADL's     Row Name 01/03/23 1434          Bed Mobility    Bed Mobility supine-sit;sit-supine  -     Supine-Sit [place] (Bed Mobility) minimum assist (75% patient effort)  -     Sit-Supine [place] (Bed Mobility) minimum assist (75% patient effort)  -     Assistive Device (Bed Mobility) bed rails;head of bed elevated  -     Row Name 01/03/23 1434          Transfers    Transfers sit-stand transfer;toilet transfer  -     Row Name 01/03/23 1434          Sit-Stand Transfer    Sit-Stand [place] (Transfers) contact guard  -     Assistive Device (Sit-Stand Transfers) walker, front-wheeled  -     Row Name 01/03/23 1434          Toilet Transfer    Type (Toilet Transfer) sit-stand;stand-sit  -     [place] Level (Toilet Transfer) contact guard  -     Assistive Device (Toilet Transfer) grab bars/safety frame;raised toilet seat  -     Row Name 01/03/23 1434          Activities of Daily Living    BADL Assessment/Intervention lower body dressing  -     Row Name 01/03/23 1434          Lower Body Dressing Assessment/Training    [place] Level (Lower Body Dressing) don;doff;socks;standby assist  -     Position (Lower Body Dressing) edge of bed sitting  -           User Key  (r) = Recorded By, (t) = Taken By, (c) = Cosigned By    Initials Name Provider Type    Michelle Candelario OT Occupational Therapist               Obj/Interventions     Row Name 01/03/23 1434          Sensory Assessment (Somatosensory)    Sensory Assessment (Somatosensory) UE sensation intact  -     Row Name 01/03/23 1434          Range of Motion Comprehensive    General Range of Motion bilateral upper extremity ROM WFL  -     Row Name 01/03/23 1434          Strength Comprehensive (MMT)    Comment, General Manual Muscle Testing (MMT) Assessment BUE MMT 4-/5 grossly  -            User Key  (r) = Recorded By, (t) = Taken By, (c) = Cosigned By    Initials Name Provider Type    Michelle Candelario OT Occupational Therapist               Goals/Plan     Row Name 01/03/23 1434          Transfer Goal 1 (OT)    Activity/Assistive Device (Transfer Goal 1, OT) transfers, all  -     [place] Level/Cues Needed (Transfer Goal 1, OT) independent  -     Time Frame (Transfer Goal 1, OT) long term goal (LTG);by discharge  -     Progress/Outcome (Transfer Goal 1, OT) goal not met  -     Row Name 01/03/23 1434          Dressing Goal 1 (OT)    Activity/Device (Dressing Goal 1, OT) dressing skills, all  -     [place]/Cues Needed (Dressing Goal 1, OT) independent  -     Time Frame (Dressing Goal 1, OT) long term goal (LTG);by discharge  -     Progress/Outcome (Dressing Goal 1, OT) goal not met  -     Row Name 01/03/23 1434          Problem Specific Goal 1 (OT)    Problem Specific Goal 1 (OT) Pt will participate in OOB fxnl activities for ~20 minutes to improve fxnl endurance.  -     Time Frame (Problem Specific Goal 1, OT) long term goal (LTG);by discharge  -     Progress/Outcome (Problem Specific Goal 1, OT) goal not met  -     Row Name 01/03/23 1434          Therapy Assessment/Plan (OT)    Planned Therapy Interventions (OT) activity tolerance training;manual therapy/joint mobilization;patient/caregiver education/training;adaptive equipment training;BADL retraining;cognitive/visual perception retraining;edema control/reduction;functional balance retraining;IADL retraining;passive ROM/stretching;occupation/activity based interventions;neuromuscular control/coordination retraining;ROM/therapeutic exercise;strengthening exercise;transfer/mobility retraining  -           User Key  (r) = Recorded By, (t) = Taken By, (c) = Cosigned By    Initials Name Provider Type    Michelle Candelario OT Occupational Therapist               Clinical Impression     Row Name 01/03/23 1434           Pain Assessment    Pretreatment Pain Rating 0/10 - no pain  -     Posttreatment Pain Rating 0/10 - no pain  -     Row Name 01/03/23 1434          Plan of Care Review    Plan of Care Reviewed With patient  -     Progress no change  -     Outcome Evaluation OT eval completed, co-eval with PT. Pt fowlers in bed and agreeable to the session. Pt presents with a flat affect. Pt min A for sup<>sit and sit<>sup with HOB elevated, the use of bed rails, and VCs. Pt SBA for donning/doffing socks while sitting EOB. Pt BUE MMT 4-/5 grossly. Pt CGA for sit to stand t/f with RW. Pt CGA for toilet t/f with raised toilet seat. Pt CGA for fxnl mobility with RW. Pt demonstrates decreased fxnl mobility, endurance, and independence in ADLs. Pt would benefit from skilled OT services. Recommend d/c home with assist, HHOT.  -     Row Name 01/03/23 1434          Therapy Assessment/Plan (OT)    Patient/Family Therapy Goal Statement (OT) return home  -     Rehab Potential (OT) good, to achieve stated therapy goals  -     Criteria for Skilled Therapeutic Interventions Met (OT) yes;skilled treatment is necessary  -     Therapy Frequency (OT) other (see comments)  5-7d/wk  -     Row Name 01/03/23 1434          Therapy Plan Review/Discharge Plan (OT)    Anticipated Discharge Disposition (OT) home with assist;home with home health  -     Row Name 01/03/23 1434          Vital Signs    Pre Systolic BP Rehab 112  -MC     Pre Treatment Diastolic BP 56  -MC     Pretreatment Heart Rate (beats/min) 71  -MC     Pre SpO2 (%) 93  -MC     O2 Delivery Pre Treatment room air  -     Pre Patient Position Supine  -     Row Name 01/03/23 1434          Positioning and Restraints    Pre-Treatment Position in bed  -     Post Treatment Position bed  -     In Bed fowlers;call light within reach;encouraged to call for assist;exit alarm on  -           User Key  (r) = Recorded By, (t) = Taken By, (c) = Cosigned By    Initials Name  Provider Type    Michelle Candelario OT Occupational Therapist               Outcome Measures     Row Name 01/03/23 1434          How much help from another is currently needed...    Putting on and taking off regular lower body clothing? 4  -MC     Bathing (including washing, rinsing, and drying) 3  -MC     Toileting (which includes using toilet bed pan or urinal) 4  -MC     Putting on and taking off regular upper body clothing 4  -MC     Taking care of personal grooming (such as brushing teeth) 4  -MC     Eating meals 4  -MC     AM-PAC 6 Clicks Score (OT) 23  -MC     Row Name 01/03/23 1435 01/03/23 0936       How much help from another person do you currently need...    Turning from your back to your side while in flat bed without using bedrails? 3  -CZ 1  -MS    Moving from lying on back to sitting on the side of a flat bed without bedrails? 3  -CZ 1  -MS    Moving to and from a bed to a chair (including a wheelchair)? 3  -CZ 1  -MS    Standing up from a chair using your arms (e.g., wheelchair, bedside chair)? 3  -CZ 1  -MS    Climbing 3-5 steps with a railing? 2  -CZ 1  -MS    To walk in hospital room? 3  -CZ 2  -MS    AM-PAC 6 Clicks Score (PT) 17  -CZ 7  -MS    Highest level of mobility 5 --> Static standing  -CZ 2 --> Bed activities/dependent transfer  -MS    Row Name 01/03/23 1435 01/03/23 1434       Functional Assessment    Outcome Measure Options AM-PAC 6 Clicks Basic Mobility (PT)  - AM-PAC 6 Clicks Daily Activity (OT)  -          User Key  (r) = Recorded By, (t) = Taken By, (c) = Cosigned By    Initials Name Provider Type    John Paul Vásquez, PT Physical Therapist    Liya Rosales, RN Registered Nurse    Michelle Candelario OT Occupational Therapist                Occupational Therapy Education     Title: PT OT SLP Therapies (In Progress)     Topic: Occupational Therapy (In Progress)     Point: ADL training (Done)     Description:   Instruct learner(s) on proper safety adaptation and  remediation techniques during self care or transfers.   Instruct in proper use of assistive devices.              Learning Progress Summary           Patient Acceptance, E,TB, VU by  at 1/3/2023 1514    Comment: OT role, POC, t/f training                   Point: Home exercise program (Not Started)     Description:   Instruct learner(s) on appropriate technique for monitoring, assisting and/or progressing therapeutic exercises/activities.              Learner Progress:  Not documented in this visit.          Point: Precautions (Done)     Description:   Instruct learner(s) on prescribed precautions during self-care and functional transfers.              Learning Progress Summary           Patient Acceptance, E,TB, VU by  at 1/3/2023 1514    Comment: OT role, POC, t/f training                   Point: Body mechanics (Done)     Description:   Instruct learner(s) on proper positioning and spine alignment during self-care, functional mobility activities and/or exercises.              Learning Progress Summary           Patient Acceptance, E,TB, VU by  at 1/3/2023 1514    Comment: OT role, POC, t/f training                               User Key     Initials Effective Dates Name Provider Type Discipline     10/19/22 -  Michelle Padilla OT Occupational Therapist OT              OT Recommendation and Plan  Planned Therapy Interventions (OT): activity tolerance training, manual therapy/joint mobilization, patient/caregiver education/training, adaptive equipment training, BADL retraining, cognitive/visual perception retraining, edema control/reduction, functional balance retraining, IADL retraining, passive ROM/stretching, occupation/activity based interventions, neuromuscular control/coordination retraining, ROM/therapeutic exercise, strengthening exercise, transfer/mobility retraining  Therapy Frequency (OT): other (see comments) (5-7d/wk)  Plan of Care Review  Plan of Care Reviewed With: patient  Progress: no  change  Outcome Evaluation: OT eval completed, co-eval with PT. Pt fowlers in bed and agreeable to the session. Pt presents with a flat affect. Pt min A for sup<>sit and sit<>sup with HOB elevated, the use of bed rails, and VCs. Pt SBA for donning/doffing socks while sitting EOB. Pt BUE MMT 4-/5 grossly. Pt CGA for sit to stand t/f with RW. Pt CGA for toilet t/f with raised toilet seat. Pt CGA for fxnl mobility with RW. Pt demonstrates decreased fxnl mobility, endurance, and independence in ADLs. Pt would benefit from skilled OT services. Recommend d/c home with assist, HHOT.     Time Calculation:    Time Calculation- OT     Row Name 01/03/23 1434             Time Calculation- OT    OT Start Time 1434  -      OT Stop Time 1514  -MC      OT Time Calculation (min) 40 min  -MC      OT Received On 01/03/23  -      OT Goal Re-Cert Due Date 01/16/23  -         Untimed Charges    OT Eval/Re-eval Minutes 40  -MC         Total Minutes    Untimed Charges Total Minutes 40  -MC       Total Minutes 40  -MC            User Key  (r) = Recorded By, (t) = Taken By, (c) = Cosigned By    Initials Name Provider Type    Michelle Candelario OT Occupational Therapist              Therapy Charges for Today     Code Description Service Date Service Provider Modifiers Qty    [id number] HC OT EVAL MOD COMPLEXITY 3 1/3/2023 Michelle Padilla OT GO 1               Michelle Padilla OT  1/3/2023

## 2023-01-03 NOTE — PROGRESS NOTES
[facility] Medicine Services  INPATIENT PROGRESS NOTE    Length of Stay: 0  Date of Admission: 1/2/2023  Primary Care Physician: Provider, No Known    Subjective   Chief Complaint: No new complaints/changes.    HPI: Patient is seen for follow-up today 1/3/2023.  He is a 67 y.o. male with history of end-stage renal disease (on hemodialysis Mondays, Wednesdays and Fridays), diabetes mellitus, hypertension, dyslipidemia who was admitted for acute metabolic encephalopathy/sepsis secondary to acute cystitis.     He is doing better, remains deconditioned, remains afebrile and voices no new complaints.  His mental status remains at his baseline.    Review of Systems   Constitutional: Positive for activity change and fatigue. Negative for appetite change, chills, diaphoresis and fever.   HENT: Negative for trouble swallowing and voice change.    Eyes: Negative for photophobia and visual disturbance.   Respiratory: Negative for cough, choking, chest tightness, shortness of breath, wheezing and stridor.    Cardiovascular: Negative for chest pain, palpitations and leg swelling.   Gastrointestinal: Negative for abdominal distention, abdominal pain, blood in stool, constipation, diarrhea, nausea and vomiting.   Endocrine: Negative for cold intolerance, heat intolerance, polydipsia, polyphagia and polyuria.   Genitourinary: Positive for decreased urine volume. Negative for difficulty urinating, dysuria, enuresis, flank pain, frequency, hematuria and urgency.   Musculoskeletal: Negative for arthralgias, gait problem, myalgias, neck pain and neck stiffness.   Skin: Negative for pallor, rash and wound.   Neurological: Negative for dizziness, tremors, seizures, syncope, facial asymmetry, speech difficulty, weakness, light-headedness, numbness and headaches.   Hematological: Does not bruise/bleed easily.   Psychiatric/Behavioral: Negative for agitation, behavioral problems and confusion.        Objective    Temp:  [98.7 °F (37.1 °C)-100.4 °F (38 °C)] 99.3 °F (37.4 °C)  Heart Rate:  [78-93] 82  Resp:  [18-20] 18  BP: (134-152)/(63-65) 142/65    AM-PAC 6 Clicks Score (PT): 7 (01/03/23 0936)    Physical Exam  Vitals and nursing note reviewed.   Constitutional:       General: He is not in acute distress.     Appearance: He is well-developed. He is ill-appearing. He is not diaphoretic.      Comments: He is chronically ill looking.   HENT:      Head: Normocephalic and atraumatic.   Eyes:      General: No scleral icterus.        Right eye: No discharge.         Left eye: No discharge.      Extraocular Movements: Extraocular movements intact.      Pupils: Pupils are equal, round, and reactive to light.   Neck:      Thyroid: No thyromegaly.      Vascular: No carotid bruit or JVD.   Cardiovascular:      Rate and Rhythm: Normal rate and regular rhythm.      Heart sounds: Normal heart sounds. No murmur heard.    No friction rub. No gallop.   Pulmonary:      Effort: Pulmonary effort is normal. No respiratory distress.      Breath sounds: Normal breath sounds. No stridor. No wheezing or rales.   Chest:      Chest wall: No tenderness.   Abdominal:      General: Bowel sounds are normal. There is no distension.      Palpations: Abdomen is soft. There is no mass.      Tenderness: There is no abdominal tenderness. There is no right CVA tenderness, left CVA tenderness, guarding or rebound.      Hernia: No hernia is present.   Musculoskeletal:         General: No swelling, tenderness or deformity.      Cervical back: Normal range of motion and neck supple.      Right lower leg: No edema.      Left lower leg: No edema.   Skin:     General: Skin is warm and dry.      Coloration: Skin is not jaundiced or pale.      Findings: No bruising, erythema, lesion or rash.   Neurological:      General: No focal deficit present.      Mental Status: He is alert and oriented to person, place, and time.      Cranial Nerves: No cranial  nerve deficit.      Sensory: No sensory deficit.      Motor: No weakness or abnormal muscle tone.      Coordination: Coordination normal.      Gait: Gait normal.      Deep Tendon Reflexes: Reflexes normal.   Psychiatric:         Mood and Affect: Mood normal.         Behavior: Behavior normal.         Thought Content: Thought content normal.         Judgment: Judgment normal.           Medication Review:    Current Facility-Administered Medications:   •  acetaminophen (TYLENOL) tablet 650 mg, 650 mg, Oral, Q4H PRN **OR** acetaminophen (TYLENOL) 160 MG/5ML solution 650 mg, 650 mg, Oral, Q4H PRN **OR** acetaminophen (TYLENOL) suppository 650 mg, 650 mg, Rectal, Q4H PRN, Antwon Solis MD  •  aspirin chewable tablet 81 mg, 81 mg, Oral, Daily, Antwon Solis MD, 81 mg at 01/03/23 0800  •  atorvastatin (LIPITOR) tablet 40 mg, 40 mg, Oral, Daily, Antwon Solis MD, 40 mg at 01/03/23 0800  •  calcium carbonate (TUMS) chewable tablet 500 mg (200 mg elemental), 1 tablet, Oral, TID PRN, Antwon Solis MD  •  cefTRIAXone (ROCEPHIN) 1 g/100 mL 0.9% NS (MBP), 1 g, Intravenous, Q24H, Antwon Solis MD, Stopped at 01/02/23 1939  •  dextrose (D50W) (25 g/50 mL) IV injection 25 g, 25 g, Intravenous, Q15 Min PRN, Antwon Solis MD  •  dextrose (GLUTOSE) oral gel 15 g, 15 g, Oral, Q15 Min PRN, Antwon Solis MD  •  glucagon (human recombinant) (GLUCAGEN DIAGNOSTIC) injection 1 mg, 1 mg, Intramuscular, Q15 Min PRN, Antwon Solis MD  •  heparin (porcine) 5000 UNIT/ML injection 5,000 Units, 5,000 Units, Subcutaneous, Q12H, Antwon Solis MD, 5,000 Units at 01/03/23 0800  •  Insulin Aspart (novoLOG) injection 0-7 Units, 0-7 Units, Subcutaneous, TID AC, Antwon Solis MD, 3 Units at 01/03/23 1104  •  insulin aspart prot-insulin aspart (novoLOG 70/30) injection 15 Units, 15 Units, Subcutaneous, BID With Meals, Antwon Solis MD, 15 Units at 01/03/23 0754  •  metoclopramide (REGLAN)  tablet 5 mg, 5 mg, Oral, 4x Daily, Antwon Solis MD, 5 mg at 01/03/23 1104  •  metoprolol succinate XL (TOPROL-XL) 24 hr tablet 50 mg, 50 mg, Oral, Daily, Antwon Solis MD, 50 mg at 01/03/23 0800  •  [START ON 1/4/2023] midodrine (PROAMATINE) tablet 10 mg, 10 mg, Oral, Once per day on Mon Wed Fri, Antwon Solis MD  •  ondansetron (ZOFRAN) tablet 4 mg, 4 mg, Oral, Q6H PRN **OR** ondansetron (ZOFRAN) injection 4 mg, 4 mg, Intravenous, Q6H PRN, Antwon Solis MD  •  sodium chloride 0.9 % flush 10 mL, 10 mL, Intravenous, Q12H, Antwon Solis MD, 10 mL at 01/03/23 0801  •  sodium chloride 0.9 % flush 10 mL, 10 mL, Intravenous, PRN, Antwon Solis MD  •  sodium chloride 0.9 % infusion 40 mL, 40 mL, Intravenous, PRN, Antwon Solis MD  •  sodium chloride 0.9 % infusion, 50 mL/hr, Intravenous, Continuous, Antwon Solis MD, Last Rate: 50 mL/hr at 01/03/23 0936, 50 mL/hr at 01/03/23 0936  •  traZODone (DESYREL) tablet 50 mg, 50 mg, Oral, Nightly, Antwon Solis MD, 50 mg at 01/02/23 2038    Results Review:  I have reviewed the labs, radiology results, and diagnostic studies.    Laboratory Data:   Results from last 7 days   Lab Units 01/03/23  0818 01/02/23  1538 12/29/22  0611   SODIUM mmol/L 136 138 135*   POTASSIUM mmol/L 4.0 4.1 4.0   CHLORIDE mmol/L 95* 95* 91*   CO2 mmol/L 27.0 27.0 30.0*   BUN mg/dL 52* 40* 39*   CREATININE mg/dL 7.11* 5.90* 5.74*   GLUCOSE mg/dL 207* 144* 260*   CALCIUM mg/dL 9.6 9.5 9.8   BILIRUBIN mg/dL  --  0.3  --    ALK PHOS U/L  --  104  --    ALT (SGPT) U/L  --  16  --    AST (SGOT) U/L  --  20  --    ANION GAP mmol/L 14.0 16.0* 14.0     Estimated Creatinine Clearance: 12.2 mL/min (A) (by C-G formula based on SCr of 7.11 mg/dL (H)).  Results from last 7 days   Lab Units 12/29/22  0611 12/28/22  0529   MAGNESIUM mg/dL 1.7 2.0         Results from last 7 days   Lab Units 01/03/23  0818 01/02/23  1538 12/29/22  0611 12/28/22  0529   WBC 10*3/mm3  10.32 12.86* 15.13* 15.00*   HEMOGLOBIN g/dL 9.1* 9.6* 10.7* 10.9*   HEMATOCRIT % 28.9* 30.1* 34.1* 34.3*   PLATELETS 10*3/mm3 170 195 187 171           Culture Data:   No results found for: BLOODCX  No results found for: URINECX  No results found for: RESPCX  No results found for: WOUNDCX  No results found for: STOOLCX  No components found for: BODYFLD    Radiology Data:   Imaging Results (Last 24 Hours)     ** No results found for the last 24 hours. **          I have reviewed the patient's current medications.     Assessment/Plan     Hospital Problem List:  Principal Problem:    Acute metabolic encephalopathy    Acute metabolic encephalopathy/sepsis (likely secondary to acute cystitis): Patient's mental status remains at baseline.  Continue IV antibiotics and gentle IV hydration.  Leukocytosis and lactic acidosis have resolved and blood culture results are pending.       End-stage renal disease: Patient is to continue hemodialysis on his regularly scheduled days.  Nephrology is following.     Diabetes mellitus: Stable.  Continue basal insulin with Accu-Cheks and sliding scale insulin.    Deconditioning: Consult PT and OT.     Continue GI and DVT prophylaxis    Discharge Planning: In progress.    I confirmed that the patient's Advance Care Plan is present, code status is documented, or surrogate decision maker is listed in the patient's medical record.     I have utilized all available immediate resources to obtain, update, or review the patient's current medications      Antwon Solis MD   01/03/23   11:12 CST

## 2023-01-03 NOTE — PLAN OF CARE
Goal Outcome Evaluation:  Plan of Care Reviewed With: patient           Outcome Evaluation: Initial PT evaluation complete, co-evaluation with OT.  Patient is alert, cooperative, flat affect.  He requires min Ax1 with bed mobility, CGA with transfers and gait.  Patient ambulates 10'x1 with FWW, stooped posture, decreased foot clearance and step length.  Patient ambulates 10'x1 without FWW (upon his request), with worsening gait mechanics.  Patient encouraged to utilize FWW.  He reports he has an old standard walker at home.  He will likely need a FWW with 5\" wheels upon discharge home with daughter.  He would also benefit from HHPT to improve his activity tolerance and gait mechanics, and to lower his fall risk.  Goals established, continue skilled I/P PT.

## 2023-01-03 NOTE — CONSULTS
Adult Nutrition  Assessment/PES    Patient Name:  Austin Egan  YOB: 1955  MRN: [id number]  Admit Date:  1/2/2023    Assessment Date:  1/3/2023    Comments:  Pt admitted jwith Acute Metabolic Encephalopathy due to Acute cystitis--receiving abx and gentle hydration.  Hx of ESRD requiring hemodialysis.  He reports a good appetite and denies any hx of wt loss.  He does have some chewing difficulties and is requesting that his meat be chopped. Other that his chewing difficulties his nutrition appears appropriate for condition at this time.   Will modify diet order and continue to monitor po and POC     Reason for Assessment     Row Name 01/03/23 1302          Reason for Assessment    Reason For Assessment identified at risk by screening criteria     Diagnosis infection/sepsis     Identified At Risk by Screening Criteria MST SCORE 2+                Nutrition/Diet History     Row Name 01/03/23 1306          Nutrition/Diet History    Typical Intake (Food/Fluid/EN/PN) Pt sleepy.  I assisted him with tray set up.  He reports a good appetite.  Denies any hx of wt loss.  Does have chewing difficulties.  Would like to have chopped meat.                Labs/Tests/Procedures/Meds     Row Name 01/03/23 1308          Labs/Procedures/Meds    Lab Results Reviewed reviewed, pertinent     Lab Results Comments Gluc 207; Bun 52; Creat 7.11; Alb 3.4        Diagnostic Tests/Procedures    Diagnostic Test/Procedure Reviewed reviewed, pertinent     Diagnostic Test/Procedures Comments IV Abx;        Medications    Pertinent Medications Reviewed reviewed, pertinent     Pertinent Medications Comments Rocephin; SSI; Reglan; Nov 70/30; NaCl 50cc/hr                  Estimated/Assessed Needs - Anthropometrics     Row Name 01/03/23 1321 01/03/23 0533       Anthropometrics    Weight -- 85.3 kg (188 lb)    Height for Calculation 1.803 m (5' 11\") --    Weight for Calculation 85.3 kg (188 lb) --       Estimated/Assessed Needs     Additional Documentation Additional Requirements (Group);Fluid Requirements (Group);Modified [place] Equation (Group);Estimated Calorie Needs (Group);KCAL/KG (Group);Protein Requirements (Group);[place]-St. Jeor Equation (Group) --       Estimated Calorie Needs    Estimated Calorie Need Method [place]-St Jeor --       [place]-St. Jeor Equation    RMR ([place]-St. Jeor Equation) 1649.631 --    [place]-St. Jeor Activity Factors 1.4 - 1.5 --    Activity Factors ([place]-St. Jeor) 6810.0512 - 7832.3532 --       Protein Requirements    Weight Used For Protein Calculations 78 kg (172 lb) --    Est Protein Requirement Amount (gms/kg) 1.2 gm protein --    Estimated Protein Requirements (gms/day) 93.62 --       Fluid Requirements    Fluid Requirements (mL/day) 1200 --    Estimated Fluid Requirement Method other (see comments)  dialysis pt --    RDA Method (mL) 1200 --               Nutrition Prescription Ordered     Row Name 01/03/23 1324          Nutrition Prescription PO    Current PO Diet Regular     Common Modifiers Cardiac;Consistent Carbohydrate;Renal                Evaluation of Received Nutrient/Fluid Intake     Row Name 01/03/23 1325          PO Evaluation    Number of Days PO Intake Evaluated Insufficient Data                   Problem/Interventions:   Problem 1     Row Name 01/03/23 1326          Nutrition Diagnoses Problem 1    Problem 1 Biting/Chewing Difficulty     Etiology (related to) Functional Diagnosis     Functional Diagnosis Chewing deficit     Appetite Good     Signs/Symptoms (evidenced by) Report/Observation     Reported/Observed By Patient                      Intervention Goal     Row Name 01/03/23 1327          Intervention Goal    General Meet nutritional needs for age/condition;Reduce/improve symptoms     PO Tolerate PO;Increase intake;Meet estimated needs     Weight Maintain weight                Nutrition Intervention     Row Name 01/03/23 1328          Nutrition Intervention    RD/Tech  Action Follow Tx progress;Care plan reviewd;Encourage intake;Advise alternate selection;Advise available snack;Interview for preference;Menu provided;Recommend/ordered     Recommended/Ordered Diet                Nutrition Prescription     Row Name 01/03/23 1329          Nutrition Prescription PO    PO Prescription Begin/change diet     Begin/Change Diet to Soft Texture     Texture Chopped     Fluid Consistency Thin     Common Modifiers Cardiac;Consistent Carbohydrate;Renal     New PO Prescription Ordered? Yes                Education/Evaluation     Row Name 01/03/23 1320          Education    Education Education topics;Advised regarding habits/behavior     Education Topics Basic nutrition;Protein     Advised Regarding Habits/Behavior Increased nutrient density;Food choices;Snacks        Monitor/Evaluation    Monitor Per protocol;I&O;PO intake;Supplement intake;Pertinent labs;Weight;Skin status;Symptoms                 Electronically signed by:  Veronique Rose RD  01/03/23 13:32 CST

## 2023-01-03 NOTE — PLAN OF CARE
Goal Outcome Evaluation:  Plan of Care Reviewed With: caregiver, patient        Progress: no change  Outcome Evaluation: Nutrition New Assessment:  Pt reports a good appetite.  Denies any GI distress.  EAting well pta.  Does have some chewing difficulties--will modify texture.  Other than that, his nturition appears appropriate for condition.

## 2023-01-03 NOTE — PLAN OF CARE
Goal Outcome Evaluation:  Plan of Care Reviewed With: patient        Progress: no change  Outcome Evaluation: OT eval completed, co-eval with PT. Pt fowlers in bed and agreeable to the session. Pt presents with a flat affect. Pt min A for sup<>sit and sit<>sup with HOB elevated, the use of bed rails, and VCs. Pt SBA for donning/doffing socks while sitting EOB. Pt BUE MMT 4-/5 grossly. Pt CGA for sit to stand t/f with RW. Pt CGA for toilet t/f with raised toilet seat. Pt CGA for fxnl mobility with RW. Pt demonstrates decreased fxnl mobility, endurance, and independence in ADLs. Pt would benefit from skilled OT services. Recommend d/c home with assist, HHOT.

## 2023-01-03 NOTE — THERAPY EVALUATION
Patient Name: Austin Egan  : 1955    MRN: [id number]                              Today's Date: 1/3/2023       Admit Date: 2023    Visit Dx:     ICD-10-CM ICD-9-CM   1. Impaired mobility and ADLs  Z74.09 V49.89    Z78.9    2. Impaired functional mobility, balance, gait, and endurance  Z74.09 V49.89     Patient Active Problem List   Diagnosis   • Confusion and disorientation   • Acute metabolic encephalopathy     Past Medical History:   Diagnosis Date   • Atrial fibrillation (HCC)    • Blindness    • Diabetes mellitus (HCC)    • Elevated cholesterol    • Hypertension    • Kidney disease    • Renal failure      No past surgical history on file.   General Information     Row Name 23 1435          Physical Therapy Time and Intention    Document Type evaluation  -CZ     Mode of Treatment physical therapy;occupational therapy  -CZ     Row Name 23 1435          General Information    Patient Profile Reviewed yes  -CZ     Prior Level of Function independent:;all household mobility  -CZ     Row Name 23 1435          Home Main Entrance    Stair Railings, Main Entrance none  -CZ     Row Name 23 1435          Stairs Within Home, Primary    Stairs, Within Home, Primary Ambulates with SW or SPC.  -CZ     Number of Stairs, Within Home, Primary none  -CZ     Row Name 23 1435          Cognition    Orientation Status (Cognition) oriented x 3;oriented to;place;verbal cues/prompts needed for orientation  -CZ           User Key  (r) = Recorded By, (t) = Taken By, (c) = Cosigned By    Initials Name Provider Type    CZ John Paul Thakur, PT Physical Therapist               Mobility     Row Name 23 1435          Bed Mobility    Bed Mobility supine-sit;sit-supine  -CZ     Supine-Sit [place] (Bed Mobility) minimum assist (75% patient effort)  -CZ     Sit-Supine [place] (Bed Mobility) minimum assist (75% patient effort)  -CZ     Assistive Device (Bed Mobility) head of bed  elevated;bed rails  -CZ     Row Name 01/03/23 1435          Sit-Stand Transfer    Sit-Stand [place] (Transfers) contact guard  -CZ     Assistive Device (Sit-Stand Transfers) walker, front-wheeled  -CZ     Row Name 01/03/23 1435          Gait/Stairs (Locomotion)    [place] Level (Gait) contact guard  -CZ     Assistive Device (Gait) walker, front-wheeled  -CZ     Distance in Feet (Gait) 10'x2.  -CZ     Comment, (Gait/Stairs) Slow jacques, stooped posture, decreased foot clearance and step legth, some difficulty initiating movement.  -CZ           User Key  (r) = Recorded By, (t) = Taken By, (c) = Cosigned By    Initials Name Provider Type    CZ John Paul Thakur, PT Physical Therapist               Obj/Interventions     Row Name 01/03/23 1435          Range of Motion Comprehensive    General Range of Motion bilateral lower extremity ROM WFL  -CZ     Row Name 01/03/23 1435          Strength Comprehensive (MMT)    Comment, General Manual Muscle Testing (MMT) Assessment BLEs: 4-/5 grossly.  -CZ     Row Name 01/03/23 1435          Sensory Assessment (Somatosensory)    Sensory Assessment (Somatosensory) LE sensation intact  -CZ           User Key  (r) = Recorded By, (t) = Taken By, (c) = Cosigned By    Initials Name Provider Type    CZ John Paul Thakur, PT Physical Therapist               Goals/Plan     Row Name 01/03/23 1435          Bed Mobility Goal 1 (PT)    Activity/Assistive Device (Bed Mobility Goal 1, PT) sit to supine/supine to sit  -CZ     [place] Level/Cues Needed (Bed Mobility Goal 1, PT) independent  -CZ     Time Frame (Bed Mobility Goal 1, PT) by discharge  -CZ     Strategies/Barriers (Bed Mobility Goal 1, PT) HOB flat, no bed rails.  -CZ     Progress/Outcomes (Bed Mobility Goal 1, PT) goal not met  -CZ     Row Name 01/03/23 1435          Transfer Goal 1 (PT)    Activity/Assistive Device (Transfer Goal 1, PT) sit-to-stand/stand-to-sit;bed-to-chair/chair-to-bed;walker, rolling  -CZ      [place] Level/Cues Needed (Transfer Goal 1, PT) supervision required  -CZ     Time Frame (Transfer Goal 1, PT) by discharge  -CZ     Strategies/Barriers (Transfers Goal 1, PT) Facilitate upright posture.  -CZ     Progress/Outcome (Transfer Goal 1, PT) goal not met  -CZ     Row Name 01/03/23 1435          Gait Training Goal 1 (PT)    Activity/Assistive Device (Gait Training Goal 1, PT) walker, rolling  -CZ     [place] Level (Gait Training Goal 1, PT) supervision required  -CZ     Distance (Gait Training Goal 1, PT) 150'x1.  -CZ     Time Frame (Gait Training Goal 1, PT) by discharge  -CZ     Strategies/Barriers (Gait Training Goal 1, PT) Some difficulty initiating movement.  -CZ     Progress/Outcome (Gait Training Goal 1, PT) goal not met  -CZ     Row Name 01/03/23 1435          Problem Specific Goal 1 (PT)    Problem Specific Goal 1 (PT) Score 24/28 on Tinetti fall risk assessment.  -CZ     Time Frame (Problem Specific Goal 1, PT) by discharge  -CZ     Strategies/Barriers (Problem Specific Goal 1, PT) Some difficulty initiating movement.  -CZ     Progress/Outcome (Problem Specific Goal 1, PT) goal not met  -CZ     Row Name 01/03/23 1435          Therapy Assessment/Plan (PT)    Planned Therapy Interventions (PT) balance training;bed mobility training;gait training;patient/family education;transfer training;strengthening;stretching;home exercise program  -CZ           User Key  (r) = Recorded By, (t) = Taken By, (c) = Cosigned By    Initials Name Provider Type    CZ John Paul Thakur, PT Physical Therapist               Clinical Impression     Row Name 01/03/23 1435          Pain    Pretreatment Pain Rating 0/10 - no pain  -CZ     Posttreatment Pain Rating 0/10 - no pain  -CZ     Row Name 01/03/23 1435          Plan of Care Review    Plan of Care Reviewed With patient  -CZ     Outcome Evaluation Initial PT evaluation complete, co-evaluation with OT.  Patient is alert, cooperative, flat affect.  He requires min  Ax1 with bed mobility, CGA with transfers and gait.  Patient ambulates 10'x1 with FWW, stooped posture, decreased foot clearance and step length.  Patient ambulates 10'x1 without FWW (upon his request), with worsening gait mechanics.  Patient encouraged to utilize FWW.  He reports he has an old standard walker at home.  He will likely need a FWW with 5\" wheels upon discharge home with daughter.  He would also benefit from HHPT to improve his activity tolerance and gait mechanics, and to lower his fall risk.  Goals established, continue skilled I/P PT.  -CZ     Row Name 01/03/23 1435          Therapy Assessment/Plan (PT)    Rehab Potential (PT) good, to achieve stated therapy goals  -CZ     Criteria for Skilled Interventions Met (PT) yes;skilled treatment is necessary  -CZ     Therapy Frequency (PT) 5 times/wk  -CZ     Row Name 01/03/23 1435          Vital Signs    Pre Systolic BP Rehab 112  -CZ     Pre Treatment Diastolic BP 56  -CZ     Pretreatment Heart Rate (beats/min) 71  -CZ     Pre SpO2 (%) 93  -CZ     O2 Delivery Pre Treatment room air  -CZ     Pre Patient Position Supine  -CZ     Row Name 01/03/23 1435          Positioning and Restraints    Pre-Treatment Position in bed  -CZ     Post Treatment Position bed  -CZ     In Bed supine;call light within reach;encouraged to call for assist;exit alarm on  -CZ           User Key  (r) = Recorded By, (t) = Taken By, (c) = Cosigned By    Initials Name Provider Type    CZ John Paul Thakur, PT Physical Therapist               Outcome Measures     Row Name 01/03/23 1435 01/03/23 0936       How much help from another person do you currently need...    Turning from your back to your side while in flat bed without using bedrails? 3  -CZ 1  -MS    Moving from lying on back to sitting on the side of a flat bed without bedrails? 3  -CZ 1  -MS    Moving to and from a bed to a chair (including a wheelchair)? 3  -CZ 1  -MS    Standing up from a chair using your arms (e.g.,  wheelchair, bedside chair)? 3  -CZ 1  -MS    Climbing 3-5 steps with a railing? 2  -CZ 1  -MS    To walk in hospital room? 3  -CZ 2  -MS    AM-PAC 6 Clicks Score (PT) 17  -CZ 7  -MS    Highest level of mobility 5 --> Static standing  -CZ 2 --> Bed activities/dependent transfer  -MS    Row Name 01/03/23 1435 01/03/23 1434       Functional Assessment    Outcome Measure Options AM-PAC 6 Clicks Basic Mobility (PT)  - AM-PAC 6 Clicks Daily Activity (OT)  -          User Key  (r) = Recorded By, (t) = Taken By, (c) = Cosigned By    Initials Name Provider Type    CZ John Paul Thakur, PT Physical Therapist    Liya Rosales, RN Registered Nurse    Michelle Candelario, OT Occupational Therapist                             Physical Therapy Education     Title: PT OT SLP Therapies (In Progress)     Topic: Physical Therapy (In Progress)     Point: Mobility training (Done)     Learning Progress Summary           Patient Acceptance, E, VU,NR by  at 1/3/2023 1513    Comment: PT POC, hand placement with transfers, use of walker.                   Point: Home exercise program (Not Started)     Learner Progress:  Not documented in this visit.          Point: Body mechanics (Not Started)     Learner Progress:  Not documented in this visit.          Point: Precautions (Not Started)     Learner Progress:  Not documented in this visit.                      User Key     Initials Effective Dates Name Provider Type Discipline     09/18/22 -  John Paul Thakur, PT Physical Therapist PT              PT Recommendation and Plan  Planned Therapy Interventions (PT): balance training, bed mobility training, gait training, patient/family education, transfer training, strengthening, stretching, home exercise program  Plan of Care Reviewed With: patient  Outcome Evaluation: Initial PT evaluation complete, co-evaluation with OT.  Patient is alert, cooperative, flat affect.  He requires min Ax1 with bed mobility, CGA with transfers and  gait.  Patient ambulates 10'x1 with FWW, stooped posture, decreased foot clearance and step length.  Patient ambulates 10'x1 without FWW (upon his request), with worsening gait mechanics.  Patient encouraged to utilize FWW.  He reports he has an old standard walker at home.  He will likely need a FWW with 5\" wheels upon discharge home with daughter.  He would also benefit from HHPT to improve his activity tolerance and gait mechanics, and to lower his fall risk.  Goals established, continue skilled I/P PT.     Time Calculation:    PT Charges     Row Name 01/03/23 1538             Time Calculation    Start Time 1435  -CZ      Stop Time 1518  -CZ      Time Calculation (min) 43 min  -CZ      PT Received On 01/03/23  -CZ      PT Goal Re-Cert Due Date 01/16/23  -CZ         Untimed Charges    PT Eval/Re-eval Minutes 43  -CZ         Total Minutes    Untimed Charges Total Minutes 43  -CZ       Total Minutes 43  -CZ            User Key  (r) = Recorded By, (t) = Taken By, (c) = Cosigned By    Initials Name Provider Type    CZ John Paul Thakur, PT Physical Therapist              Therapy Charges for Today     Code Description Service Date Service Provider Modifiers Qty    95248842940 HC PT EVAL MOD COMPLEXITY 3 1/3/2023 John Paul Thakur, PT GP 1          PT G-Codes  Outcome Measure Options: AM-PAC 6 Clicks Basic Mobility (PT)  AM-PAC 6 Clicks Score (PT): 17  AM-PAC 6 Clicks Score (OT): 23  PT Discharge Summary  Anticipated Discharge Disposition (PT): home with assist, home with home health    John Paul Thakur, NORBERT  1/3/2023

## 2023-01-03 NOTE — DISCHARGE PLACEMENT REQUEST
Patient is here under OBV status at this time.   Please call Zurdo BRITT RN, CM at [phone] with any questions.     JigneshTanja mota (67 y.o. Male)     Date of Birth   1955    Social Security Number       Address   [place] [address]    Home Phone   [phone]    MRN   [id number]       Yarsani   Jew    Marital Status   Single                            Admission Date   1/2/23    Admission Type   Urgent    Admitting Provider   Antwon Solis MD    Attending Provider   Antwon Solis MD    Department, Room/Bed   [address], 369/1       Discharge Date       Discharge Disposition       Discharge Destination                               Attending Provider: Antwon Solis MD    Allergies: No Known Allergies    Isolation: None   Infection: None   Code Status: Prior    Ht: 180.3 cm (70.98\")   Wt: 85.3 kg (188 lb)    Admission Cmt: None   Principal Problem: Acute metabolic encephalopathy [G93.41]                 Active Insurance as of 1/2/2023     Primary Coverage     Payor Plan Insurance Group Employer/Plan Group    [facility] [employer] MEDICARE REPLACEMENT [id number]     Payor Plan Address Payor Plan Phone Number Payor Plan Fax Number Effective Dates    [address] [address] [phone]  12/1/2022 - None Entered    [facility] [id number]       Subscriber Name Subscriber Birth Date Member ID       TANJA DREW 1955 [id number]           Secondary Coverage     Payor Plan Insurance Group Employer/Plan Group    [place]      Payor Plan Address Payor Plan Phone Number Payor Plan Fax Number Effective Dates    [address] [phone]  12/26/2022 - None Entered    [facility] [id number]       Subscriber Name Subscriber Birth Date Member ID       TANJA DREW 1955 [id number]                 Emergency Contacts      (Rel.) Home Phone Work Phone Mobile Phone     Mamie Kraft (Daughter) [phone] -- [phone]               History & Physical      Echendu, Antwon BUTCHER MD at 01/02/23 1504                [facility]  INPATIENT HISTORY AND PHYSICAL       Patient Care Team:  Provider, No Known as PCP - General    Date of Admission: 1/2/2023    Primary Care Physician: Provider, No Known    Chief complaint: Decreased level of consciousness.    Subjective     Patient is a 67 y.o. male with history of end-stage renal disease (on hemodialysis Mondays, Wednesdays and Fridays), diabetes mellitus, hypertension, dyslipidemia who was transferred as a direct admission from [facility] emergency department to [facility] [place] secondary to mental status change arising from sepsis.   Patient was reportedly undergoing hemodialysis today when he developed mental status change with temperature of 101.   There was no reported chest pain, shortness of air, nausea, vomiting, headaches, blurry vision, dysuria or hematuria.    At [facility] emergency department, he had noncontrast CT scan of the head which did not show any acute changes.  His chest x-ray was clear, COVID-19 PCR was negative, lactic acid was 2.6, WBC 18,000 and urinalysis showed evidence of urinary tract infection.  Patient had reportedly had urine culture done a few days prior which showed gram-negative rods.    Patient was discharged from this hospital on 12/29/2022 following a 3-day admission for for mental status change/confusion.      Review of Systems   Constitutional: Positive for activity change, appetite change, fatigue and fever. Negative for chills and diaphoresis.   HENT: Negative for trouble swallowing and voice change.    Eyes: Negative for photophobia and visual disturbance.   Respiratory: Negative for cough, choking, chest tightness, shortness of breath, wheezing and stridor.    Cardiovascular: Negative for chest pain, palpitations and leg swelling.    Gastrointestinal: Negative for abdominal distention, abdominal pain, blood in stool, constipation, diarrhea, nausea and vomiting.   Endocrine: Negative for cold intolerance, heat intolerance, polydipsia, polyphagia and polyuria.   Genitourinary: Positive for decreased urine volume. Negative for difficulty urinating, dysuria, enuresis, flank pain, frequency, hematuria and urgency.   Musculoskeletal: Negative for arthralgias, gait problem, myalgias, neck pain and neck stiffness.   Skin: Negative for pallor, rash and wound.   Neurological: Negative for dizziness, tremors, seizures, syncope, facial asymmetry, speech difficulty, weakness, light-headedness, numbness and headaches.   Hematological: Does not bruise/bleed easily.   Psychiatric/Behavioral: Positive for confusion. Negative for agitation and behavioral problems.         History  Past Medical History:   Diagnosis Date   • Atrial fibrillation (HCC)    • Blindness    • Diabetes mellitus (HCC)    • Elevated cholesterol    • Hypertension    • Kidney disease    • Renal failure      No past surgical history on file.  Family History   Problem Relation Age of Onset   • Cancer Mother    • Hyperlipidemia Other    • Kidney disease Other    • Hypertension Other    • Diabetes Other      Social History     Tobacco Use   • Smoking status: Never   • Smokeless tobacco: Never   Substance Use Topics   • Alcohol use: Never   • Drug use: Never     Medications Prior to Admission   Medication Sig Dispense Refill Last Dose   • acetaminophen (TYLENOL) 500 MG tablet Take 500 mg by mouth Every 6 (Six) Hours As Needed for Mild Pain (2 capsule daily).      • aspirin 81 MG chewable tablet Chew 81 mg Daily.      • atorvastatin (LIPITOR) 40 MG tablet Take 1 tablet by mouth Daily for 30 days. 30 tablet 0    • Ferric Citrate (Auryxia) 1  MG(Fe) tablet Take 210 mg by mouth 3 (Three) Times a Day. 2 tablets tid      • insulin aspart protamine-insulin aspart (novoLOG 70/30) injection Inject  15 Units under the skin into the appropriate area as directed 2 (Two) Times a Day With Meals.      • Melatonin 10 MG capsule Take 30 mg by mouth Every Night.      • metoclopramide (REGLAN) 5 MG tablet Take 5 mg by mouth 4 (Four) Times a Day.      • metoprolol succinate XL (TOPROL-XL) 50 MG 24 hr tablet Take 50 mg by mouth Daily.      • midodrine (PROAMATINE) 10 MG tablet Take 10 mg by mouth 3 (Three) Times a Week. 30 minutes before dialysis      • Multiple Vitamins-Minerals (PRESERVISION AREDS 2 PO) Take 2 tablet/day by mouth Daily.      • multivitamin with minerals tablet tablet Take 1 tablet by mouth Daily.      • SEVELAMER HCL PO Take 800 mg by mouth Daily With Dinner.      • simvastatin (ZOCOR) 80 MG tablet Take 80 mg by mouth Every Night.      • traZODone (DESYREL) 50 MG tablet Take 50 mg by mouth Every Night.      • vitamin D (ERGOCALCIFEROL) 1.25 MG (65480 UT) capsule capsule Take 50,000 Units by mouth Every 7 (Seven) Days.        Allergies:  Patient has no known allergies.  Prior to Admission medications    Medication Sig Start Date End Date Taking? Authorizing Provider   acetaminophen (TYLENOL) 500 MG tablet Take 500 mg by mouth Every 6 (Six) Hours As Needed for Mild Pain (2 capsule daily).    Dwaine Valentin MD   aspirin 81 MG chewable tablet Chew 81 mg Daily.    Dwaine Valentin MD   atorvastatin (LIPITOR) 40 MG tablet Take 1 tablet by mouth Daily for 30 days. 12/29/22 1/28/23  Clifton Davis MD   Ferric Citrate (Auryxia) 1  MG(Fe) tablet Take 210 mg by mouth 3 (Three) Times a Day. 2 tablets tid    Dwaine Valentin MD   insulin aspart protamine-insulin aspart (novoLOG 70/30) injection Inject 15 Units under the skin into the appropriate area as directed 2 (Two) Times a Day With Meals.    Dwaine Valentin MD   Melatonin 10 MG capsule Take 30 mg by mouth Every Night.    Dwaine Valentin MD   metoclopramide (REGLAN) 5 MG tablet Take 5 mg by mouth 4 (Four) Times a Day.     Dwaine Valentin MD   metoprolol succinate XL (TOPROL-XL) 50 MG 24 hr tablet Take 50 mg by mouth Daily.    Dwaine Valentin MD   midodrine (PROAMATINE) 10 MG tablet Take 10 mg by mouth 3 (Three) Times a Week. 30 minutes before dialysis    Dwaine Valentin MD   Multiple Vitamins-Minerals (PRESERVISION AREDS 2 PO) Take 2 tablet/day by mouth Daily.    Dwaine Valentin MD   multivitamin with minerals tablet tablet Take 1 tablet by mouth Daily.    Dwaine Valentin MD   SEVELAMER HCL PO Take 800 mg by mouth Daily With Dinner.    Dwaine Valentin MD   simvastatin (ZOCOR) 80 MG tablet Take 80 mg by mouth Every Night.    Dwaine Valentin MD   traZODone (DESYREL) 50 MG tablet Take 50 mg by mouth Every Night.    Dwaine Valentin MD   vitamin D (ERGOCALCIFEROL) 1.25 MG (99202 UT) capsule capsule Take 50,000 Units by mouth Every 7 (Seven) Days. 12/28/22   Dwaine Valentni MD       Objective        Vital Signs  Temp:  [100.4 °F (38 °C)] 100.4 °F (38 °C)  Heart Rate:  [87] 87  Resp:  [20] 20  BP: (138)/(63) 138/63      Physical Exam  Vitals and nursing note reviewed.   Constitutional:       General: He is not in acute distress.     Appearance: He is well-developed. He is ill-appearing. He is not diaphoretic.      Comments: Patient is chronically ill looking.   HENT:      Head: Normocephalic and atraumatic.   Eyes:      General: No scleral icterus.        Right eye: No discharge.         Left eye: No discharge.      Extraocular Movements: Extraocular movements intact.      Pupils: Pupils are equal, round, and reactive to light.   Neck:      Thyroid: No thyromegaly.      Vascular: No carotid bruit or JVD.   Cardiovascular:      Rate and Rhythm: Normal rate and regular rhythm.      Heart sounds: Normal heart sounds. No murmur heard.    No friction rub. No gallop.   Pulmonary:      Effort: Pulmonary effort is normal. No respiratory distress.      Breath sounds: Normal breath sounds. No  stridor. No wheezing or rales.   Chest:      Chest wall: No tenderness.   Abdominal:      General: Bowel sounds are normal. There is no distension.      Palpations: Abdomen is soft. There is no mass.      Tenderness: There is no abdominal tenderness. There is no right CVA tenderness, left CVA tenderness, guarding or rebound.      Hernia: No hernia is present.   Genitourinary:     Comments: Indwelling Suero catheter is in place.  Musculoskeletal:         General: No swelling, tenderness or deformity.      Cervical back: Normal range of motion and neck supple.      Right lower leg: No edema.      Left lower leg: No edema.   Skin:     General: Skin is warm and dry.      Coloration: Skin is not jaundiced or pale.      Findings: No bruising, erythema, lesion or rash.   Neurological:      General: No focal deficit present.      Mental Status: He is alert and oriented to person, place, and time.      Cranial Nerves: No cranial nerve deficit.      Sensory: No sensory deficit.      Motor: No weakness or abnormal muscle tone.      Coordination: Coordination normal.      Gait: Gait normal.      Deep Tendon Reflexes: Reflexes normal.   Psychiatric:         Mood and Affect: Mood normal.         Behavior: Behavior normal.         Thought Content: Thought content normal.         Judgment: Judgment normal.           Results Review:     Results from last 7 days   Lab Units 12/29/22  0611 12/28/22  0529 12/27/22  0607 12/26/22  1855   SODIUM mmol/L 135* 138 139 138   POTASSIUM mmol/L 4.0 4.5 4.7 4.6   CHLORIDE mmol/L 91* 96* 96* 94*   CO2 mmol/L 30.0* 28.0 30.0* 29.0   BUN mg/dL 39* 60* 49* 44*   CREATININE mg/dL 5.74* 7.71* 5.90* 5.72*   GLUCOSE mg/dL 260* 202* 198* 230*   CALCIUM mg/dL 9.8 9.8 9.5 9.5   BILIRUBIN mg/dL  --   --   --  0.4   ALK PHOS U/L  --   --   --  138*   ALT (SGPT) U/L  --   --   --  20   AST (SGOT) U/L  --   --   --  19       Results from last 7 days   Lab Units 12/29/22  0611 12/28/22 0529 12/27/22 0607    MAGNESIUM mg/dL 1.7 2.0 1.9       Results from last 7 days   Lab Units 12/29/22  0611 12/28/22  0529 12/27/22  0607 12/26/22  1855   WBC 10*3/mm3 15.13* 15.00* 8.41 11.69*   HEMOGLOBIN g/dL 10.7* 10.9* 10.5* 10.8*   HEMATOCRIT % 34.1* 34.3* 33.9* 34.2*   PLATELETS 10*3/mm3 187 171 152 143           Imaging Results (Last 7 Days)     ** No results found for the last 168 hours. **          Assessment / Plan       Hospital Problem List:    Acute metabolic encephalopathy    Acute metabolic encephalopathy/sepsis (likely secondary to acute cystitis): Patient's mental status was back to normal during my assessment.  Begin IV antibiotics, gentle IV hydration and check blood cultures.  Leukocytosis and lactic acidosis are reactive and will be monitored.    End-stage renal disease: Consult nephrologist for inpatient hemodialysis on his regularly scheduled days.    Diabetes mellitus: Continue basal insulin with Accu-Cheks and sliding scale insulin.    Begin GI and DVT prophylaxis.    Additional orders and treatment plan as hospital course dictates.    I confirmed that the patient's Advance Care Plan is present, code status is documented, or surrogate decision maker is listed in the patient's medical record.     I have utilized all available immediate resources to obtain, update, or review the patient's current medications    I discussed the patient's findings and my recommendations with patient.     Antwon Solis MD  01/02/23  15:04 CST      Dictated Utilizing Dragon Dictation         Electronically signed by Antwon Solis MD at 01/02/23 8892

## 2023-01-04 LAB
ANION GAP SERPL CALCULATED.3IONS-SCNC: 14 MMOL/L (ref 5–15)
BASOPHILS # BLD AUTO: 0.05 10*3/MM3 (ref 0–0.2)
BASOPHILS NFR BLD AUTO: 0.6 % (ref 0–1.5)
BUN SERPL-MCNC: 62 MG/DL (ref 8–23)
BUN/CREAT SERPL: 7.6 (ref 7–25)
CALCIUM SPEC-SCNC: 9.1 MG/DL (ref 8.6–10.5)
CHLORIDE SERPL-SCNC: 97 MMOL/L (ref 98–107)
CO2 SERPL-SCNC: 23 MMOL/L (ref 22–29)
CREAT SERPL-MCNC: 8.17 MG/DL (ref 0.76–1.27)
DEPRECATED RDW RBC AUTO: 50.6 FL (ref 37–54)
EGFRCR SERPLBLD CKD-EPI 2021: 6.6 ML/MIN/1.73
EOSINOPHIL # BLD AUTO: 0.3 10*3/MM3 (ref 0–0.4)
EOSINOPHIL NFR BLD AUTO: 3.6 % (ref 0.3–6.2)
ERYTHROCYTE [DISTWIDTH] IN BLOOD BY AUTOMATED COUNT: 16.1 % (ref 12.3–15.4)
GLUCOSE BLDC GLUCOMTR-MCNC: 173 MG/DL (ref 70–130)
GLUCOSE BLDC GLUCOMTR-MCNC: 222 MG/DL (ref 70–130)
GLUCOSE BLDC GLUCOMTR-MCNC: 251 MG/DL (ref 70–130)
GLUCOSE SERPL-MCNC: 162 MG/DL (ref 65–99)
HCT VFR BLD AUTO: 26.9 % (ref 37.5–51)
HGB BLD-MCNC: 8.8 G/DL (ref 13–17.7)
IMM GRANULOCYTES # BLD AUTO: 0.07 10*3/MM3 (ref 0–0.05)
IMM GRANULOCYTES NFR BLD AUTO: 0.8 % (ref 0–0.5)
LYMPHOCYTES # BLD AUTO: 1.65 10*3/MM3 (ref 0.7–3.1)
LYMPHOCYTES NFR BLD AUTO: 20 % (ref 19.6–45.3)
MCH RBC QN AUTO: 28.3 PG (ref 26.6–33)
MCHC RBC AUTO-ENTMCNC: 32.7 G/DL (ref 31.5–35.7)
MCV RBC AUTO: 86.5 FL (ref 79–97)
MONOCYTES # BLD AUTO: 1.61 10*3/MM3 (ref 0.1–0.9)
MONOCYTES NFR BLD AUTO: 19.5 % (ref 5–12)
NEUTROPHILS NFR BLD AUTO: 4.59 10*3/MM3 (ref 1.7–7)
NEUTROPHILS NFR BLD AUTO: 55.5 % (ref 42.7–76)
NRBC BLD AUTO-RTO: 0 /100 WBC (ref 0–0.2)
PLATELET # BLD AUTO: 194 10*3/MM3 (ref 140–450)
PMV BLD AUTO: 9.6 FL (ref 6–12)
POTASSIUM SERPL-SCNC: 4.1 MMOL/L (ref 3.5–5.2)
RBC # BLD AUTO: 3.11 10*6/MM3 (ref 4.14–5.8)
SODIUM SERPL-SCNC: 134 MMOL/L (ref 136–145)
WBC NRBC COR # BLD: 8.27 10*3/MM3 (ref 3.4–10.8)

## 2023-01-04 PROCEDURE — 82962 GLUCOSE BLOOD TEST: CPT

## 2023-01-04 PROCEDURE — G0257 UNSCHED DIALYSIS ESRD PT HOS: HCPCS

## 2023-01-04 PROCEDURE — 96372 THER/PROPH/DIAG INJ SC/IM: CPT

## 2023-01-04 PROCEDURE — 97110 THERAPEUTIC EXERCISES: CPT

## 2023-01-04 PROCEDURE — 97530 THERAPEUTIC ACTIVITIES: CPT

## 2023-01-04 PROCEDURE — 25010000002 EPOETIN ALFA PER 1000 UNITS: Performed by: INTERNAL MEDICINE

## 2023-01-04 PROCEDURE — G0378 HOSPITAL OBSERVATION PER HR: HCPCS

## 2023-01-04 PROCEDURE — 63710000001 INSULIN ASPART PER 5 UNITS: Performed by: INTERNAL MEDICINE

## 2023-01-04 PROCEDURE — 25010000002 HEPARIN (PORCINE) PER 1000 UNITS: Performed by: INTERNAL MEDICINE

## 2023-01-04 PROCEDURE — 97535 SELF CARE MNGMENT TRAINING: CPT

## 2023-01-04 PROCEDURE — 96366 THER/PROPH/DIAG IV INF ADDON: CPT

## 2023-01-04 PROCEDURE — 80048 BASIC METABOLIC PNL TOTAL CA: CPT | Performed by: INTERNAL MEDICINE

## 2023-01-04 PROCEDURE — 85025 COMPLETE CBC W/AUTO DIFF WBC: CPT | Performed by: INTERNAL MEDICINE

## 2023-01-04 PROCEDURE — 25010000002 CEFTRIAXONE PER 250 MG: Performed by: INTERNAL MEDICINE

## 2023-01-04 PROCEDURE — 97116 GAIT TRAINING THERAPY: CPT

## 2023-01-04 RX ORDER — HEPARIN SODIUM 1000 [USP'U]/ML
2000 INJECTION, SOLUTION INTRAVENOUS; SUBCUTANEOUS AS NEEDED
Status: COMPLETED | OUTPATIENT
Start: 2023-01-04 | End: 2023-01-04

## 2023-01-04 RX ORDER — ALBUMIN (HUMAN) 12.5 G/50ML
12.5 SOLUTION INTRAVENOUS AS NEEDED
Status: ACTIVE | OUTPATIENT
Start: 2023-01-04 | End: 2023-01-04

## 2023-01-04 RX ORDER — LANOLIN ALCOHOL/MO/W.PET/CERES
6 CREAM (GRAM) TOPICAL NIGHTLY PRN
Status: DISCONTINUED | OUTPATIENT
Start: 2023-01-04 | End: 2023-01-05 | Stop reason: HOSPADM

## 2023-01-04 RX ADMIN — CEFTRIAXONE SODIUM 1 G: 1 INJECTION, POWDER, FOR SOLUTION INTRAMUSCULAR; INTRAVENOUS at 16:08

## 2023-01-04 RX ADMIN — HEPARIN SODIUM 5000 UNITS: 5000 INJECTION INTRAVENOUS; SUBCUTANEOUS at 07:45

## 2023-01-04 RX ADMIN — METOPROLOL SUCCINATE 50 MG: 50 TABLET, EXTENDED RELEASE ORAL at 07:44

## 2023-01-04 RX ADMIN — MIDODRINE HYDROCHLORIDE 10 MG: 5 TABLET ORAL at 07:44

## 2023-01-04 RX ADMIN — EPOETIN ALFA 6000 UNITS: 10000 SOLUTION INTRAVENOUS; SUBCUTANEOUS at 09:45

## 2023-01-04 RX ADMIN — Medication 10 ML: at 21:42

## 2023-01-04 RX ADMIN — METOCLOPRAMIDE 5 MG: 5 TABLET ORAL at 21:42

## 2023-01-04 RX ADMIN — SODIUM CHLORIDE 50 ML/HR: 9 INJECTION, SOLUTION INTRAVENOUS at 06:05

## 2023-01-04 RX ADMIN — INSULIN ASPART 2 UNITS: 100 INJECTION, SOLUTION INTRAVENOUS; SUBCUTANEOUS at 16:57

## 2023-01-04 RX ADMIN — METOCLOPRAMIDE 5 MG: 5 TABLET ORAL at 07:43

## 2023-01-04 RX ADMIN — HEPARIN SODIUM 2000 UNITS: 1000 INJECTION INTRAVENOUS; SUBCUTANEOUS at 12:25

## 2023-01-04 RX ADMIN — HEPARIN SODIUM 5000 UNITS: 5000 INJECTION INTRAVENOUS; SUBCUTANEOUS at 21:41

## 2023-01-04 RX ADMIN — INSULIN ASPART 2 UNITS: 100 INJECTION, SOLUTION INTRAVENOUS; SUBCUTANEOUS at 07:38

## 2023-01-04 RX ADMIN — INSULIN ASPART 15 UNITS: 100 INJECTION, SUSPENSION SUBCUTANEOUS at 17:00

## 2023-01-04 RX ADMIN — TRAZODONE HYDROCHLORIDE 50 MG: 50 TABLET ORAL at 21:41

## 2023-01-04 RX ADMIN — INSULIN ASPART 15 UNITS: 100 INJECTION, SUSPENSION SUBCUTANEOUS at 07:40

## 2023-01-04 RX ADMIN — ASPIRIN 81 MG: 81 TABLET, CHEWABLE ORAL at 07:44

## 2023-01-04 RX ADMIN — METOCLOPRAMIDE 5 MG: 5 TABLET ORAL at 17:00

## 2023-01-04 RX ADMIN — ATORVASTATIN CALCIUM 40 MG: 40 TABLET, FILM COATED ORAL at 07:43

## 2023-01-04 RX ADMIN — HEPARIN SODIUM 2000 UNITS: 1000 INJECTION INTRAVENOUS; SUBCUTANEOUS at 09:11

## 2023-01-04 RX ADMIN — Medication 6 MG: at 22:41

## 2023-01-04 RX ADMIN — Medication 10 ML: at 07:46

## 2023-01-04 NOTE — PLAN OF CARE
Goal Outcome Evaluation:         OT tx on this date.  Pt was CGA for bed mobility.  He needed CGA for 20' ambulation and toilet transfer. Pt performed B UE 2 x 10 reps overhead and bicep curls.

## 2023-01-04 NOTE — THERAPY TREATMENT NOTE
Acute Care - Physical Therapy Treatment Note  [facility]     Patient Name: Austin Egan  : 1955  MRN: [id number]  Today's Date: 2023      Visit Dx:     ICD-10-CM ICD-9-CM   1. Impaired mobility and ADLs  Z74.09 V49.89    Z78.9    2. Impaired functional mobility, balance, gait, and endurance  Z74.09 V49.89     Patient Active Problem List   Diagnosis   • Confusion and disorientation   • Acute metabolic encephalopathy     Past Medical History:   Diagnosis Date   • Atrial fibrillation (HCC)    • Blindness    • Diabetes mellitus (HCC)    • Elevated cholesterol    • Hypertension    • Kidney disease    • Renal failure      No past surgical history on file.  PT Assessment (last 12 hours)     PT Evaluation and Treatment     Row Name 23 1349          Physical Therapy Time and Intention    Subjective Information no complaints  -TA     Document Type therapy note (daily note)  -TA     Mode of Treatment physical therapy  -TA     Row Name 23 1343          General Information    Patient Profile Reviewed yes  -TA     Row Name 23 1349          Pain    Pretreatment Pain Rating 0/10 - no pain  -TA     Posttreatment Pain Rating 0/10 - no pain  -TA     Row Name 23 1349          Cognition    Orientation Status (Cognition) oriented x 3;oriented to;place;verbal cues/prompts needed for orientation  -TA     Personal Safety Interventions fall prevention program maintained;gait belt;muscle strengthening facilitated;nonskid shoes/slippers when out of bed;supervised activity  -TA     Row Name 23 1340          Range of Motion Comprehensive    General Range of Motion bilateral lower extremity ROM WFL  -TA     Row Name 23 1349          Bed Mobility    Bed Mobility supine-sit;sit-supine  -TA     Supine-Sit [place] (Bed Mobility) minimum assist (75% patient effort)  -TA     Sit-Supine [place] (Bed Mobility) minimum assist (75% patient effort)  -TA     Assistive Device (Bed Mobility)  head of bed elevated;bed rails  -TA     Row Name 01/04/23 1349          Transfers    Transfers sit-stand transfer;stand-sit transfer  -TA     Row Name 01/04/23 1349          Sit-Stand Transfer    Sit-Stand [place] (Transfers) contact guard  -TA     Assistive Device (Sit-Stand Transfers) walker, front-wheeled  -TA     Row Name 01/04/23 1349          Stand-Sit Transfer    Stand-Sit [place] (Transfers) contact guard  -TA     Assistive Device (Stand-Sit Transfers) walker, front-wheeled  -TA     Row Name 01/04/23 1349          Gait/Stairs (Locomotion)    [place] Level (Gait) contact guard  -TA     Assistive Device (Gait) walker, front-wheeled  -TA     Distance in Feet (Gait) 20` x 2  -TA     Row Name 01/04/23 1349          Hip (Therapeutic Exercise)    Hip (Therapeutic Exercise) AROM (active range of motion)  -TA     Hip AROM (Therapeutic Exercise) bilateral;flexion;aBduction;aDduction;sitting;15 repititions  -TA     Row Name 01/04/23 1349          Knee (Therapeutic Exercise)    Knee (Therapeutic Exercise) AROM (active range of motion)  -TA     Knee AROM (Therapeutic Exercise) bilateral;LAQ (long arc quad);sitting;15 repititions  -TA     Row Name 01/04/23 1349          Ankle (Therapeutic Exercise)    Ankle (Therapeutic Exercise) AROM (active range of motion)  -TA     Ankle AROM (Therapeutic Exercise) bilateral;dorsiflexion;plantarflexion;sitting;20 repititions  -TA     Row Name 01/04/23 1349          Plan of Care Review    Plan of Care Reviewed With patient  -TA     Outcome Evaluation pt sup<.sit with min assist, sit<>stand with CGA, pt ambulated 20` with RW & CGA. pt would benefit from assistance & continued PT services  -TA     Row Name 01/04/23 1349          Vital Signs    Pre Systolic BP Rehab 152  -TA     Pre Treatment Diastolic BP 67  -TA     Post Systolic BP Rehab 164  -TA     Post Treatment Diastolic BP 74  -TA     Pretreatment Heart Rate (beats/min) 67  -TA     Posttreatment Heart Rate (beats/min)  69  -TA     Pre SpO2 (%) 95  -TA     O2 Delivery Pre Treatment room air  -TA     Post SpO2 (%) 96  -TA     O2 Delivery Post Treatment room air  -TA     Pre Patient Position Supine  -TA     Post Patient Position Supine  -TA     Row Name 01/04/23 1344          Positioning and Restraints    Pre-Treatment Position in bed  -TA     Post Treatment Position bed  -TA     In Bed supine;call light within reach;exit alarm on  -TA     Row Name 01/04/23 1348          Therapy Assessment/Plan (PT)    Rehab Potential (PT) good, to achieve stated therapy goals  -TA     Criteria for Skilled Interventions Met (PT) yes;skilled treatment is necessary  -TA     Therapy Frequency (PT) 5 times/wk  -TA     Comment, Therapy Assessment/Plan (PT) continue  -TA     Row Name 01/04/23 1349          Bed Mobility Goal 1 (PT)    Activity/Assistive Device (Bed Mobility Goal 1, PT) sit to supine/supine to sit  -TA     [place] Level/Cues Needed (Bed Mobility Goal 1, PT) independent  -TA     Time Frame (Bed Mobility Goal 1, PT) by discharge  -TA     Strategies/Barriers (Bed Mobility Goal 1, PT) HOB flat, no bed rails.  -TA     Progress/Outcomes (Bed Mobility Goal 1, PT) goal not met  -TA     Row Name 01/04/23 1349          Transfer Goal 1 (PT)    Activity/Assistive Device (Transfer Goal 1, PT) sit-to-stand/stand-to-sit;bed-to-chair/chair-to-bed;walker, rolling  -TA     [place] Level/Cues Needed (Transfer Goal 1, PT) supervision required  -TA     Time Frame (Transfer Goal 1, PT) by discharge  -TA     Strategies/Barriers (Transfers Goal 1, PT) Facilitate upright posture.  -TA     Progress/Outcome (Transfer Goal 1, PT) goal not met  -TA     Row Name 01/04/23 1349          Gait Training Goal 1 (PT)    Activity/Assistive Device (Gait Training Goal 1, PT) walker, rolling  -TA     [place] Level (Gait Training Goal 1, PT) supervision required  -TA     Distance (Gait Training Goal 1, PT) 150'x1.  -TA     Time Frame (Gait Training Goal 1, PT) by  discharge  -TA     Strategies/Barriers (Gait Training Goal 1, PT) Some difficulty initiating movement.  -TA     Progress/Outcome (Gait Training Goal 1, PT) goal not met  -TA     Row Name 01/04/23 1349          Problem Specific Goal 1 (PT)    Problem Specific Goal 1 (PT) Score 24/28 on Tinetti fall risk assessment.  -TA     Time Frame (Problem Specific Goal 1, PT) by discharge  -TA     Strategies/Barriers (Problem Specific Goal 1, PT) Some difficulty initiating movement.  -TA     Progress/Outcome (Problem Specific Goal 1, PT) goal not met  -TA           User Key  (r) = Recorded By, (t) = Taken By, (c) = Cosigned By    Initials Name Provider Type    TA Rosalba Samano, PTA Physical Therapist Assistant                Physical Therapy Education     Title: PT OT SLP Therapies (In Progress)     Topic: Physical Therapy (In Progress)     Point: Mobility training (Done)     Learning Progress Summary           Patient Acceptance, E, VU,NR by  at 1/3/2023 1513    Comment: PT POC, hand placement with transfers, use of walker.                   Point: Home exercise program (Not Started)     Learner Progress:  Not documented in this visit.          Point: Body mechanics (Not Started)     Learner Progress:  Not documented in this visit.          Point: Precautions (Not Started)     Learner Progress:  Not documented in this visit.                      User Key     Initials Effective Dates Name Provider Type [facility] 09/18/22 -  John Paul Thakur, PT Physical Therapist PT              PT Recommendation and Plan  Anticipated Discharge Disposition (PT): home with assist, home with home health  Therapy Frequency (PT): 5 times/wk  Plan of Care Reviewed With: patient  Outcome Evaluation: pt sup<.sit with min assist, sit<>stand with CGA, pt ambulated 20` with RW & CGA. pt would benefit from assistance & continued PT services   Outcome Measures     Row Name 01/04/23 1600 01/04/23 1510          How much help from another person do  you currently need...    Turning from your back to your side while in flat bed without using bedrails? 3  -TA --     Moving from lying on back to sitting on the side of a flat bed without bedrails? 3  -TA --     Moving to and from a bed to a chair (including a wheelchair)? 3  -TA --     Standing up from a chair using your arms (e.g., wheelchair, bedside chair)? 3  -TA --     Climbing 3-5 steps with a railing? 2  -TA --     To walk in hospital room? 3  -TA --     AM-PAC 6 Clicks Score (PT) 17  -TA --        How much help from another is currently needed...    Putting on and taking off regular lower body clothing? -- 4  -RB     Bathing (including washing, rinsing, and drying) -- 3  -RB     Toileting (which includes using toilet bed pan or urinal) -- 4  -RB     Putting on and taking off regular upper body clothing -- 4  -RB     Taking care of personal grooming (such as brushing teeth) -- 4  -RB     Eating meals -- 4  -RB     AM-PAC 6 Clicks Score (OT) -- 23  -RB        Functional Assessment    Outcome Measure Options AM-PAC 6 Clicks Basic Mobility (PT)  -TA --           User Key  (r) = Recorded By, (t) = Taken By, (c) = Cosigned By    Initials Name Provider Type    Alexis Cordero, OT Occupational Therapist    Rosalba Jean PTA Physical Therapist Assistant                 Time Calculation:    PT Charges     Row Name 01/04/23 1606             Time Calculation    Start Time 1349  -TA      Stop Time 1415  -TA      Time Calculation (min) 26 min  -TA      PT Received On 01/04/23  -TA         Time Calculation- PT    Total Timed Code Minutes- PT 26 minute(s)  -TA         Timed Charges    63370 - PT Therapeutic Exercise Minutes 13  -TA      06580 - Gait Training Minutes  13  -TA         Total Minutes    Timed Charges Total Minutes 26  -TA       Total Minutes 26  -TA            User Key  (r) = Recorded By, (t) = Taken By, (c) = Cosigned By    Initials Name Provider Type    Rosalba Jean PTA Physical Therapist  Assistant              Therapy Charges for Today     Code Description Service Date Service Provider Modifiers Qty    35499376917 HC PT THER PROC EA 15 MIN 1/4/2023 Rosalba Samano, PTA GP 1    34344412739 HC GAIT TRAINING EA 15 MIN 1/4/2023 Rosalba Samano, RADHA GP 1          PT G-Codes  Outcome Measure Options: AM-PAC 6 Clicks Basic Mobility (PT)  AM-PAC 6 Clicks Score (PT): 17  AM-PAC 6 Clicks Score (OT): 23    Rosalba Samano PTA  1/4/2023

## 2023-01-04 NOTE — PLAN OF CARE
Goal Outcome Evaluation:  Plan of Care Reviewed With: patient           Outcome Evaluation: pt sup<.sit with min assist, sit<>stand with CGA, pt ambulated 20` with RW & CGA. pt would benefit from assistance & continued PT services

## 2023-01-04 NOTE — THERAPY TREATMENT NOTE
Acute Care - Occupational Therapy Treatment Note  [facility]     Patient Name: Austin Egan  : 1955  MRN: [id number]  Today's Date: 2023             Admit Date: 2023       ICD-10-CM ICD-9-CM   1. Impaired mobility and ADLs  Z74.09 V49.89    Z78.9    2. Impaired functional mobility, balance, gait, and endurance  Z74.09 V49.89     Patient Active Problem List   Diagnosis   • Confusion and disorientation   • Acute metabolic encephalopathy     Past Medical History:   Diagnosis Date   • Atrial fibrillation (HCC)    • Blindness    • Diabetes mellitus (HCC)    • Elevated cholesterol    • Hypertension    • Kidney disease    • Renal failure      No past surgical history on file.      OT ASSESSMENT FLOWSHEET (last 12 hours)     OT Evaluation and Treatment     Row Name 23 1510                   OT Time and Intention    Subjective Information no complaints  -RB        Document Type therapy note (daily note)  -RB        Mode of Treatment occupational therapy  -RB           General Information    Patient Profile Reviewed yes  -RB        Existing Precautions/Restrictions fall  -RB           Pain Assessment    Pretreatment Pain Rating 0/10 - no pain  -RB        Posttreatment Pain Rating 0/10 - no pain  -RB           Cognition    Orientation Status (Cognition) oriented x 3;oriented to;place;verbal cues/prompts needed for orientation  -RB           Range of Motion Comprehensive    General Range of Motion bilateral lower extremity ROM WFL  -RB           Activities of Daily Living    BADL Assessment/Intervention lower body dressing  -RB           Lower Body Dressing Assessment/Training    [place] Level (Lower Body Dressing) doff;don;socks;independent  -RB           Bed Mobility    Bed Mobility supine-sit;sit-supine  -RB        Supine-Sit [place] (Bed Mobility) contact guard  -RB        Sit-Supine [place] (Bed Mobility) supervision  -RB        Assistive Device (Bed Mobility) head of bed  elevated;bed rails  -RB           Functional Mobility    Functional Mobility- Ind. Level contact guard assist  -RB        Functional Mobility- Device walker, front-wheeled  -RB        Functional Mobility-Distance (Feet) 20  -RB        Functional Mobility- Safety Issues sequencing ability decreased;balance decreased during turns  -RB           Sit-Stand Transfer    Sit-Stand [place] (Transfers) contact guard  -RB        Assistive Device (Sit-Stand Transfers) walker, front-wheeled  -RB           Toilet Transfer    Type (Toilet Transfer) sit-stand;stand-sit  -RB        [place] Level (Toilet Transfer) contact guard  -RB        Assistive Device (Toilet Transfer) raised toilet seat;grab bars/safety frame;walker, front-wheeled  -RB           Motor Skills    Therapeutic Exercise shoulder;elbow/forearm  -RB           Shoulder (Therapeutic Exercise)    Shoulder (Therapeutic Exercise) strengthening exercise  -RB        Shoulder Strengthening (Therapeutic Exercise) bilateral;flexion;extension;sitting;2 lb free weight;2 sets;10 repetitions  -RB           Elbow/Forearm (Therapeutic Exercise)    Elbow/Forearm (Therapeutic Exercise) strengthening exercise  -RB        Elbow/Forearm Strengthening (Therapeutic Exercise) bilateral;flexion;extension;sitting;2 lb free weight;2 sets;10 repetitions  -RB           Vital Signs    Pre Systolic BP Rehab 142  -RB        Pre Treatment Diastolic BP 65  -RB        Post Systolic BP Rehab 153  -RB        Post Treatment Diastolic BP 70  -RB        Pretreatment Heart Rate (beats/min) 69  -RB        Posttreatment Heart Rate (beats/min) 70  -RB        Pre SpO2 (%) 90  -RB        O2 Delivery Pre Treatment room air  -RB        Intra SpO2 (%) 96  -RB        O2 Delivery Intra Treatment room air  -RB        Post SpO2 (%) 93  -RB        Pre Patient Position Supine  -RB           Positioning and Restraints    Pre-Treatment Position in bed  -RB        Post Treatment Position bed  -RB           Therapy  Plan Review/Discharge Plan (OT)    Anticipated Discharge Disposition (OT) home with assist  -RB              User Key  (r) = Recorded By, (t) = Taken By, (c) = Cosigned By    Initials Name Effective Dates    RB Alexis Larios OT 06/16/21 -                  Occupational Therapy Education     Title: PT OT SLP Therapies (In Progress)     Topic: Occupational Therapy (Done)     Point: ADL training (Done)     Description:   Instruct learner(s) on proper safety adaptation and remediation techniques during self care or transfers.   Instruct in proper use of assistive devices.              Learning Progress Summary           Patient Acceptance, E,TB, VU by  at 1/3/2023 1514    Comment: OT role, POC, t/f training                   Point: Home exercise program (Done)     Description:   Instruct learner(s) on appropriate technique for monitoring, assisting and/or progressing therapeutic exercises/activities.              Learning Progress Summary           Patient Acceptance, E, VU by  at 1/4/2023 1605    Comment: Initiated B UE HEP with 2# dumbell.  Two sets of 10 reps for bicep curls and overhead.                   Point: Precautions (Done)     Description:   Instruct learner(s) on prescribed precautions during self-care and functional transfers.              Learning Progress Summary           Patient Acceptance, E,TB, VU by  at 1/3/2023 1514    Comment: OT role, POC, t/f training                   Point: Body mechanics (Done)     Description:   Instruct learner(s) on proper positioning and spine alignment during self-care, functional mobility activities and/or exercises.              Learning Progress Summary           Patient Acceptance, E,TB, VU by  at 1/3/2023 1514    Comment: OT role, POC, t/f training                               User Key     Initials Effective Dates Name Provider Type Discipline     06/16/21 -  Alexis Larios OT Occupational Therapist OT     10/19/22 -  Michelle Padilla OT Occupational  Therapist OT                  OT Recommendation and Plan           Outcome Measures     Row Name 01/04/23 1600 01/04/23 1510          How much help from another person do you currently need...    Turning from your back to your side while in flat bed without using bedrails? 3  -TA --     Moving from lying on back to sitting on the side of a flat bed without bedrails? 3  -TA --     Moving to and from a bed to a chair (including a wheelchair)? 3  -TA --     Standing up from a chair using your arms (e.g., wheelchair, bedside chair)? 3  -TA --     Climbing 3-5 steps with a railing? 2  -TA --     To walk in hospital room? 3  -TA --     AM-PAC 6 Clicks Score (PT) 17  -TA --        How much help from another is currently needed...    Putting on and taking off regular lower body clothing? -- 4  -RB     Bathing (including washing, rinsing, and drying) -- 3  -RB     Toileting (which includes using toilet bed pan or urinal) -- 4  -RB     Putting on and taking off regular upper body clothing -- 4  -RB     Taking care of personal grooming (such as brushing teeth) -- 4  -RB     Eating meals -- 4  -RB     AM-PAC 6 Clicks Score (OT) -- 23  -RB        Functional Assessment    Outcome Measure Options AM-PAC 6 Clicks Basic Mobility (PT)  -TA --           User Key  (r) = Recorded By, (t) = Taken By, (c) = Cosigned By    Initials Name Provider Type    Alexis Cordero OT Occupational Therapist    Rosalba Jean, PTA Physical Therapist Assistant                Time Calculation:    Time Calculation- OT     Row Name 01/04/23 1610             Time Calculation- OT    OT Start Time 1510  -RB      OT Stop Time 1550  -RB      OT Time Calculation (min) 40 min  -RB      OT Received On 01/04/23  -RB            User Key  (r) = Recorded By, (t) = Taken By, (c) = Cosigned By    Initials Name Provider Type    Alexis Cordero OT Occupational Therapist              Therapy Charges for Today     Code Description Service Date Service Provider  Modifiers Qty    28508735785 HC OT SELF CARE/MGMT/TRAIN EA 15 MIN 1/4/2023 Alexis Larios OT GO 1    32302776640 HC OT THER PROC EA 15 MIN 1/4/2023 Alexis Larios OT GO 1    41810726998 HC OT THERAPEUTIC ACT EA 15 MIN 1/4/2023 Alexis Larios, OT GO 1               Alexis Larios OT  1/4/2023

## 2023-01-04 NOTE — DISCHARGE PLACEMENT REQUEST
Patient is admitted to [facility] under OBV status since 1/2/23. Anticipate d/c on 1/5/23. Please call Zurdo BRITT RN, CM with any questions at [phone].    Tanja Drew (67 y.o. Male)     Date of Birth   1955    Social Security Number       Address   [place] [address]    Home Phone   [phone]    MRN   [id number]       Mandaen   Hinduism    Marital Status   Single                            Admission Date   1/2/23    Admission Type   Urgent    Admitting Provider   Antwon Solis MD    Attending Provider   Antwon Solis MD    Department, Room/Bed   [address], 369/1       Discharge Date       Discharge Disposition       Discharge Destination                               Attending Provider: Antwon Solis MD    Allergies: No Known Allergies    Isolation: None   Infection: None   Code Status: CPR    Ht: 180.3 cm (70.98\")   Wt: 86.8 kg (191 lb 4.8 oz)    Admission Cmt: None   Principal Problem: Acute metabolic encephalopathy [G93.41]                 Active Insurance as of 1/2/2023     Primary Coverage     Payor Plan Insurance Group Employer/Plan Group    WELLCARE [facility] MEDICARE REPLACEMENT WELLCARE MEDICARE REPLACEMENT [id number]     Payor Plan Address Payor Plan Phone Number Payor Plan Fax Number Effective Dates    [address] [address] [phone]  12/1/2022 - None Entered    [facility] [address]       Subscriber Name Subscriber Birth Date Member ID       TANJA DREW 1955 [id number]           Secondary Coverage     Payor Plan Insurance Group Employer/Plan Group    [place]      Payor Plan Address Payor Plan Phone Number Payor Plan Fax Number Effective Dates    [address] [phone]  12/26/2022 - None Entered    [facility] [id number]       Subscriber Name Subscriber Birth Date Member ID       TANJA DREW BENEDICTO 1955 [id number]                 Emergency Contacts      (Rel.) Home  Phone Work Phone Mobile Phone    Mamie Kraft (Daughter) [phone] -- [phone]               History & Physical      Echendu, Antwon BUTCHER MD at 01/02/23 1504                [facility] Medicine Services  INPATIENT HISTORY AND PHYSICAL       Patient Care Team:  Provider, No Known as PCP - General    Date of Admission: 1/2/2023    Primary Care Physician: Provider, No Known    Chief complaint: Decreased level of consciousness.    Subjective     Patient is a 67 y.o. male with history of end-stage renal disease (on hemodialysis Mondays, Wednesdays and Fridays), diabetes mellitus, hypertension, dyslipidemia who was transferred as a direct admission from [facility] emergency department to [facility] secondary to mental status change arising from sepsis.   Patient was reportedly undergoing hemodialysis today when he developed mental status change with temperature of 101.   There was no reported chest pain, shortness of air, nausea, vomiting, headaches, blurry vision, dysuria or hematuria.    At [facility] emergency department, he had noncontrast CT scan of the head which did not show any acute changes.  His chest x-ray was clear, COVID-19 PCR was negative, lactic acid was 2.6, WBC 18,000 and urinalysis showed evidence of urinary tract infection.  Patient had reportedly had urine culture done a few days prior which showed gram-negative rods.    Patient was discharged from this hospital on 12/29/2022 following a 3-day admission for for mental status change/confusion.      Review of Systems   Constitutional: Positive for activity change, appetite change, fatigue and fever. Negative for chills and diaphoresis.   HENT: Negative for trouble swallowing and voice change.    Eyes: Negative for photophobia and visual disturbance.   Respiratory: Negative for cough, choking, chest tightness, shortness of breath, wheezing and stridor.    Cardiovascular: Negative for chest pain,  palpitations and leg swelling.   Gastrointestinal: Negative for abdominal distention, abdominal pain, blood in stool, constipation, diarrhea, nausea and vomiting.   Endocrine: Negative for cold intolerance, heat intolerance, polydipsia, polyphagia and polyuria.   Genitourinary: Positive for decreased urine volume. Negative for difficulty urinating, dysuria, enuresis, flank pain, frequency, hematuria and urgency.   Musculoskeletal: Negative for arthralgias, gait problem, myalgias, neck pain and neck stiffness.   Skin: Negative for pallor, rash and wound.   Neurological: Negative for dizziness, tremors, seizures, syncope, facial asymmetry, speech difficulty, weakness, light-headedness, numbness and headaches.   Hematological: Does not bruise/bleed easily.   Psychiatric/Behavioral: Positive for confusion. Negative for agitation and behavioral problems.         History  Past Medical History:   Diagnosis Date   • Atrial fibrillation (HCC)    • Blindness    • Diabetes mellitus (HCC)    • Elevated cholesterol    • Hypertension    • Kidney disease    • Renal failure      No past surgical history on file.  Family History   Problem Relation Age of Onset   • Cancer Mother    • Hyperlipidemia Other    • Kidney disease Other    • Hypertension Other    • Diabetes Other      Social History     Tobacco Use   • Smoking status: Never   • Smokeless tobacco: Never   Substance Use Topics   • Alcohol use: Never   • Drug use: Never     Medications Prior to Admission   Medication Sig Dispense Refill Last Dose   • acetaminophen (TYLENOL) 500 MG tablet Take 500 mg by mouth Every 6 (Six) Hours As Needed for Mild Pain (2 capsule daily).      • aspirin 81 MG chewable tablet Chew 81 mg Daily.      • atorvastatin (LIPITOR) 40 MG tablet Take 1 tablet by mouth Daily for 30 days. 30 tablet 0    • Ferric Citrate (Auryxia) 1  MG(Fe) tablet Take 210 mg by mouth 3 (Three) Times a Day. 2 tablets tid      • insulin aspart protamine-insulin aspart  (novoLOG 70/30) injection Inject 15 Units under the skin into the appropriate area as directed 2 (Two) Times a Day With Meals.      • Melatonin 10 MG capsule Take 30 mg by mouth Every Night.      • metoclopramide (REGLAN) 5 MG tablet Take 5 mg by mouth 4 (Four) Times a Day.      • metoprolol succinate XL (TOPROL-XL) 50 MG 24 hr tablet Take 50 mg by mouth Daily.      • midodrine (PROAMATINE) 10 MG tablet Take 10 mg by mouth 3 (Three) Times a Week. 30 minutes before dialysis      • Multiple Vitamins-Minerals (PRESERVISION AREDS 2 PO) Take 2 tablet/day by mouth Daily.      • multivitamin with minerals tablet tablet Take 1 tablet by mouth Daily.      • SEVELAMER HCL PO Take 800 mg by mouth Daily With Dinner.      • simvastatin (ZOCOR) 80 MG tablet Take 80 mg by mouth Every Night.      • traZODone (DESYREL) 50 MG tablet Take 50 mg by mouth Every Night.      • vitamin D (ERGOCALCIFEROL) 1.25 MG (80294 UT) capsule capsule Take 50,000 Units by mouth Every 7 (Seven) Days.        Allergies:  Patient has no known allergies.  Prior to Admission medications    Medication Sig Start Date End Date Taking? Authorizing Provider   acetaminophen (TYLENOL) 500 MG tablet Take 500 mg by mouth Every 6 (Six) Hours As Needed for Mild Pain (2 capsule daily).    Dwaine Valentin MD   aspirin 81 MG chewable tablet Chew 81 mg Daily.    Dwaine Valentin MD   atorvastatin (LIPITOR) 40 MG tablet Take 1 tablet by mouth Daily for 30 days. 12/29/22 1/28/23  Clifton Davis MD   Ferric Citrate (Auryxia) 1  MG(Fe) tablet Take 210 mg by mouth 3 (Three) Times a Day. 2 tablets tid    Dwaine Valentin MD   insulin aspart protamine-insulin aspart (novoLOG 70/30) injection Inject 15 Units under the skin into the appropriate area as directed 2 (Two) Times a Day With Meals.    Dwaine Valentin MD   Melatonin 10 MG capsule Take 30 mg by mouth Every Night.    Dwaine Valentin MD   metoclopramide (REGLAN) 5 MG tablet Take 5 mg  by mouth 4 (Four) Times a Day.    Dwaine Valentin MD   metoprolol succinate XL (TOPROL-XL) 50 MG 24 hr tablet Take 50 mg by mouth Daily.    Dwaine Valentin MD   midodrine (PROAMATINE) 10 MG tablet Take 10 mg by mouth 3 (Three) Times a Week. 30 minutes before dialysis    Dwaine Valentin MD   Multiple Vitamins-Minerals (PRESERVISION AREDS 2 PO) Take 2 tablet/day by mouth Daily.    Dwaine Valentin MD   multivitamin with minerals tablet tablet Take 1 tablet by mouth Daily.    Dwaine Valentin MD   SEVELAMER HCL PO Take 800 mg by mouth Daily With Dinner.    Dwaine Valentin MD   simvastatin (ZOCOR) 80 MG tablet Take 80 mg by mouth Every Night.    Dwaine Valentin MD   traZODone (DESYREL) 50 MG tablet Take 50 mg by mouth Every Night.    Dwaine Valentin MD   vitamin D (ERGOCALCIFEROL) 1.25 MG (10088 UT) capsule capsule Take 50,000 Units by mouth Every 7 (Seven) Days. 12/28/22   Dwaine Valentin MD       Objective        Vital Signs  Temp:  [100.4 °F (38 °C)] 100.4 °F (38 °C)  Heart Rate:  [87] 87  Resp:  [20] 20  BP: (138)/(63) 138/63      Physical Exam  Vitals and nursing note reviewed.   Constitutional:       General: He is not in acute distress.     Appearance: He is well-developed. He is ill-appearing. He is not diaphoretic.      Comments: Patient is chronically ill looking.   HENT:      Head: Normocephalic and atraumatic.   Eyes:      General: No scleral icterus.        Right eye: No discharge.         Left eye: No discharge.      Extraocular Movements: Extraocular movements intact.      Pupils: Pupils are equal, round, and reactive to light.   Neck:      Thyroid: No thyromegaly.      Vascular: No carotid bruit or JVD.   Cardiovascular:      Rate and Rhythm: Normal rate and regular rhythm.      Heart sounds: Normal heart sounds. No murmur heard.    No friction rub. No gallop.   Pulmonary:      Effort: Pulmonary effort is normal. No respiratory distress.      Breath  sounds: Normal breath sounds. No stridor. No wheezing or rales.   Chest:      Chest wall: No tenderness.   Abdominal:      General: Bowel sounds are normal. There is no distension.      Palpations: Abdomen is soft. There is no mass.      Tenderness: There is no abdominal tenderness. There is no right CVA tenderness, left CVA tenderness, guarding or rebound.      Hernia: No hernia is present.   Genitourinary:     Comments: Indwelling Suero catheter is in place.  Musculoskeletal:         General: No swelling, tenderness or deformity.      Cervical back: Normal range of motion and neck supple.      Right lower leg: No edema.      Left lower leg: No edema.   Skin:     General: Skin is warm and dry.      Coloration: Skin is not jaundiced or pale.      Findings: No bruising, erythema, lesion or rash.   Neurological:      General: No focal deficit present.      Mental Status: He is alert and oriented to person, place, and time.      Cranial Nerves: No cranial nerve deficit.      Sensory: No sensory deficit.      Motor: No weakness or abnormal muscle tone.      Coordination: Coordination normal.      Gait: Gait normal.      Deep Tendon Reflexes: Reflexes normal.   Psychiatric:         Mood and Affect: Mood normal.         Behavior: Behavior normal.         Thought Content: Thought content normal.         Judgment: Judgment normal.           Results Review:     Results from last 7 days   Lab Units 12/29/22  0611 12/28/22  0529 12/27/22  0607 12/26/22  1855   SODIUM mmol/L 135* 138 139 138   POTASSIUM mmol/L 4.0 4.5 4.7 4.6   CHLORIDE mmol/L 91* 96* 96* 94*   CO2 mmol/L 30.0* 28.0 30.0* 29.0   BUN mg/dL 39* 60* 49* 44*   CREATININE mg/dL 5.74* 7.71* 5.90* 5.72*   GLUCOSE mg/dL 260* 202* 198* 230*   CALCIUM mg/dL 9.8 9.8 9.5 9.5   BILIRUBIN mg/dL  --   --   --  0.4   ALK PHOS U/L  --   --   --  138*   ALT (SGPT) U/L  --   --   --  20   AST (SGOT) U/L  --   --   --  19       Results from last 7 days   Lab Units 12/29/22  0611  12/28/22  0529 12/27/22  0607   MAGNESIUM mg/dL 1.7 2.0 1.9       Results from last 7 days   Lab Units 12/29/22  0611 12/28/22  0529 12/27/22  0607 12/26/22  1855   WBC 10*3/mm3 15.13* 15.00* 8.41 11.69*   HEMOGLOBIN g/dL 10.7* 10.9* 10.5* 10.8*   HEMATOCRIT % 34.1* 34.3* 33.9* 34.2*   PLATELETS 10*3/mm3 187 171 152 143           Imaging Results (Last 7 Days)     ** No results found for the last 168 hours. **          Assessment / Plan       Hospital Problem List:    Acute metabolic encephalopathy    Acute metabolic encephalopathy/sepsis (likely secondary to acute cystitis): Patient's mental status was back to normal during my assessment.  Begin IV antibiotics, gentle IV hydration and check blood cultures.  Leukocytosis and lactic acidosis are reactive and will be monitored.    End-stage renal disease: Consult nephrologist for inpatient hemodialysis on his regularly scheduled days.    Diabetes mellitus: Continue basal insulin with Accu-Cheks and sliding scale insulin.    Begin GI and DVT prophylaxis.    Additional orders and treatment plan as hospital course dictates.    I confirmed that the patient's Advance Care Plan is present, code status is documented, or surrogate decision maker is listed in the patient's medical record.     I have utilized all available immediate resources to obtain, update, or review the patient's current medications    I discussed the patient's findings and my recommendations with patient.     Antwon Solis MD  01/02/23  15:04 CST      Dictated Utilizing Dragon Dictation         Electronically signed by Antwon Solis MD at 01/02/23 5292

## 2023-01-04 NOTE — PLAN OF CARE
Problem: Adult Inpatient Plan of Care  Goal: Plan of Care Review  Outcome: Ongoing, Progressing  Flowsheets  Taken 1/4/2023 1612 by Liya Bliss, RN  Outcome Evaluation: Dialysis this shift, 1.6L removed.  Abx infused as ordered.  Pt states he is \"ready to go home.\"  Blood sugar treated per order.  No new concerns.  Call light easily within reach.  Taken 1/4/2023 1349 by Rosalba Samano PTA  Plan of Care Reviewed With: patient  Taken 1/3/2023 1521 by Liya Bliss, RN  Progress: improving   Goal Outcome Evaluation:  Plan of Care Reviewed With: patient, daughter        Progress: improving  Outcome Evaluation: Dialysis this shift, 1.6L removed.  Abx infused as ordered.  Pt states he is \"ready to go home.\"  Blood sugar treated per order.  No new concerns.  Call light easily within reach.

## 2023-01-04 NOTE — PROGRESS NOTES
[facility]  INPATIENT PROGRESS NOTE    Length of Stay: 0  Date of Admission: 1/2/2023  Primary Care Physician: Ana De Leon MD    Subjective   Chief Complaint: No new complaints.    HPI: Patient is seen for follow-up today 1/4/2023 during hemodialysis.  He is a 67 y.o. male with history of end-stage renal disease (on hemodialysis Mondays, Wednesdays and Fridays), diabetes mellitus, hypertension, dyslipidemia who was admitted for acute metabolic encephalopathy/sepsis secondary to acute cystitis.     He is doing better, remains deconditioned, remains afebrile, tolerating his diet and voices no new complaints.  His mental status remains at his baseline.    Review of Systems   Constitutional: Positive for activity change and fatigue. Negative for appetite change, chills, diaphoresis and fever.   HENT: Negative for trouble swallowing and voice change.    Eyes: Negative for photophobia and visual disturbance.   Respiratory: Negative for cough, choking, chest tightness, shortness of breath, wheezing and stridor.    Cardiovascular: Negative for chest pain, palpitations and leg swelling.   Gastrointestinal: Negative for abdominal distention, abdominal pain, blood in stool, constipation, diarrhea, nausea and vomiting.   Endocrine: Negative for cold intolerance, heat intolerance, polydipsia, polyphagia and polyuria.   Genitourinary: Positive for decreased urine volume. Negative for difficulty urinating, dysuria, enuresis, flank pain, frequency, hematuria and urgency.   Musculoskeletal: Negative for arthralgias, gait problem, myalgias, neck pain and neck stiffness.   Skin: Negative for pallor, rash and wound.   Neurological: Negative for dizziness, tremors, seizures, syncope, facial asymmetry, speech difficulty, weakness, light-headedness, numbness and headaches.   Hematological: Does not bruise/bleed easily.   Psychiatric/Behavioral: Negative for agitation,  behavioral problems and confusion.       Objective    Temp:  [98 °F (36.7 °C)-99.1 °F (37.3 °C)] 98.2 °F (36.8 °C)  Heart Rate:  [57-81] 67  Resp:  [18-20] 18  BP: (112-162)/(56-68) 112/57    AM-PAC 6 Clicks Score (PT): 17 (01/04/23 0751)    Physical Exam  Vitals and nursing note reviewed.   Constitutional:       General: He is not in acute distress.     Appearance: He is well-developed. He is ill-appearing. He is not diaphoretic.      Comments: He is chronically ill looking.   HENT:      Head: Normocephalic and atraumatic.   Eyes:      General: No scleral icterus.        Right eye: No discharge.         Left eye: No discharge.      Extraocular Movements: Extraocular movements intact.      Pupils: Pupils are equal, round, and reactive to light.   Neck:      Thyroid: No thyromegaly.      Vascular: No carotid bruit or JVD.   Cardiovascular:      Rate and Rhythm: Normal rate and regular rhythm.      Heart sounds: Normal heart sounds. No murmur heard.    No friction rub. No gallop.   Pulmonary:      Effort: Pulmonary effort is normal. No respiratory distress.      Breath sounds: Normal breath sounds. No stridor. No wheezing or rales.   Chest:      Chest wall: No tenderness.   Abdominal:      General: Bowel sounds are normal. There is no distension.      Palpations: Abdomen is soft. There is no mass.      Tenderness: There is no abdominal tenderness. There is no right CVA tenderness, left CVA tenderness, guarding or rebound.      Hernia: No hernia is present.   Musculoskeletal:         General: No swelling, tenderness or deformity.      Cervical back: Normal range of motion and neck supple.      Right lower leg: No edema.      Left lower leg: No edema.   Skin:     General: Skin is warm and dry.      Coloration: Skin is not jaundiced or pale.      Findings: No bruising, erythema, lesion or rash.   Neurological:      General: No focal deficit present.      Mental Status: He is alert and oriented to person, place, and time.       Cranial Nerves: No cranial nerve deficit.      Sensory: No sensory deficit.      Motor: No weakness or abnormal muscle tone.      Coordination: Coordination normal.      Gait: Gait normal.      Deep Tendon Reflexes: Reflexes normal.   Psychiatric:         Mood and Affect: Mood normal.         Behavior: Behavior normal.         Thought Content: Thought content normal.         Judgment: Judgment normal.           Medication Review:    Current Facility-Administered Medications:   •  acetaminophen (TYLENOL) tablet 650 mg, 650 mg, Oral, Q4H PRN **OR** acetaminophen (TYLENOL) 160 MG/5ML solution 650 mg, 650 mg, Oral, Q4H PRN **OR** acetaminophen (TYLENOL) suppository 650 mg, 650 mg, Rectal, Q4H PRN, Antwon Solis MD  •  albumin human 25 % IV SOLN 12.5 g, 12.5 g, Intravenous, PRN, William Fowler MD  •  aspirin chewable tablet 81 mg, 81 mg, Oral, Daily, Antwon Solis MD, 81 mg at 01/04/23 0744  •  atorvastatin (LIPITOR) tablet 40 mg, 40 mg, Oral, Daily, Antwon Solis MD, 40 mg at 01/04/23 0743  •  calcium carbonate (TUMS) chewable tablet 500 mg (200 mg elemental), 1 tablet, Oral, TID PRN, Antwon Solis MD  •  cefTRIAXone (ROCEPHIN) 1 g/100 mL 0.9% NS (MBP), 1 g, Intravenous, Q24H, Antwon Solis MD, Stopped at 01/03/23 1546  •  dextrose (D50W) (25 g/50 mL) IV injection 25 g, 25 g, Intravenous, Q15 Min PRN, Antwon Solis MD  •  dextrose (GLUTOSE) oral gel 15 g, 15 g, Oral, Q15 Min PRN, Antwon Solis MD  •  epoetin casey (EPOGEN,PROCRIT) injection 6,000 Units, 6,000 Units, Intravenous, Once per day on Mon Wed Fri, William Fowler MD, 6,000 Units at 01/04/23 0945  •  glucagon (human recombinant) (GLUCAGEN DIAGNOSTIC) injection 1 mg, 1 mg, Intramuscular, Q15 Min PRN, Antwon Solis MD  •  heparin (porcine) 5000 UNIT/ML injection 5,000 Units, 5,000 Units, Subcutaneous, Q12H, Antwon Solis MD, 5,000 Units at 01/04/23 0745  •  heparin (porcine) injection 2,000 Units, 2,000  Units, Intravenous, PRN, William Fowler MD, 2,000 Units at 01/04/23 0911  •  Insulin Aspart (novoLOG) injection 0-7 Units, 0-7 Units, Subcutaneous, TID AC, Antwon Solis MD, 2 Units at 01/04/23 0738  •  insulin aspart prot-insulin aspart (novoLOG 70/30) injection 15 Units, 15 Units, Subcutaneous, BID With Meals, Antwon Solis MD, 15 Units at 01/04/23 0740  •  metoclopramide (REGLAN) tablet 5 mg, 5 mg, Oral, 4x Daily, Antwon Solis MD, 5 mg at 01/04/23 0743  •  metoprolol succinate XL (TOPROL-XL) 24 hr tablet 50 mg, 50 mg, Oral, Daily, Antwon Solis MD, 50 mg at 01/04/23 0744  •  midodrine (PROAMATINE) tablet 10 mg, 10 mg, Oral, Once per day on Mon Wed Fri, Antwon Solis MD, 10 mg at 01/04/23 0744  •  ondansetron (ZOFRAN) tablet 4 mg, 4 mg, Oral, Q6H PRN **OR** ondansetron (ZOFRAN) injection 4 mg, 4 mg, Intravenous, Q6H PRN, Antwon Solis MD  •  sodium chloride 0.9 % flush 10 mL, 10 mL, Intravenous, Q12H, Antwon Solis MD, 10 mL at 01/04/23 0746  •  sodium chloride 0.9 % flush 10 mL, 10 mL, Intravenous, PRN, Antwon Solis MD  •  sodium chloride 0.9 % infusion 40 mL, 40 mL, Intravenous, PRN, Antwon Solis MD  •  sodium chloride 0.9 % infusion, 50 mL/hr, Intravenous, Continuous, Antwon Solis MD, Last Rate: 50 mL/hr at 01/04/23 0605, 50 mL/hr at 01/04/23 0605  •  traZODone (DESYREL) tablet 50 mg, 50 mg, Oral, Nightly, Antwon Solis MD, 50 mg at 01/03/23 2009    Results Review:  I have reviewed the labs, radiology results, and diagnostic studies.    Laboratory Data:   Results from last 7 days   Lab Units 01/04/23  0636 01/03/23  0818 01/02/23  1538   SODIUM mmol/L 134* 136 138   POTASSIUM mmol/L 4.1 4.0 4.1   CHLORIDE mmol/L 97* 95* 95*   CO2 mmol/L 23.0 27.0 27.0   BUN mg/dL 62* 52* 40*   CREATININE mg/dL 8.17* 7.11* 5.90*   GLUCOSE mg/dL 162* 207* 144*   CALCIUM mg/dL 9.1 9.6 9.5   BILIRUBIN mg/dL  --   --  0.3   ALK PHOS U/L  --   --  104   ALT (SGPT)  U/L  --   --  16   AST (SGOT) U/L  --   --  20   ANION GAP mmol/L 14.0 14.0 16.0*     Estimated Creatinine Clearance: 10.8 mL/min (A) (by C-G formula based on SCr of 8.17 mg/dL (H)).  Results from last 7 days   Lab Units 12/29/22  0611   MAGNESIUM mg/dL 1.7         Results from last 7 days   Lab Units 01/04/23  0636 01/03/23  0818 01/02/23  1538 12/29/22  0611   WBC 10*3/mm3 8.27 10.32 12.86* 15.13*   HEMOGLOBIN g/dL 8.8* 9.1* 9.6* 10.7*   HEMATOCRIT % 26.9* 28.9* 30.1* 34.1*   PLATELETS 10*3/mm3 194 170 195 187           Culture Data:   Blood Culture   Date Value Ref Range Status   01/02/2023 No growth at 24 hours  Preliminary   01/02/2023 No growth at 24 hours  Preliminary     No results found for: URINECX  No results found for: RESPCX  No results found for: WOUNDCX  No results found for: STOOLCX  No components found for: BODYFLD    Radiology Data:   Imaging Results (Last 24 Hours)     ** No results found for the last 24 hours. **          I have reviewed the patient's current medications.     Assessment/Plan     Hospital Problem List:  Principal Problem:    Acute metabolic encephalopathy    Acute metabolic encephalopathy/sepsis (likely secondary to acute cystitis): Patient's mental status remains at baseline.  Continue IV antibiotics.  Leukocytosis and lactic acidosis have resolved and blood culture showed no growth.         End-stage renal disease: Patient is to continue hemodialysis on his regularly scheduled days.  Nephrology is following.  Mild hyponatremia is clinically insignificant.     Diabetes mellitus: Stable.  Continue basal insulin with Accu-Cheks and sliding scale insulin.    Deconditioning: Continue PT and OT.     Continue GI and DVT prophylaxis    Discharge Planning: Likely discharge in a.m.      I confirmed that the patient's Advance Care Plan is present, code status is documented, or surrogate decision maker is listed in the patient's medical record.     I have utilized all available immediate  resources to obtain, update, or review the patient's current medications      Antwon Solis MD   01/04/23   10:11 CST

## 2023-01-04 NOTE — PROGRESS NOTES
[facility]  [address] [place] [address]. [id number]  T - [phone]  F - [phone]     Progress Note          PATIENT  DEMOGRAPHICS   PATIENT NAME: Austin DIANE Jignesh                      PHYSICIAN: William Fowler MD  : 1955  MRN: [id number]   LOS: 0 days    Patient Care Team:  Ana De Leon MD as PCP - General (Family Medicine)  Subjective   SUBJECTIVE   Seen during HD - bp is stabl         Objective   OBJECTIVE   Vital Signs  Temp:  [98 °F (36.7 °C)-99.1 °F (37.3 °C)] 98.2 °F (36.8 °C)  Heart Rate:  [57-81] 63  Resp:  [18-20] 18  BP: (112-162)/(56-68) 125/58    Flowsheet Rows    Flowsheet Row First Filed Value   Admission Height 180.3 cm (70.98\") Documented at 2023 1410   Admission Weight 85 kg (187 lb 6.4 oz) Documented at 2023 1504           I/O last 3 completed shifts:  In: 240 [P.O.:240]  Out: 1100 [Urine:1100]    PHYSICAL EXAM    Physical Exam  Constitutional:       Appearance: He is well-developed.   HENT:      Head: Normocephalic.   Eyes:      Pupils: Pupils are equal, round, and reactive to light.   Cardiovascular:      Rate and Rhythm: Normal rate and regular rhythm.      Heart sounds: Normal heart sounds.   Pulmonary:      Effort: Pulmonary effort is normal.      Breath sounds: Normal breath sounds.   Abdominal:      General: Bowel sounds are normal.      Palpations: Abdomen is soft.   Musculoskeletal:         General: No swelling.   Skin:     Coloration: Skin is not jaundiced.   Neurological:      General: No focal deficit present.      Mental Status: He is alert and oriented to person, place, and time.         RESULTS   Results Review:    Results from last 7 days   Lab Units 23  0636 23  0818 23  1538   SODIUM mmol/L 134* 136 138   POTASSIUM mmol/L 4.1 4.0 4.1   CHLORIDE mmol/L 97* 95* 95*   CO2 mmol/L 23.0 27.0 27.0   BUN mg/dL 62* 52* 40*   CREATININE mg/dL 8.17* 7.11* 5.90*   CALCIUM mg/dL 9.1 9.6 9.5   BILIRUBIN mg/dL  --   --  0.3    ALK PHOS U/L  --   --  104   ALT (SGPT) U/L  --   --  16   AST (SGOT) U/L  --   --  20   GLUCOSE mg/dL 162* 207* 144*       Estimated Creatinine Clearance: 10.8 mL/min (A) (by C-G formula based on SCr of 8.17 mg/dL (H)).    Results from last 7 days   Lab Units 12/29/22  0611   MAGNESIUM mg/dL 1.7             Results from last 7 days   Lab Units 01/04/23  0636 01/03/23  0818 01/02/23  1538 12/29/22  0611   WBC 10*3/mm3 8.27 10.32 12.86* 15.13*   HEMOGLOBIN g/dL 8.8* 9.1* 9.6* 10.7*   PLATELETS 10*3/mm3 194 170 195 187               Imaging Results (Last 24 Hours)     ** No results found for the last 24 hours. **           MEDICATIONS    aspirin, 81 mg, Oral, Daily  atorvastatin, 40 mg, Oral, Daily  cefTRIAXone, 1 g, Intravenous, Q24H  epoetin casey/casey-epbx, 6,000 Units, Intravenous, Once per day on Mon Wed Fri  heparin (porcine), 5,000 Units, Subcutaneous, Q12H  Insulin Aspart, 0-7 Units, Subcutaneous, TID AC  insulin aspart prot-insulin aspart, 15 Units, Subcutaneous, BID With Meals  metoclopramide, 5 mg, Oral, 4x Daily  metoprolol succinate XL, 50 mg, Oral, Daily  midodrine, 10 mg, Oral, Once per day on Mon Wed Fri  sodium chloride, 10 mL, Intravenous, Q12H  traZODone, 50 mg, Oral, Nightly      sodium chloride, 50 mL/hr, Last Rate: 50 mL/hr (01/04/23 0605)        Assessment & Plan   ASSESSMENT / PLAN      Acute metabolic encephalopathy    1.  ESRD on HD- normally dialyzes MWF at [facility] under Dr. Gan.  Last dialysis was Monday.  We will plan to continue dialysis MWF while here. Discharged on 12/29/22. Was using urinal in last admission.     He does have left upper extremity graft which he says we are using as well as a tunneled dialysis catheter.     2.  Altered mental status- secondary to UTI. Has chronic freeman     3.  UTI / GNR (from outside hospital) - manage per primary team. No UA or culture available at [facility]     4.  Sepsis- blood culture results pending, managed per primary team     5.  DM2                    This document has been electronically signed by William Fowler MD on January 4, 2023 11:05 CST

## 2023-01-04 NOTE — THERAPY TREATMENT NOTE
Acute Care - Physical Therapy Treatment Note  [facility]     Patient Name: Austin Egan  : 1955  MRN: [id number]  Today's Date: 2023      Visit Dx:     ICD-10-CM ICD-9-CM   1. Impaired mobility and ADLs  Z74.09 V49.89    Z78.9    2. Impaired functional mobility, balance, gait, and endurance  Z74.09 V49.89     Patient Active Problem List   Diagnosis   • Confusion and disorientation   • Acute metabolic encephalopathy     Past Medical History:   Diagnosis Date   • Atrial fibrillation (HCC)    • Blindness    • Diabetes mellitus (HCC)    • Elevated cholesterol    • Hypertension    • Kidney disease    • Renal failure      No past surgical history on file.  PT Assessment (last 12 hours)     PT Evaluation and Treatment     Row Name 23 1349          Physical Therapy Time and Intention    Subjective Information no complaints  -TA     Document Type therapy note (daily note)  -TA     Mode of Treatment physical therapy  -TA     Row Name 23 1341          General Information    Patient Profile Reviewed yes  -TA     Row Name 23 1349          Pain    Pretreatment Pain Rating 0/10 - no pain  -TA     Posttreatment Pain Rating 0/10 - no pain  -TA     Row Name 23 1349          Cognition    Orientation Status (Cognition) oriented x 3;oriented to;place;verbal cues/prompts needed for orientation  -TA     Personal Safety Interventions fall prevention program maintained;gait belt;muscle strengthening facilitated;nonskid shoes/slippers when out of bed;supervised activity  -TA     Row Name 23 1346          Range of Motion Comprehensive    General Range of Motion bilateral lower extremity ROM WFL  -TA     Row Name 23 1349          Bed Mobility    Bed Mobility supine-sit;sit-supine  -TA     Supine-Sit [place] (Bed Mobility) minimum assist (75% patient effort)  -TA     Sit-Supine [place] (Bed Mobility) minimum assist (75% patient effort)  -TA     Assistive Device (Bed Mobility)  head of bed elevated;bed rails  -TA     Row Name 01/04/23 1349          Transfers    Transfers sit-stand transfer;stand-sit transfer  -TA     Row Name 01/04/23 1349          Sit-Stand Transfer    Sit-Stand [place] (Transfers) contact guard  -TA     Assistive Device (Sit-Stand Transfers) walker, front-wheeled  -TA     Row Name 01/04/23 1349          Stand-Sit Transfer    Stand-Sit [place] (Transfers) contact guard  -TA     Assistive Device (Stand-Sit Transfers) walker, front-wheeled  -TA     Row Name 01/04/23 1349          Gait/Stairs (Locomotion)    [place] Level (Gait) contact guard  -TA     Assistive Device (Gait) walker, front-wheeled  -TA     Distance in Feet (Gait) 20` x 2  -TA     Row Name 01/04/23 1349          Hip (Therapeutic Exercise)    Hip (Therapeutic Exercise) AROM (active range of motion)  -TA     Hip AROM (Therapeutic Exercise) bilateral;flexion;aBduction;aDduction;sitting;15 repititions  -TA     Row Name 01/04/23 1349          Knee (Therapeutic Exercise)    Knee (Therapeutic Exercise) AROM (active range of motion)  -TA     Knee AROM (Therapeutic Exercise) bilateral;LAQ (long arc quad);sitting;15 repititions  -TA     Row Name 01/04/23 1349          Ankle (Therapeutic Exercise)    Ankle (Therapeutic Exercise) AROM (active range of motion)  -TA     Ankle AROM (Therapeutic Exercise) bilateral;dorsiflexion;plantarflexion;sitting;20 repititions  -TA     Row Name 01/04/23 1349          Plan of Care Review    Plan of Care Reviewed With patient  -TA     Outcome Evaluation pt sup<.sit with min assist, sit<>stand with CGA, pt ambulated 20` with RW & CGA. pt would benefit from assistance & continued PT services  -TA     Row Name 01/04/23 1349          Vital Signs    Pre Systolic BP Rehab 152  -TA     Pre Treatment Diastolic BP 67  -TA     Post Systolic BP Rehab 164  -TA     Post Treatment Diastolic BP 74  -TA     Pretreatment Heart Rate (beats/min) 67  -TA     Posttreatment Heart Rate (beats/min)  69  -TA     Pre SpO2 (%) 95  -TA     O2 Delivery Pre Treatment room air  -TA     Post SpO2 (%) 96  -TA     O2 Delivery Post Treatment room air  -TA     Pre Patient Position Supine  -TA     Post Patient Position Supine  -TA     Row Name 01/04/23 1344          Positioning and Restraints    Pre-Treatment Position in bed  -TA     Post Treatment Position bed  -TA     In Bed supine;call light within reach;exit alarm on  -TA     Row Name 01/04/23 1341          Therapy Assessment/Plan (PT)    Rehab Potential (PT) good, to achieve stated therapy goals  -TA     Criteria for Skilled Interventions Met (PT) yes;skilled treatment is necessary  -TA     Therapy Frequency (PT) 5 times/wk  -TA     Comment, Therapy Assessment/Plan (PT) continue  -TA     Row Name 01/04/23 1349          Bed Mobility Goal 1 (PT)    Activity/Assistive Device (Bed Mobility Goal 1, PT) sit to supine/supine to sit  -TA     [place] Level/Cues Needed (Bed Mobility Goal 1, PT) independent  -TA     Time Frame (Bed Mobility Goal 1, PT) by discharge  -TA     Strategies/Barriers (Bed Mobility Goal 1, PT) HOB flat, no bed rails.  -TA     Progress/Outcomes (Bed Mobility Goal 1, PT) goal not met  -TA     Row Name 01/04/23 1349          Transfer Goal 1 (PT)    Activity/Assistive Device (Transfer Goal 1, PT) sit-to-stand/stand-to-sit;bed-to-chair/chair-to-bed;walker, rolling  -TA     [place] Level/Cues Needed (Transfer Goal 1, PT) supervision required  -TA     Time Frame (Transfer Goal 1, PT) by discharge  -TA     Strategies/Barriers (Transfers Goal 1, PT) Facilitate upright posture.  -TA     Progress/Outcome (Transfer Goal 1, PT) goal not met  -TA     Row Name 01/04/23 1349          Gait Training Goal 1 (PT)    Activity/Assistive Device (Gait Training Goal 1, PT) walker, rolling  -TA     [place] Level (Gait Training Goal 1, PT) supervision required  -TA     Distance (Gait Training Goal 1, PT) 150'x1.  -TA     Time Frame (Gait Training Goal 1, PT) by  discharge  -TA     Strategies/Barriers (Gait Training Goal 1, PT) Some difficulty initiating movement.  -TA     Progress/Outcome (Gait Training Goal 1, PT) goal not met  -TA     Row Name 01/04/23 1349          Problem Specific Goal 1 (PT)    Problem Specific Goal 1 (PT) Score 24/28 on Tinetti fall risk assessment.  -TA     Time Frame (Problem Specific Goal 1, PT) by discharge  -TA     Strategies/Barriers (Problem Specific Goal 1, PT) Some difficulty initiating movement.  -TA     Progress/Outcome (Problem Specific Goal 1, PT) goal not met  -TA           User Key  (r) = Recorded By, (t) = Taken By, (c) = Cosigned By    Initials Name Provider Type    TA Rosalba Samano, PTA Physical Therapist Assistant                Physical Therapy Education     Title: PT OT SLP Therapies (In Progress)     Topic: Physical Therapy (In Progress)     Point: Mobility training (Done)     Learning Progress Summary           Patient Acceptance, E, VU,NR by  at 1/3/2023 1513    Comment: PT POC, hand placement with transfers, use of walker.                   Point: Home exercise program (Not Started)     Learner Progress:  Not documented in this visit.          Point: Body mechanics (Not Started)     Learner Progress:  Not documented in this visit.          Point: Precautions (Not Started)     Learner Progress:  Not documented in this visit.                      User Key     Initials Effective Dates Name Provider Type [facility] 09/18/22 -  John Paul Thakur, PT Physical Therapist PT              PT Recommendation and Plan  Anticipated Discharge Disposition (PT): home with assist, home with home health  Therapy Frequency (PT): 5 times/wk  Plan of Care Reviewed With: patient  Outcome Evaluation: pt sup<.sit with min assist, sit<>stand with CGA, pt ambulated 20` with RW & CGA. pt would benefit from assistance & continued PT services   Outcome Measures     Row Name 01/04/23 1600 01/04/23 1510          How much help from another person do  you currently need...    Turning from your back to your side while in flat bed without using bedrails? 3  -TA --     Moving from lying on back to sitting on the side of a flat bed without bedrails? 3  -TA --     Moving to and from a bed to a chair (including a wheelchair)? 3  -TA --     Standing up from a chair using your arms (e.g., wheelchair, bedside chair)? 3  -TA --     Climbing 3-5 steps with a railing? 2  -TA --     To walk in hospital room? 3  -TA --     AM-PAC 6 Clicks Score (PT) 17  -TA --        How much help from another is currently needed...    Putting on and taking off regular lower body clothing? -- 4  -RB     Bathing (including washing, rinsing, and drying) -- 3  -RB     Toileting (which includes using toilet bed pan or urinal) -- 4  -RB     Putting on and taking off regular upper body clothing -- 4  -RB     Taking care of personal grooming (such as brushing teeth) -- 4  -RB     Eating meals -- 4  -RB     AM-PAC 6 Clicks Score (OT) -- 23  -RB        Functional Assessment    Outcome Measure Options AM-PAC 6 Clicks Basic Mobility (PT)  -TA --           User Key  (r) = Recorded By, (t) = Taken By, (c) = Cosigned By    Initials Name Provider Type    Alexis Cordero, OT Occupational Therapist    Rosalba Jean PTA Physical Therapist Assistant                 Time Calculation:    PT Charges     Row Name 01/04/23 1606             Time Calculation    Start Time 1349  -TA      Stop Time 1415  -TA      Time Calculation (min) 26 min  -TA      PT Received On 01/04/23  -TA         Time Calculation- PT    Total Timed Code Minutes- PT 26 minute(s)  -TA         Timed Charges    58376 - PT Therapeutic Exercise Minutes 13  -TA      73299 - Gait Training Minutes  13  -TA         Total Minutes    Timed Charges Total Minutes 26  -TA       Total Minutes 26  -TA            User Key  (r) = Recorded By, (t) = Taken By, (c) = Cosigned By    Initials Name Provider Type    Rosalba Jean PTA Physical Therapist  Assistant              Therapy Charges for Today     Code Description Service Date Service Provider Modifiers Qty    21906185228 HC PT THER PROC EA 15 MIN 1/4/2023 Rosalba Samano, PTA GP 1    79128668774 HC GAIT TRAINING EA 15 MIN 1/4/2023 Rosalba Samano, RADHA GP 1          PT G-Codes  Outcome Measure Options: AM-PAC 6 Clicks Basic Mobility (PT)  AM-PAC 6 Clicks Score (PT): 17  AM-PAC 6 Clicks Score (OT): 23    Rosalba Samano PTA  1/4/2023

## 2023-01-04 NOTE — PLAN OF CARE
Goal Outcome Evaluation: Pt in bed resting without difficulty. No c/o pain or discomfort voiced. No acute changes noted. VSS. No distress

## 2023-01-05 VITALS
SYSTOLIC BLOOD PRESSURE: 153 MMHG | RESPIRATION RATE: 16 BRPM | OXYGEN SATURATION: 94 % | HEART RATE: 78 BPM | BODY MASS INDEX: 26.54 KG/M2 | HEIGHT: 71 IN | TEMPERATURE: 98.2 F | DIASTOLIC BLOOD PRESSURE: 70 MMHG | WEIGHT: 189.6 LBS

## 2023-01-05 LAB
ANION GAP SERPL CALCULATED.3IONS-SCNC: 15 MMOL/L (ref 5–15)
BUN SERPL-MCNC: 41 MG/DL (ref 8–23)
BUN/CREAT SERPL: 7.5 (ref 7–25)
CALCIUM SPEC-SCNC: 9.5 MG/DL (ref 8.6–10.5)
CHLORIDE SERPL-SCNC: 96 MMOL/L (ref 98–107)
CO2 SERPL-SCNC: 25 MMOL/L (ref 22–29)
CREAT SERPL-MCNC: 5.48 MG/DL (ref 0.76–1.27)
EGFRCR SERPLBLD CKD-EPI 2021: 10.7 ML/MIN/1.73
GLUCOSE BLDC GLUCOMTR-MCNC: 194 MG/DL (ref 70–130)
GLUCOSE SERPL-MCNC: 195 MG/DL (ref 65–99)
POTASSIUM SERPL-SCNC: 4.3 MMOL/L (ref 3.5–5.2)
SODIUM SERPL-SCNC: 136 MMOL/L (ref 136–145)
WHOLE BLOOD HOLD SPECIMEN: NORMAL

## 2023-01-05 PROCEDURE — 80048 BASIC METABOLIC PNL TOTAL CA: CPT | Performed by: INTERNAL MEDICINE

## 2023-01-05 PROCEDURE — 97535 SELF CARE MNGMENT TRAINING: CPT

## 2023-01-05 PROCEDURE — 97116 GAIT TRAINING THERAPY: CPT

## 2023-01-05 PROCEDURE — 63710000001 INSULIN ASPART PER 5 UNITS: Performed by: INTERNAL MEDICINE

## 2023-01-05 PROCEDURE — 97110 THERAPEUTIC EXERCISES: CPT

## 2023-01-05 PROCEDURE — G0378 HOSPITAL OBSERVATION PER HR: HCPCS

## 2023-01-05 PROCEDURE — 82962 GLUCOSE BLOOD TEST: CPT

## 2023-01-05 RX ORDER — CEFDINIR 300 MG/1
300 CAPSULE ORAL
Status: DISCONTINUED | OUTPATIENT
Start: 2023-01-05 | End: 2023-01-05 | Stop reason: HOSPADM

## 2023-01-05 RX ORDER — CEFDINIR 300 MG/1
300 CAPSULE ORAL
Qty: 5 CAPSULE | Refills: 0 | Status: SHIPPED | OUTPATIENT
Start: 2023-01-05 | End: 2023-01-10

## 2023-01-05 RX ADMIN — CEFDINIR 300 MG: 300 CAPSULE ORAL at 10:30

## 2023-01-05 RX ADMIN — INSULIN ASPART 2 UNITS: 100 INJECTION, SOLUTION INTRAVENOUS; SUBCUTANEOUS at 08:34

## 2023-01-05 RX ADMIN — Medication 10 ML: at 08:35

## 2023-01-05 RX ADMIN — METOPROLOL SUCCINATE 50 MG: 50 TABLET, EXTENDED RELEASE ORAL at 08:30

## 2023-01-05 RX ADMIN — INSULIN ASPART 15 UNITS: 100 INJECTION, SUSPENSION SUBCUTANEOUS at 08:35

## 2023-01-05 RX ADMIN — METOCLOPRAMIDE 5 MG: 5 TABLET ORAL at 08:30

## 2023-01-05 RX ADMIN — ASPIRIN 81 MG: 81 TABLET, CHEWABLE ORAL at 08:30

## 2023-01-05 RX ADMIN — ATORVASTATIN CALCIUM 40 MG: 40 TABLET, FILM COATED ORAL at 08:30

## 2023-01-05 NOTE — THERAPY TREATMENT NOTE
Acute Care - Physical Therapy Treatment Note  [facility]     Patient Name: Austin Egan  : 1955  MRN: [id number]  Today's Date: 2023      Visit Dx:     ICD-10-CM ICD-9-CM   1. Impaired mobility and ADLs  Z74.09 V49.89    Z78.9    2. Impaired functional mobility, balance, gait, and endurance  Z74.09 V49.89     Patient Active Problem List   Diagnosis   • Confusion and disorientation   • Acute metabolic encephalopathy     Past Medical History:   Diagnosis Date   • Atrial fibrillation (HCC)    • Blindness    • Diabetes mellitus (HCC)    • Elevated cholesterol    • Hypertension    • Kidney disease    • Renal failure      No past surgical history on file.  PT Assessment (last 12 hours)     PT Evaluation and Treatment     Row Name 23 1009          Physical Therapy Time and Intention    Subjective Information no complaints  -TA     Document Type therapy note (daily note)  -TA     Mode of Treatment physical therapy  -TA     Row Name 23 1009          General Information    Patient Profile Reviewed yes  -TA     Row Name 23 1009          Pain    Pretreatment Pain Rating 0/10 - no pain  -TA     Posttreatment Pain Rating 0/10 - no pain  -TA     Row Name 23 1009          Cognition    Orientation Status (Cognition) oriented x 3;oriented to;place;verbal cues/prompts needed for orientation  -TA     Personal Safety Interventions fall prevention program maintained;gait belt;muscle strengthening facilitated;nonskid shoes/slippers when out of bed;supervised activity  -TA     Row Name 23 1009          Range of Motion Comprehensive    General Range of Motion bilateral lower extremity ROM WFL  -TA     Row Name 23 1009          Bed Mobility    Bed Mobility supine-sit;sit-supine  -TA     Supine-Sit [place] (Bed Mobility) not tested  -TA     Sit-Supine [place] (Bed Mobility) standby assist  -TA     Assistive Device (Bed Mobility) head of bed elevated;bed rails  -TA     Row  Name 01/05/23 1009          Transfers    Transfers sit-stand transfer;stand-sit transfer  -TA     Row Name 01/05/23 1009          Sit-Stand Transfer    Sit-Stand [place] (Transfers) standby assist  -TA     Assistive Device (Sit-Stand Transfers) walker, front-wheeled  -TA     Row Name 01/05/23 1009          Stand-Sit Transfer    Stand-Sit [place] (Transfers) standby assist  -TA     Assistive Device (Stand-Sit Transfers) walker, front-wheeled  -TA     Row Name 01/05/23 1009          Gait/Stairs (Locomotion)    [place] Level (Gait) contact guard  -TA     Assistive Device (Gait) walker, front-wheeled  -TA     Distance in Feet (Gait) 40`  -TA     Row Name 01/05/23 1009          Hip (Therapeutic Exercise)    Hip (Therapeutic Exercise) AROM (active range of motion)  -TA     Hip AROM (Therapeutic Exercise) bilateral;flexion;sitting;20 repititions  -TA     Row Name 01/05/23 1009          Knee (Therapeutic Exercise)    Knee (Therapeutic Exercise) AROM (active range of motion)  -TA     Knee AROM (Therapeutic Exercise) bilateral;LAQ (long arc quad);20 repititions  -TA     Row Name 01/05/23 1009          Ankle (Therapeutic Exercise)    Ankle (Therapeutic Exercise) AROM (active range of motion)  -TA     Ankle AROM (Therapeutic Exercise) bilateral;dorsiflexion;plantarflexion;sitting;20 repititions  -TA     Row Name 01/05/23 1009          Plan of Care Review    Plan of Care Reviewed With patient  -TA     Progress improving  -TA     Outcome Evaluation pt sitting on EOB upon entry. pt sit>sup with SBA, pt sit<>stand with SBA, pt ambulated 40` with RW & CGA. pt would benefit from home with assistance & HHPT  -TA     Row Name 01/05/23 1009          Vital Signs    Pre Systolic BP Rehab 139  -TA     Pre Treatment Diastolic BP 71  -TA     Post Systolic BP Rehab 156  -TA     Post Treatment Diastolic BP 71  -TA     Pretreatment Heart Rate (beats/min) 79  -TA     Intratreatment Heart Rate (beats/min) 83  -TA     Posttreatment  Heart Rate (beats/min) 74  -TA     Pre SpO2 (%) 96  -TA     O2 Delivery Pre Treatment room air  -TA     Intra SpO2 (%) 94  -TA     O2 Delivery Intra Treatment room air  -TA     Post SpO2 (%) 96  -TA     O2 Delivery Post Treatment room air  -TA     Pre Patient Position Sitting  -TA     Post Patient Position Supine  -TA     Row Name 01/05/23 1009          Therapy Assessment/Plan (PT)    Rehab Potential (PT) good, to achieve stated therapy goals  -TA     Criteria for Skilled Interventions Met (PT) yes;skilled treatment is necessary  -TA     Therapy Frequency (PT) 5 times/wk  -TA     Comment, Therapy Assessment/Plan (PT) continue  -TA     Row Name 01/05/23 1009          Bed Mobility Goal 1 (PT)    Activity/Assistive Device (Bed Mobility Goal 1, PT) sit to supine/supine to sit  -TA     [place] Level/Cues Needed (Bed Mobility Goal 1, PT) independent  -TA     Time Frame (Bed Mobility Goal 1, PT) by discharge  -TA     Strategies/Barriers (Bed Mobility Goal 1, PT) HOB flat, no bed rails.  -TA     Progress/Outcomes (Bed Mobility Goal 1, PT) goal not met  -TA     Row Name 01/05/23 1009          Transfer Goal 1 (PT)    Activity/Assistive Device (Transfer Goal 1, PT) sit-to-stand/stand-to-sit;bed-to-chair/chair-to-bed;walker, rolling  -TA     [place] Level/Cues Needed (Transfer Goal 1, PT) supervision required  -TA     Time Frame (Transfer Goal 1, PT) by discharge  -TA     Strategies/Barriers (Transfers Goal 1, PT) Facilitate upright posture.  -TA     Progress/Outcome (Transfer Goal 1, PT) goal not met  -TA     Row Name 01/05/23 1009          Gait Training Goal 1 (PT)    Activity/Assistive Device (Gait Training Goal 1, PT) walker, rolling  -TA     [place] Level (Gait Training Goal 1, PT) supervision required  -TA     Distance (Gait Training Goal 1, PT) 150'x1.  -TA     Time Frame (Gait Training Goal 1, PT) by discharge  -TA     Strategies/Barriers (Gait Training Goal 1, PT) Some difficulty initiating movement.   -TA     Progress/Outcome (Gait Training Goal 1, PT) goal not met  -TA     Row Name 01/05/23 1009          Problem Specific Goal 1 (PT)    Problem Specific Goal 1 (PT) Score 24/28 on Tinetti fall risk assessment.  -TA     Time Frame (Problem Specific Goal 1, PT) by discharge  -TA     Strategies/Barriers (Problem Specific Goal 1, PT) Some difficulty initiating movement.  -TA     Progress/Outcome (Problem Specific Goal 1, PT) goal not met  -TA           User Key  (r) = Recorded By, (t) = Taken By, (c) = Cosigned By    Initials Name Provider Type    TA Rosalba Samano, PTA Physical Therapist Assistant                Physical Therapy Education     Title: PT OT SLP Therapies (Resolved)     Topic: Physical Therapy (Resolved)     Point: Mobility training (Resolved)     Learning Progress Summary           Patient Acceptance, E, VU,NR by  at 1/3/2023 1513    Comment: PT POC, hand placement with transfers, use of walker.                   Point: Home exercise program (Resolved)     Learner Progress:  Not documented in this visit.          Point: Body mechanics (Resolved)     Learner Progress:  Not documented in this visit.          Point: Precautions (Resolved)     Learner Progress:  Not documented in this visit.                      User Key     Initials Effective Dates Name Provider Type Discipline     09/18/22 -  John Paul Thakur, PT Physical Therapist PT              PT Recommendation and Plan  Anticipated Discharge Disposition (PT): home with assist, home with home health  Therapy Frequency (PT): 5 times/wk  Plan of Care Reviewed With: patient  Progress: improving  Outcome Evaluation: pt sitting on EOB upon entry. pt sit>sup with SBA, pt sit<>stand with SBA, pt ambulated 40` with RW & CGA. pt would benefit from home with assistance & HHPT   Outcome Measures     Row Name 01/05/23 1200 01/04/23 1600 01/04/23 1510       How much help from another person do you currently need...    Turning from your back to your side  while in flat bed without using bedrails? 4  -TA 3  -TA --    Moving from lying on back to sitting on the side of a flat bed without bedrails? 3  -TA 3  -TA --    Moving to and from a bed to a chair (including a wheelchair)? 3  -TA 3  -TA --    Standing up from a chair using your arms (e.g., wheelchair, bedside chair)? 3  -TA 3  -TA --    Climbing 3-5 steps with a railing? 2  -TA 2  -TA --    To walk in hospital room? 3  -TA 3  -TA --    AM-PAC 6 Clicks Score (PT) 18  -TA 17  -TA --       How much help from another is currently needed...    Putting on and taking off regular lower body clothing? -- -- 4  -RB    Bathing (including washing, rinsing, and drying) -- -- 3  -RB    Toileting (which includes using toilet bed pan or urinal) -- -- 4  -RB    Putting on and taking off regular upper body clothing -- -- 4  -RB    Taking care of personal grooming (such as brushing teeth) -- -- 4  -RB    Eating meals -- -- 4  -RB    AM-PAC 6 Clicks Score (OT) -- -- 23  -RB       Functional Assessment    Outcome Measure Options AM-PAC 6 Clicks Basic Mobility (PT)  -TA AM-PAC 6 Clicks Basic Mobility (PT)  -TA --          User Key  (r) = Recorded By, (t) = Taken By, (c) = Cosigned By    Initials Name Provider Type    Alexis Cordero OT Occupational Therapist    Rosalba Jean PTA Physical Therapist Assistant                 Time Calculation:    PT Charges     Row Name 01/05/23 1256             Time Calculation    Start Time 1009  -TA      Stop Time 1035  -TA      Time Calculation (min) 26 min  -TA      PT Received On 01/05/23  -TA         Time Calculation- PT    Total Timed Code Minutes- PT 26 minute(s)  -TA         Timed Charges    83760 - PT Therapeutic Exercise Minutes 13  -TA      98564 - Gait Training Minutes  13  -TA         Total Minutes    Timed Charges Total Minutes 26  -TA       Total Minutes 26  -TA            User Key  (r) = Recorded By, (t) = Taken By, (c) = Cosigned By    Initials Name Provider Type    NADIR Samano  Rosalba WEST PTA Physical Therapist Assistant              Therapy Charges for Today     Code Description Service Date Service Provider Modifiers Qty    50476291696 HC PT THER PROC EA 15 MIN 1/4/2023 Rosalba Samano, PTA GP 1    37966695566 HC GAIT TRAINING EA 15 MIN 1/4/2023 Rosalba Samano, PTA GP 1    99004923830 HC PT THER PROC EA 15 MIN 1/5/2023 Rosalba Samano, RADHA GP 1    03409108010 HC GAIT TRAINING EA 15 MIN 1/5/2023 Rosalba Samano, PTA GP 1          PT G-Codes  Outcome Measure Options: AM-PAC 6 Clicks Basic Mobility (PT)  AM-PAC 6 Clicks Score (PT): 18  AM-PAC 6 Clicks Score (OT): 23    Rosalba Samano PTA  1/5/2023

## 2023-01-05 NOTE — PLAN OF CARE
Goal Outcome Evaluation: pt in bed resting without difficulty at this time. No c/o pain or discomfort voiced this shift. No acute changes. VSS. No distress.

## 2023-01-05 NOTE — NURSING NOTE
Pt c/o not being able to sleep and is requesting melatonin. MINERVA Vanegas notified with new orders placed and carried out as directed.

## 2023-01-05 NOTE — PLAN OF CARE
Goal Outcome Evaluation:  Discharging home with family.  AVS sent to home health, report called.  [place] removed.  No other concerns.

## 2023-01-05 NOTE — PLAN OF CARE
Goal Outcome Evaluation:  Plan of Care Reviewed With: patient        Progress: improving  Outcome Evaluation: pt sitting on EOB upon entry. pt sit>sup with SBA, pt sit<>stand with SBA, pt ambulated 40` with RW & CGA. pt would benefit from home with assistance & HHPT

## 2023-01-05 NOTE — DISCHARGE PLACEMENT REQUEST
Patient is being discharged home today. Remained in OBV status here at [facility]. I have attached the HH order that the hospitalist placed this AM.   Thanks,  Zurdo BRITT RN,  [phone]      Jignesh Tanja BENEDICTO (67 y.o. Male)         Date of Birth   1955    Social Security Number       Address   [place] [address]    Home Phone   [phone]    MRN   [id number]           Mandaen   Rastafari    Marital Status   Single                            Admission Date   1/2/23    Admission Type   Urgent    Admitting Provider   Antwon Solis MD    Attending Provider   Antwon Solis MD    Department, Room/Bed   [address], 369/1       Discharge Date       Discharge Disposition   Home-Health Care [facility]    Discharge Destination                               Attending Provider: Antwon Solis MD    Allergies: No Known Allergies    Isolation: None   Infection: None   Code Status: CPR    Ht: 180.3 cm (70.98\")   Wt: 86 kg (189 lb 9.6 oz)    Admission Cmt: None   Principal Problem: Acute metabolic encephalopathy [G93.41]                 Active Insurance as of 1/2/2023     Primary Coverage     Payor Plan Insurance Group Employer/Plan Group    WELLCARE [facility] MEDICARE REPLACEMENT WELLCARE MEDICARE REPLACEMENT [id number]     Payor Plan Address Payor Plan Phone Number Payor Plan Fax Number Effective Dates    [address] [address] [phone]  12/1/2022 - None Entered    [facility] [address]       Subscriber Name Subscriber Birth Date Member ID       TANJA DREW BENEDICTO 1955 [id number]           Secondary Coverage     Payor Plan Insurance Group Employer/Plan Group    [place]      Payor Plan Address Payor Plan Phone Number Payor Plan Fax Number Effective Dates    [address] [phone]  12/26/2022 - None Entered    [place] [address]       Subscriber Name Subscriber Birth Date Member ID       TANJA DREW BENEDICTO 1955 [id number]                  Emergency Contacts      (Rel.) Home Phone Work Phone Mobile Phone    Mamie Kraft (Daughter) [phone] -- [phone]             [address] [address]  Phone:  [phone]  Fax:  [phone] Date: 2023      Ambulatory Referral to Home Health ([facility])     Patient:  Austin Egan MRN:  [id number]   [address] [place] [address] :  1955  SSN:    Phone: [phone] Sex:  M      INSURANCE PAYOR PLAN GROUP # SUBSCRIBER ID   Primary:  Secondary:    [employer] [place] MEDICARE REPLACEMENT  [place] [id number]  [id number]    [id number]  [id number]      Referring Provider Information:  SUNDAR MONIQUE Phone: [phone] Fax: [phone]       Referral Information:   # Visits:  999 Referral Type: Home Health [42]   Urgency:  Routine Referral Reason: Specialty Services Required   Start Date: 2023 End Date:  To be determined by Insurer   Diagnosis: Impaired functional mobility, balance, gait, and endurance (Z74.09 [ICD-10-CM] V49.89 [ICD-9-CM])      Refer to Dept:   Refer to Provider:   Refer to Provider Phone:   Refer to Facility:       Face to Face Visit Date: 2023  Follow-up provider for Plan of Care? I treated the patient in an acute care facility and will not continue treatment after discharge.  Follow-up provider: MARY HALL [[id number]]  Reason/Clinical Findings: Deconditioning  Describe mobility limitations that make leaving home difficult: Deconditioning  Nursing/Therapeutic Services Requested: Skilled Nursing  Nursing/Therapeutic Services Requested: Physical Therapy  Nursing/Therapeutic Services Requested: Occupational Therapy  Skilled nursing orders: Medication education  Skilled nursing orders: Cardiopulmonary assessments  Skilled nursing orders: Neurovascular assessments  PT orders: Home safety assessment  PT orders: Therapeutic  exercise  Occupational orders: Activities of daily living  Occupational orders: Home safety assessment  Frequency: 1 Week 1     This document serves as a request of services and does not constitute Insurance authorization or approval of services.  To determine eligibility, please contact the members Insurance carrier to verify and review coverage.     If you have medical questions regarding this request for services. Please contact [address] at [phone] during normal business hours.        Authorizing Provider:Antwon Solis MD  Authorizing Provider's NPI: [id number]  Order Entered By: Antwon Solis MD 1/5/2023  8:21 AM     Electronically signed by: Antwon Solis MD 1/5/2023  8:21 AM

## 2023-01-05 NOTE — THERAPY TREATMENT NOTE
Patient Name: Austin Egan  : 1955    MRN: [id number]                              Today's Date: 2023       Admit Date: 2023    Visit Dx:     ICD-10-CM ICD-9-CM   1. Impaired mobility and ADLs  Z74.09 V49.89    Z78.9    2. Impaired functional mobility, balance, gait, and endurance  Z74.09 V49.89     Patient Active Problem List   Diagnosis   • Confusion and disorientation   • Acute metabolic encephalopathy     Past Medical History:   Diagnosis Date   • Atrial fibrillation (HCC)    • Blindness    • Diabetes mellitus (HCC)    • Elevated cholesterol    • Hypertension    • Kidney disease    • Renal failure      No past surgical history on file.   General Information     Row Name 23          OT Time and Intention    Document Type therapy note (daily note)  -BB     Mode of Treatment individual therapy;occupational therapy  -BB     Row Name 23          General Information    Patient Profile Reviewed yes  -BB     Existing Precautions/Restrictions fall  -BB     Row Name 23          Cognition    Orientation Status (Cognition) oriented x 3;oriented to;place;verbal cues/prompts needed for orientation  -BB     Row Name 23          Safety Issues, Functional Mobility    Impairments Affecting Function (Mobility) endurance/activity tolerance;strength;balance  -BB           User Key  (r) = Recorded By, (t) = Taken By, (c) = Cosigned By    Initials Name Provider Type    BB Yamilex Dahl COTA Occupational Therapist Assistant                 Mobility/ADL's     Row Name 23          Bed Mobility    Bed Mobility supine-sit;sit-supine  -BB     Supine-Sit [place] (Bed Mobility) standby assist  -BB     Sit-Supine [place] (Bed Mobility) standby assist  -BB     Assistive Device (Bed Mobility) head of bed elevated;bed rails  -BB     Row Name 23          Transfers    Transfers sit-stand transfer;stand-sit transfer  -BB     Row Name 23           Sit-Stand Transfer    Sit-Stand [place] (Transfers) standby assist  -BB     Assistive Device (Sit-Stand Transfers) walker, front-wheeled  -BB     Row Name 01/05/23 0826          Stand-Sit Transfer    Stand-Sit [place] (Transfers) standby assist  -BB     Assistive Device (Stand-Sit Transfers) walker, front-wheeled  -BB     Row Name 01/05/23 0826          Toilet Transfer    Type (Toilet Transfer) sit-stand;stand-sit  -BB     [place] Level (Toilet Transfer) contact guard  -BB     Assistive Device (Toilet Transfer) raised toilet seat;grab bars/safety frame;walker, front-wheeled  -BB     Row Name 01/05/23 0826          Functional Mobility    Functional Mobility- Ind. Level contact guard assist  -BB     Functional Mobility- Device walker, front-wheeled  -BB     Row Name 01/05/23 0826          Activities of Daily Living    BADL Assessment/Intervention grooming  -BB     Row Name 01/05/23 0826          Lower Body Dressing Assessment/Training    [place] Level (Lower Body Dressing) doff;don;socks;independent  -BB     Position (Lower Body Dressing) edge of bed sitting  -BB     Row Name 01/05/23 0826          Grooming Assessment/Training    [place] Level (Grooming) oral care regimen;wash face, hands;modified independence  -BB     Position (Grooming) edge of bed sitting  -BB           User Key  (r) = Recorded By, (t) = Taken By, (c) = Cosigned By    Initials Name Provider Type    Yamilex Frazier COTA Occupational Therapist Assistant               Obj/Interventions     Row Name 01/05/23 0826          Range of Motion Comprehensive    General Range of Motion bilateral lower extremity ROM WFL  -BB     Row Name 01/05/23 0826          Shoulder (Therapeutic Exercise)    Shoulder Strengthening (Therapeutic Exercise) resistance band;blue;10 repetitions;other (see comments)  over head, across chest, pull ups, punch outs  -BB           User Key  (r) = Recorded By, (t) = Taken By, (c) = Cosigned By     Initials Name Provider Type    Yamilex Frazier COTA Occupational Therapist Assistant               Goals/Plan     Row Name 01/05/23 0826          Transfer Goal 1 (OT)    Activity/Assistive Device (Transfer Goal 1, OT) transfers, all  -BB     [place] Level/Cues Needed (Transfer Goal 1, OT) independent  -BB     Time Frame (Transfer Goal 1, OT) long term goal (LTG);by discharge  -BB     Progress/Outcome (Transfer Goal 1, OT) goal not met  -BB     Row Name 01/05/23 0826          Dressing Goal 1 (OT)    Activity/Device (Dressing Goal 1, OT) dressing skills, all  -BB     [place]/Cues Needed (Dressing Goal 1, OT) independent  -BB     Time Frame (Dressing Goal 1, OT) long term goal (LTG);by discharge  -BB     Progress/Outcome (Dressing Goal 1, OT) goal not met  -BB     Row Name 01/05/23 0826          Problem Specific Goal 1 (OT)    Problem Specific Goal 1 (OT) Pt will participate in OOB fxnl activities for ~20 minutes to improve fxnl endurance.  -BB     Time Frame (Problem Specific Goal 1, OT) long term goal (LTG);by discharge  -BB     Progress/Outcome (Problem Specific Goal 1, OT) goal not met  -BB           User Key  (r) = Recorded By, (t) = Taken By, (c) = Cosigned By    Initials Name Provider Type    Yamilex Frazier COTA Occupational Therapist Assistant               Clinical Impression     Row Name 01/05/23 0826          Pain Assessment    Pretreatment Pain Rating 0/10 - no pain  -BB     Posttreatment Pain Rating 0/10 - no pain  -BB     Row Name 01/05/23 0826          Plan of Care Review    Plan of Care Reviewed With patient  -BB     Progress improving  -BB     Outcome Evaluation Pt is CGA for bed<>toilet t/f using RW. Pt educated on home safety/fall prevention. Pt issued blue t-band for home exercises at pt request. Continue OT POC  -BB     Row Name 01/05/23 0826          Therapy Assessment/Plan (OT)    Rehab Potential (OT) good, to achieve stated therapy goals  -BB     Criteria for  Skilled Therapeutic Interventions Met (OT) yes;skilled treatment is necessary  -BB     Therapy Frequency (OT) other (see comments)  5-7d/wk  -BB     Row Name 01/05/23 0826          Therapy Plan Review/Discharge Plan (OT)    Anticipated Discharge Disposition (OT) home with assist  -BB     Row Name 01/05/23 0826          Vital Signs    Pretreatment Heart Rate (beats/min) 73  -BB     Pre SpO2 (%) 97  -BB     O2 Delivery Pre Treatment room air  -BB     Pre Patient Position Supine  -BB     Row Name 01/05/23 0826          Positioning and Restraints    Pre-Treatment Position in bed  -BB     Post Treatment Position bed  -BB     In Bed fowlers;call light within reach;encouraged to call for assist;exit alarm on  -BB           User Key  (r) = Recorded By, (t) = Taken By, (c) = Cosigned By    Initials Name Provider Type    Yamilex Frazier COTA Occupational Therapist Assistant               Outcome Measures     Row Name 01/05/23 0826          How much help from another is currently needed...    Putting on and taking off regular lower body clothing? 4  -BB     Bathing (including washing, rinsing, and drying) 3  -BB     Toileting (which includes using toilet bed pan or urinal) 4  -BB     Putting on and taking off regular upper body clothing 4  -BB     Taking care of personal grooming (such as brushing teeth) 4  -BB     Eating meals 4  -BB     AM-PAC 6 Clicks Score (OT) 23  -BB     Row Name 01/05/23 1200 01/05/23 0736       How much help from another person do you currently need...    Turning from your back to your side while in flat bed without using bedrails? 4  -TA 3  -MS    Moving from lying on back to sitting on the side of a flat bed without bedrails? 3  -TA 3  -MS    Moving to and from a bed to a chair (including a wheelchair)? 3  -TA 3  -MS    Standing up from a chair using your arms (e.g., wheelchair, bedside chair)? 3  -TA 3  -MS    Climbing 3-5 steps with a railing? 2  -TA 2  -MS    To walk in hospital room? 3   -TA 3  -MS    AM-PAC 6 Clicks Score (PT) 18  -TA 17  -MS    Highest level of mobility 6 --> Walked 10 steps or more  -TA 5 --> Static standing  -MS    Row Name 01/05/23 1200          Functional Assessment    Outcome Measure Options AM-PAC 6 Clicks Basic Mobility (PT)  -TA           User Key  (r) = Recorded By, (t) = Taken By, (c) = Cosigned By    Initials Name Provider Type    TA Rosalba Samano, PTA Physical Therapist Assistant    BB Yamilex Dahl COTA Occupational Therapist Assistant    Liya Rosales, RN Registered Nurse                Occupational Therapy Education     Title: PT OT SLP Therapies (Resolved)     Topic: Occupational Therapy (Resolved)     Point: ADL training (Resolved)     Description:   Instruct learner(s) on proper safety adaptation and remediation techniques during self care or transfers.   Instruct in proper use of assistive devices.              Learning Progress Summary           Patient Acceptance, E,TB, VU by  at 1/3/2023 1514    Comment: OT role, POC, t/f training                   Point: Home exercise program (Resolved)     Description:   Instruct learner(s) on appropriate technique for monitoring, assisting and/or progressing therapeutic exercises/activities.              Learning Progress Summary           Patient Acceptance, E, VU by  at 1/4/2023 1605    Comment: Initiated B UE HEP with 2# dumbell.  Two sets of 10 reps for bicep curls and overhead.                   Point: Precautions (Resolved)     Description:   Instruct learner(s) on prescribed precautions during self-care and functional transfers.              Learning Progress Summary           Patient Acceptance, E,TB, VU by  at 1/3/2023 1514    Comment: OT role, POC, t/f training                   Point: Body mechanics (Resolved)     Description:   Instruct learner(s) on proper positioning and spine alignment during self-care, functional mobility activities and/or exercises.              Learning Progress  Summary           Patient Acceptance, E,TB, VU by  at 1/3/2023 1514    Comment: OT role, POC, t/f training                               User Key     Initials Effective Dates Name Provider Type Discipline    RB 06/16/21 -  Alexis Larios, OT Occupational Therapist OT     10/19/22 -  Michelle Padilla OT Occupational Therapist OT              OT Recommendation and Plan  Therapy Frequency (OT): other (see comments) (5-7d/wk)  Plan of Care Review  Plan of Care Reviewed With: patient  Progress: improving  Outcome Evaluation: Pt is CGA for bed<>toilet t/f using RW. Pt educated on home safety/fall prevention. Pt issued blue t-band for home exercises at pt request. Continue OT POC     Time Calculation:    Time Calculation- OT     Row Name 01/05/23 1404 01/05/23 1256          Time Calculation- OT    OT Start Time 0826  -BB --     OT Stop Time 0906  -BB --     OT Time Calculation (min) 40 min  -BB --     Total Timed Code Minutes- OT 40 minute(s)  -BB --     OT Received On 01/05/23  -BB --        Timed Charges    81665 - OT Therapeutic Exercise Minutes 25  -BB --     48699 - Gait Training Minutes  -- 13  -TA     52194 - OT Self Care/Mgmt Minutes 15  -BB --        Total Minutes    Timed Charges Total Minutes 40  -BB 13  -TA      Total Minutes 40  -BB 13  -TA           User Key  (r) = Recorded By, (t) = Taken By, (c) = Cosigned By    Initials Name Provider Type    TA Rosalba Samano, PTA Physical Therapist Assistant    BB Yamilex Dahl COTA Occupational Therapist Assistant              Therapy Charges for Today     Code Description Service Date Service Provider Modifiers Qty    72686886107 HC OT THER PROC EA 15 MIN 1/5/2023 Yamilex Dahl COTA GO 2    [id number] HC OT SELF CARE/MGMT/TRAIN EA 15 MIN 1/5/2023 Yamilex Dahl COTA GO 1               ETTA Madden  1/5/2023

## 2023-01-05 NOTE — DISCHARGE SUMMARY
[facility] Medicine Services  DISCHARGE SUMMARY       Date of Admission: 1/2/2023  Date of Discharge:  1/5/2023  Primary Care Physician: Ana De Leon MD    Presenting Problem/History of Present Illness:  Acute metabolic encephalopathy [G93.41]       Final Discharge Diagnoses:  Active Hospital Problems    Diagnosis    • **Acute metabolic encephalopathy        Consults:   Consults     Date and Time Order Name Status Description    1/2/2023  3:03 PM Inpatient Nephrology Consult Completed     12/26/2022  6:32 PM Inpatient Nephrology Consult Completed           Procedures Performed: None.                Pertinent Test Results:   Lab Results (last 7 days)     Procedure Component Value Units Date/Time    Basic Metabolic Panel [[id number]]  (Abnormal) Collected: 01/05/23 0524    Specimen: Blood Updated: 01/05/23 0658     Glucose 195 mg/dL      BUN 41 mg/dL      Creatinine 5.48 mg/dL      Sodium 136 mmol/L      Potassium 4.3 mmol/L      Comment: Slight hemolysis detected by analyzer. Results may be affected.        Chloride 96 mmol/L      CO2 25.0 mmol/L      Calcium 9.5 mg/dL      BUN/Creatinine Ratio 7.5     Anion Gap 15.0 mmol/L      eGFR 10.7 mL/min/1.73      Comment: <15 Indicative of kidney failure       Narrative:      GFR Normal >60  Chronic Kidney Disease <60  Kidney Failure <15      POC Glucose Once [[id number]]  (Abnormal) Collected: 01/05/23 0616    Specimen: Blood Updated: 01/05/23 0641     Glucose 194 mg/dL      Comment: RN NotifiedOperator: [id number] NICHOLAS FRANKMeter ID: [id number]       Extra Tubes [[id number]] Collected: 01/05/23 0524    Specimen: Blood, Venous Line Updated: 01/05/23 0630    Narrative:      The following orders were created for panel order Extra Tubes.  Procedure                               Abnormality         Status                     ---------                               -----------         ------                     Pat  Top[[id number]]                                     Final result                 Please view results for these tests on the individual orders.    Lavender Top [[id number]] Collected: 01/05/23 0524    Specimen: Blood Updated: 01/05/23 0630     Extra Tube hold for add-on     Comment: Auto resulted       POC Glucose Once [[id number]]  (Abnormal) Collected: 01/04/23 1938    Specimen: Blood Updated: 01/04/23 1959     Glucose 222 mg/dL      Comment: RN NotifiedOperator: [id number] NICHOLAS MARIEMeter ID: [id number]       Blood Culture - Blood, Hand, Left [[id number]]  (Normal) Collected: 01/02/23 1539    Specimen: Blood from Hand, Left Updated: 01/04/23 1615     Blood Culture No growth at 2 days    Blood Culture - Blood, Arm, Left [[id number]]  (Normal) Collected: 01/02/23 1539    Specimen: Blood from Arm, Left Updated: 01/04/23 1615     Blood Culture No growth at 2 days    Basic Metabolic Panel [[id number]]  (Abnormal) Collected: 01/04/23 0636    Specimen: Blood Updated: 01/04/23 0747     Glucose 162 mg/dL      BUN 62 mg/dL      Creatinine 8.17 mg/dL      Sodium 134 mmol/L      Potassium 4.1 mmol/L      Chloride 97 mmol/L      CO2 23.0 mmol/L      Calcium 9.1 mg/dL      BUN/Creatinine Ratio 7.6     Anion Gap 14.0 mmol/L      eGFR 6.6 mL/min/1.73      Comment: <15 Indicative of kidney failure       Narrative:      GFR Normal >60  Chronic Kidney Disease <60  Kidney Failure <15      CBC & Differential [[id number]]  (Abnormal) Collected: 01/04/23 0636    Specimen: Blood Updated: 01/04/23 0719    Narrative:      The following orders were created for panel order CBC & Differential.  Procedure                               Abnormality         Status                     ---------                               -----------         ------                     CBC Auto Differential[[id number]]        Abnormal            Final result                 Please view results for these tests on the individual orders.    CBC Auto Differential  [[id number]]  (Abnormal) Collected: 01/04/23 0636    Specimen: Blood Updated: 01/04/23 0719     WBC 8.27 10*3/mm3      RBC 3.11 10*6/mm3      Hemoglobin 8.8 g/dL      Hematocrit 26.9 %      MCV 86.5 fL      MCH 28.3 pg      MCHC 32.7 g/dL      RDW 16.1 %      RDW-SD 50.6 fl      MPV 9.6 fL      Platelets 194 10*3/mm3      Neutrophil % 55.5 %      Lymphocyte % 20.0 %      Monocyte % 19.5 %      Eosinophil % 3.6 %      Basophil % 0.6 %      Immature Grans % 0.8 %      Neutrophils, Absolute 4.59 10*3/mm3      Lymphocytes, Absolute 1.65 10*3/mm3      Monocytes, Absolute 1.61 10*3/mm3      Eosinophils, Absolute 0.30 10*3/mm3      Basophils, Absolute 0.05 10*3/mm3      Immature Grans, Absolute 0.07 10*3/mm3      nRBC 0.0 /100 WBC     POC Glucose Once [[id number]]  (Abnormal) Collected: 01/04/23 0606    Specimen: Blood Updated: 01/04/23 0627     Glucose 173 mg/dL      Comment: RN NotifiedOperator: [id number] NICHOLAS MARGUERITESEYMeter ID: [id number]       POC Glucose Once [[id number]]  (Abnormal) Collected: 01/03/23 1611    Specimen: Blood Updated: 01/04/23 0149     Glucose 251 mg/dL      Comment: RN NotifiedOperator: [id number] HUSSAIN BRAVOOasis Behavioral Health HospitalYMeter ID: [id number]       POC Glucose Once [[id number]]  (Abnormal) Collected: 01/03/23 1927    Specimen: Blood Updated: 01/03/23 2129     Glucose 199 mg/dL      Comment: RN NotifiedOperator: [id number] NICHOLAS CHELSEYMeter ID: [id number]       POC Glucose Once [[id number]]  (Abnormal) Collected: 01/03/23 1105    Specimen: Blood Updated: 01/03/23 1122     Glucose 235 mg/dL      Comment: : [id number] TEODORO ALEXISNorthwest Medical CenterMeter ID: [id number]       Basic Metabolic Panel [[id number]]  (Abnormal) Collected: 01/03/23 0818    Specimen: Blood Updated: 01/03/23 0847     Glucose 207 mg/dL      BUN 52 mg/dL      Creatinine 7.11 mg/dL      Sodium 136 mmol/L      Potassium 4.0 mmol/L      Chloride 95 mmol/L      CO2 27.0 mmol/L      Calcium 9.6 mg/dL      BUN/Creatinine Ratio 7.3     Anion Gap 14.0  mmol/L      eGFR 7.8 mL/min/1.73      Comment: <15 Indicative of kidney failure       Narrative:      GFR Normal >60  Chronic Kidney Disease <60  Kidney Failure <15      Lactic Acid, Plasma [[id number]]  (Normal) Collected: 01/03/23 0818    Specimen: Blood Updated: 01/03/23 0840     Lactate 0.7 mmol/L     CBC & Differential [[id number]]  (Abnormal) Collected: 01/03/23 0818    Specimen: Blood Updated: 01/03/23 0830    Narrative:      The following orders were created for panel order CBC & Differential.  Procedure                               Abnormality         Status                     ---------                               -----------         ------                     CBC Auto Differential[[id number]]        Abnormal            Final result                 Please view results for these tests on the individual orders.    CBC Auto Differential [[id number]]  (Abnormal) Collected: 01/03/23 0818    Specimen: Blood Updated: 01/03/23 0830     WBC 10.32 10*3/mm3      RBC 3.22 10*6/mm3      Hemoglobin 9.1 g/dL      Hematocrit 28.9 %      MCV 89.8 fL      MCH 28.3 pg      MCHC 31.5 g/dL      RDW 16.4 %      RDW-SD 53.6 fl      MPV 9.1 fL      Platelets 170 10*3/mm3      Neutrophil % 58.8 %      Lymphocyte % 18.7 %      Monocyte % 19.6 %      Eosinophil % 1.7 %      Basophil % 0.4 %      Immature Grans % 0.8 %      Neutrophils, Absolute 6.07 10*3/mm3      Lymphocytes, Absolute 1.93 10*3/mm3      Monocytes, Absolute 2.02 10*3/mm3      Eosinophils, Absolute 0.18 10*3/mm3      Basophils, Absolute 0.04 10*3/mm3      Immature Grans, Absolute 0.08 10*3/mm3      nRBC 0.0 /100 WBC     POC Glucose Once [[id number]]  (Abnormal) Collected: 01/03/23 0536    Specimen: Blood Updated: 01/03/23 0551     Glucose 222 mg/dL      Comment: RN NotifiedOperator: [id number] DANYELLE DUBOISchinedu ID: [id number]       POC Glucose Once [[id number]]  (Abnormal) Collected: 01/02/23 1628    Specimen: Blood Updated: 01/02/23 2211     Glucose 142 mg/dL       Comment: RN NotifiedOperator: [id number] IDANIA NICHOLASYMeter ID: [id number]       POC Glucose Once [[id number]]  (Abnormal) Collected: 01/02/23 2032    Specimen: Blood Updated: 01/02/23 2109     Glucose 284 mg/dL      Comment: RN NotifiedOperator: [id number] CANNON BETHANEYMeter ID: [id number]       Comprehensive Metabolic Panel [[id number]]  (Abnormal) Collected: 01/02/23 1538    Specimen: Blood Updated: 01/02/23 1637     Glucose 144 mg/dL      BUN 40 mg/dL      Creatinine 5.90 mg/dL      Sodium 138 mmol/L      Potassium 4.1 mmol/L      Chloride 95 mmol/L      CO2 27.0 mmol/L      Calcium 9.5 mg/dL      Total Protein 7.1 g/dL      Albumin 3.4 g/dL      ALT (SGPT) 16 U/L      AST (SGOT) 20 U/L      Alkaline Phosphatase 104 U/L      Total Bilirubin 0.3 mg/dL      Globulin 3.7 gm/dL      A/G Ratio 0.9 g/dL      BUN/Creatinine Ratio 6.8     Anion Gap 16.0 mmol/L      eGFR 9.8 mL/min/1.73      Comment: <15 Indicative of kidney failure       Narrative:      GFR Normal >60  Chronic Kidney Disease <60  Kidney Failure <15      CBC Auto Differential [[id number]]  (Abnormal) Collected: 01/02/23 1538    Specimen: Blood Updated: 01/02/23 1628     WBC 12.86 10*3/mm3      RBC 3.42 10*6/mm3      Hemoglobin 9.6 g/dL      Hematocrit 30.1 %      MCV 88.0 fL      MCH 28.1 pg      MCHC 31.9 g/dL      RDW 16.6 %      RDW-SD 52.4 fl      MPV 9.5 fL      Platelets 195 10*3/mm3      Neutrophil % 71.7 %      Lymphocyte % 9.7 %      Monocyte % 16.7 %      Eosinophil % 0.4 %      Basophil % 0.3 %      Immature Grans % 1.2 %      Neutrophils, Absolute 9.22 10*3/mm3      Lymphocytes, Absolute 1.25 10*3/mm3      Monocytes, Absolute 2.15 10*3/mm3      Eosinophils, Absolute 0.05 10*3/mm3      Basophils, Absolute 0.04 10*3/mm3      Immature Grans, Absolute 0.15 10*3/mm3      nRBC 0.0 /100 WBC         Imaging Results (Last 7 Days)     ** No results found for the last 168 hours. **            Chief Complaint on Day of Discharge: None.    Hospital  Course:  Patient was admitted for acute metabolic encephalopathy/sepsis (likely secondary to acute cystitis) and started on IV antibiotics and gentle IV hydration.  He did improve and his mental status came back to baseline.    Leukocytosis and lactic acidosis did resolve and blood culture showed no growth.   Antimicrobial therapy was de-escalated to Omnicef on discharge.  Patient has history of ESRD, was seen by nephrologist on consult and continued on inpatient hemodialysis.  Ambulatory unit was ordered on discharge secondary to deconditioning.          Condition on Discharge: Stable and improved.    Physical Exam on Discharge:  /74 (BP Location: Right arm, Patient Position: Lying)   Pulse 69   Temp 98 °F (36.7 °C) (Temporal)   Resp 18   Ht 180.3 cm (70.98\")   Wt 86 kg (189 lb 9.6 oz)   SpO2 96%   BMI 26.46 kg/m²   Physical Exam  Vitals and nursing note reviewed.   Constitutional:       General: He is not in acute distress.     Appearance: He is well-developed. He is ill-appearing. He is not diaphoretic.      Comments: He is chronically ill looking.   HENT:      Head: Normocephalic and atraumatic.   Eyes:      General: No scleral icterus.        Right eye: No discharge.         Left eye: No discharge.      Extraocular Movements: Extraocular movements intact.      Pupils: Pupils are equal, round, and reactive to light.   Neck:      Thyroid: No thyromegaly.      Vascular: No carotid bruit or JVD.   Cardiovascular:      Rate and Rhythm: Normal rate and regular rhythm.      Heart sounds: Normal heart sounds. No murmur heard.    No friction rub. No gallop.   Pulmonary:      Effort: Pulmonary effort is normal. No respiratory distress.      Breath sounds: Normal breath sounds. No stridor. No wheezing or rales.   Chest:      Chest wall: No tenderness.   Abdominal:      General: Bowel sounds are normal. There is no distension.      Palpations: Abdomen is soft. There is no mass.      Tenderness: There is no  abdominal tenderness. There is no right CVA tenderness, left CVA tenderness, guarding or rebound.      Hernia: No hernia is present.   Musculoskeletal:         General: No swelling, tenderness or deformity.      Cervical back: Normal range of motion and neck supple.      Right lower leg: No edema.      Left lower leg: No edema.   Skin:     General: Skin is warm and dry.      Coloration: Skin is not jaundiced or pale.      Findings: No bruising, erythema, lesion or rash.   Neurological:      General: No focal deficit present.      Mental Status: He is alert and oriented to person, place, and time.      Cranial Nerves: No cranial nerve deficit.      Sensory: No sensory deficit.      Motor: No weakness or abnormal muscle tone.      Coordination: Coordination normal.      Gait: Gait normal.      Deep Tendon Reflexes: Reflexes normal.   Psychiatric:         Mood and Affect: Mood normal.         Behavior: Behavior normal.         Thought Content: Thought content normal.         Judgment: Judgment normal.           Discharge Disposition:  Home-Health Care [facility] [place]    Discharge Medications:     Discharge Medications      New Medications      Instructions Start Date   cefdinir 300 MG capsule  Commonly known as: OMNICEF   300 mg, Oral, Every 24 Hours Scheduled         Continue These Medications      Instructions Start Date   acetaminophen 500 MG tablet  Commonly known as: TYLENOL   500 mg, Oral, Every 6 Hours PRN      aspirin 81 MG chewable tablet   81 mg, Oral, Daily      atorvastatin 40 MG tablet  Commonly known as: LIPITOR   40 mg, Oral, Daily      insulin aspart prot-insulin aspart (70-30) 100 UNIT/ML injection  Commonly known as: novoLOG 70/30   15 Units, Subcutaneous, 2 Times Daily With Meals      Melatonin 10 MG capsule   30 mg, Oral, Nightly      metoclopramide 5 MG tablet  Commonly known as: REGLAN   5 mg, Oral, 4 Times Daily      metoprolol succinate XL 50 MG 24 hr tablet  Commonly known as: TOPROL-XL   50 mg, Oral,  Daily      midodrine 10 MG tablet  Commonly known as: PROAMATINE   10 mg, Oral, 3 Times Weekly, 30 minutes before dialysis      SEVELAMER HCL PO   800 mg, Oral, Daily With Dinner      vitamin D 1.25 MG (42534 UT) capsule capsule  Commonly known as: ERGOCALCIFEROL   50,000 Units, Oral, Every 7 Days         Stop These Medications    Auryxia 1  MG(Fe) tablet  Generic drug: Ferric Citrate     multivitamin with minerals tablet tablet     PRESERVISION AREDS 2 PO     simvastatin 80 MG tablet  Commonly known as: ZOCOR            Discharge Diet: Cardiac and renal.    Activity at Discharge: As tolerated.     Discharge Care Plan/Instructions: Patient has been advised to take his medications as prescribed and to return to the emergency room in the event of any worsening symptoms.    Follow-up Appointments: Follow-up primary care provider as outpatient.    Test Results Pending at Discharge:   Pending Labs     Order Current Status    Blood Culture - Blood, Arm, Left Preliminary result    Blood Culture - Blood, Hand, Left Preliminary result          Antwon Solis MD  01/05/23  08:22 CST    Time: 35 minutes.

## 2023-01-07 LAB
BACTERIA SPEC AEROBE CULT: NORMAL
BACTERIA SPEC AEROBE CULT: NORMAL
GLUCOSE BLDC GLUCOMTR-MCNC: 187 MG/DL (ref 70–130)

## (undated) DEVICE — DECANTER FLD 9IN ST BG FOR ASEP TRNSF OF FLD

## (undated) DEVICE — ARM SLING: Brand: DEROYAL

## (undated) DEVICE — SYRINGE MED 10ML LUERLOCK TIP W/O SFTY DISP

## (undated) DEVICE — GLIDEPATH HEMODIALYSIS CATH, ST, DL, 14.5 FR. 23CM: Brand: GLIDEPATH LONG-TERM HEMODIALYSIS CATHETER WITH PRELOADED STYLET

## (undated) DEVICE — SUTURE ABSORBABLE MONOFILAMENT 3-0 SH 27 IN UD PDS + PDP416H

## (undated) DEVICE — SUTURE ETHLN SZ 3-0 L18IN NONABSORBABLE BLK FS-1 L24MM 3/8 663H

## (undated) DEVICE — 3M™ STERI-DRAPE™ INSTRUMENT POUCH 1018: Brand: STERI-DRAPE™

## (undated) DEVICE — NG KIT, 21 GA, 10 PACK: Brand: SITE-RITE

## (undated) DEVICE — SUTURE PROL SZ 6-0 L24IN NONABSORBABLE BLU L9.3MM BV-1 3/8 8805H

## (undated) DEVICE — GLIDEPATH HEMODIALYSIS CATH, ST, DL 14.5 FR. 19CM: Brand: GLIDEPATH LONG-TERM HEMODIALYSIS CATHETER WITH PRELOADED STYLET

## (undated) DEVICE — SUTURE PERMAHAND SZ 4-0 L12X30IN NONABSORBABLE BLK SILK A303H

## (undated) DEVICE — ELECTROSURGICAL PENCIL BUTTON SWITCH NON COATED BLADE ELECTRODE 10 FT (3 M) CORD HOLSTER: Brand: MEGADYNE

## (undated) DEVICE — SURGICAL PROCEDURE PACK VASC LOURDES HOSP

## (undated) DEVICE — SUTURE PROL SZ 3-0 L36IN NONABSORBABLE BLU L26MM SH 1/2 CIR 8522H

## (undated) DEVICE — ANGIOGRAPHY PK

## (undated) DEVICE — SOLUTION IV 250ML 0.9% SOD CHL PH 5 INJ USP VIAFLX PLAS

## (undated) DEVICE — MINOR CDS: Brand: MEDLINE INDUSTRIES, INC.

## (undated) DEVICE — LARYNGOSCOPE VID MILLER 2 MTL BLADE M HNDL CURAPLEX

## (undated) DEVICE — SOLUTION IV 500ML 0.9% SOD CHL PH 5 INJ USP VIAFLX PLAS

## (undated) DEVICE — Z INACTIVE USE 2535480 CLIP LIG M BLU TI HRT SHP WIRE HORZ 180 PER BX

## (undated) DEVICE — SUTURE ETHLN SZ 4-0 L18IN NONABSORBABLE BLK L19MM PS-2 3/8 1667H

## (undated) DEVICE — CATHETER KIT 5 FR 21 GAX7 CM MICROINTRODUCER GUIDEWIRE STIFF

## (undated) DEVICE — BLANKET WRM W29.9XL79.1IN UP BODY FORC AIR MISTRAL-AIR

## (undated) DEVICE — ADHESIVE SKIN CLSR 0.7ML TOP DERMBND ADV

## (undated) DEVICE — SHEET,T,THYROID,STERILE: Brand: MEDLINE

## (undated) DEVICE — PACK,UNIVERSAL,NO GOWNS: Brand: MEDLINE

## (undated) DEVICE — BLANKET WRM W40.2XL55.9IN IORT LO BODY + MISTRAL AIR

## (undated) DEVICE — GLOVE SURG SZ 75 L12IN FNGR THK94MIL TRNSLUC YEL LTX

## (undated) DEVICE — SUTURE VCRL SZ 3-0 L27IN ABSRB UD L26MM SH 1/2 CIR J416H

## (undated) DEVICE — SCANLAN® SURG-I-PAW® INSTRUMENT COVERS - RED, 1/10" X 5"/ 3 MMX13 CM (2 - 5" PCS /PKG): Brand: SCANLAN® SURG-I-PAW® INSTRUMENT COVERS

## (undated) DEVICE — SUTURE MCRYL SZ 4-0 L18IN ABSRB UD L19MM PS-2 3/8 CIR PRIM Y496G

## (undated) DEVICE — C-ARM: Brand: UNBRANDED

## (undated) DEVICE — COVER TRANSDUCER TELESCOPICALLY FOLDED 3.5X36 IN CIV-FLEX

## (undated) DEVICE — COVER US PRB W15XL120CM W/ GEL RUBBERBAND TAPE STRP FLD GEN

## (undated) DEVICE — SUTURE VCRL SZ 4-0 L18IN ABSRB UD VCRL POLYGLACTIN 910 COAT J109T

## (undated) DEVICE — AGENT HEMSTAT W4XL8IN OXIDIZED REGENERATED CELOS ABSRB

## (undated) DEVICE — CATHETER ANGIO 4FR L40CM 0.035IN KMP ANG SEL SFT RADPQ TIP

## (undated) DEVICE — SUTURE VCRL SZ 3-0 L18IN ABSRB UD W/O NDL POLYGLACTIN 910 J110T

## (undated) DEVICE — SUTURE PROL SZ 6-0 L18IN NONABSORBABLE BLU L13MM C-1 3/8 8718H

## (undated) DEVICE — SOLUTION IV IRRIG POUR BRL 0.9% SODIUM CHL 2F7124

## (undated) DEVICE — DRESSING FOAM W10XL10CM ABSRB ANTIMIC SAFETAC TECHNOLOGY

## (undated) DEVICE — SUTURE ETHLN SZ 2-0 L30IN NONABSORBABLE BLK L36MM FSLX 3/8 1674H

## (undated) DEVICE — TOWEL,OR,DSP,ST,BLUE,DLX,4/PK,20PK/CS: Brand: MEDLINE

## (undated) DEVICE — SUTURE PERMA-HAND SZ 2-0 L30IN NONABSORBABLE BLK L26MM SH K833H

## (undated) DEVICE — SUTURE PERMAHAND SZ 3-0 L30IN NONABSORBABLE BLK SILK BRAID A304H

## (undated) DEVICE — SYRINGE 20ML LL S/C 50

## (undated) DEVICE — GEL US 20GM NONIRRITATING OVERWRAPPED FILE PCH TRNSMIT

## (undated) DEVICE — SOLUTION INJ VISIPAQUE 50ML

## (undated) DEVICE — UNDERGLOVE SURG SZ 8 FNGR THK0.21MIL GRN LTX BEAD CUF

## (undated) DEVICE — PROVE COVER: Brand: UNBRANDED

## (undated) DEVICE — SOLUTION IV 1000ML 0.9% SOD CHL PH 5 INJ USP VIAFLX PLAS

## (undated) DEVICE — CLAMP INSERT: Brand: STEALTH® CLAMP INSERT

## (undated) DEVICE — GLOVE SURG SZ 7 L12IN FNGR THK79MIL GRN LTX FREE

## (undated) DEVICE — CLIP INT SM WIDE RED TI TRNSVRS GRV CHEVRON SHP W/ PRECIS

## (undated) DEVICE — COVER LT HNDL PLAS RIG 2 PER PK

## (undated) DEVICE — GLOVE SURG SZ 65 CRM LTX FREE POLYISOPRENE POLYMER BEAD ANTI